# Patient Record
Sex: MALE | Race: WHITE | Employment: OTHER | URBAN - METROPOLITAN AREA
[De-identification: names, ages, dates, MRNs, and addresses within clinical notes are randomized per-mention and may not be internally consistent; named-entity substitution may affect disease eponyms.]

---

## 2017-08-22 ENCOUNTER — HOSPITAL ENCOUNTER (EMERGENCY)
Facility: CLINIC | Age: 62
Discharge: HOME | End: 2017-08-22
Attending: EMERGENCY MEDICINE | Admitting: EMERGENCY MEDICINE
Payer: MEDICARE

## 2017-08-22 ENCOUNTER — APPOINTMENT (OUTPATIENT)
Dept: CT IMAGING | Facility: CLINIC | Age: 62
End: 2017-08-22
Attending: EMERGENCY MEDICINE
Payer: MEDICARE

## 2017-08-22 VITALS
DIASTOLIC BLOOD PRESSURE: 69 MMHG | RESPIRATION RATE: 24 BRPM | SYSTOLIC BLOOD PRESSURE: 125 MMHG | OXYGEN SATURATION: 100 % | HEART RATE: 76 BPM
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
## 2017-08-22 DIAGNOSIS — S01.81XA FACIAL LACERATION, INITIAL ENCOUNTER: ICD-10-CM

## 2017-08-22 DIAGNOSIS — S09.90XA CLOSED HEAD INJURY, INITIAL ENCOUNTER: ICD-10-CM

## 2017-08-22 PROCEDURE — 96372 THER/PROPH/DIAG INJ SC/IM: CPT

## 2017-08-22 PROCEDURE — 25000128 H RX IP 250 OP 636: Performed by: EMERGENCY MEDICINE

## 2017-08-22 PROCEDURE — 70450 CT HEAD/BRAIN W/O DYE: CPT

## 2017-08-22 PROCEDURE — 99285 EMERGENCY DEPT VISIT HI MDM: CPT | Mod: 25

## 2017-08-22 RX ORDER — LIDOCAINE HYDROCHLORIDE 10 MG/ML
INJECTION, SOLUTION EPIDURAL; INFILTRATION; INTRACAUDAL; PERINEURAL
Status: DISCONTINUED
Start: 2017-08-22 | End: 2017-08-22 | Stop reason: HOSPADM

## 2017-08-22 RX ORDER — HALOPERIDOL 5 MG/ML
5 INJECTION INTRAMUSCULAR ONCE
Status: DISCONTINUED | OUTPATIENT
Start: 2017-08-22 | End: 2017-08-22 | Stop reason: CLARIF

## 2017-08-22 RX ORDER — LORAZEPAM 2 MG/ML
2 INJECTION INTRAMUSCULAR ONCE
Status: COMPLETED | OUTPATIENT
Start: 2017-08-22 | End: 2017-08-22

## 2017-08-22 RX ORDER — ZIPRASIDONE MESYLATE 20 MG/ML
10 INJECTION, POWDER, LYOPHILIZED, FOR SOLUTION INTRAMUSCULAR ONCE
Status: COMPLETED | OUTPATIENT
Start: 2017-08-22 | End: 2017-08-22

## 2017-08-22 RX ORDER — LIDOCAINE HYDROCHLORIDE 10 MG/ML
INJECTION, SOLUTION INFILTRATION; PERINEURAL
Status: DISCONTINUED
Start: 2017-08-22 | End: 2017-08-22 | Stop reason: HOSPADM

## 2017-08-22 RX ORDER — HALOPERIDOL 5 MG/ML
5 INJECTION INTRAMUSCULAR ONCE
Status: COMPLETED | OUTPATIENT
Start: 2017-08-22 | End: 2017-08-22

## 2017-08-22 RX ADMIN — LORAZEPAM 2 MG: 2 INJECTION INTRAMUSCULAR; INTRAVENOUS at 16:27

## 2017-08-22 RX ADMIN — ZIPRASIDONE MESYLATE 10 MG: 20 INJECTION, POWDER, LYOPHILIZED, FOR SOLUTION INTRAMUSCULAR at 17:16

## 2017-08-22 RX ADMIN — HALOPERIDOL LACTATE 5 MG: 5 INJECTION, SOLUTION INTRAMUSCULAR at 16:27

## 2017-08-22 NOTE — ED AVS SNAPSHOT
[facility] Emergency Department    [address] [place] [address]    Phone:  [phone]    Fax:  [phone]                                       Gigi Lackey   MRN: [id number]    Department:  [facility] Emergency Department   Date of Visit:  8/22/2017           Patient Information     Date Of Birth          1955        Your diagnoses for this visit were:     Fall, initial encounter     Closed head injury, initial encounter     Facial laceration, initial encounter        You were seen by Aydin Gonzalez MD.      Follow-up Information     Follow up with Janeth Paz MD. Schedule an appointment as soon as possible for a visit in 2 days.    Specialty:  Family Practice    Contact information:    [facility]  [address]   [place] [id number] [phone]          Discharge Instructions       Discharge Instructions  Trauma    You were seen today for an injury due to some kind of trauma (crash, fall, etc.).  Some injuries may not show up until after you leave the Emergency Department.  It is important that you pay attention to these instructions and follow-up with your regular doctor as instructed.    Return to the Emergency Department right away if:    You have abdominal pain or bruises, chest pain, pain in a new area, or pain that is getting worse.    You get short of breath.    You develop a fever over 101 degrees.    You have weakness in your arms or legs.    You faint or you are very lightheaded.    You have any new symptoms, you are feeling weak or unusually ill, or something worries you.     Injuries to the brain are possible with any accident.  Return right away if you have confusion, vomiting more than once, difficulty walking or a headache that is getting worse. Bring a child or a person who can t talk back if they seem to be behaving in an abnormal way.      MORE INFORMATION:    General Injuries:    Aches and pains  are usually worse the day after your accident, but should not be severe, and should start getting better after that. Aches and pains are common in the neck and back.    Injuries from your accident may prevent you from working.  Follow-up with your regular doctor to get a work note and to find out how long you will not be able to work.    Pain medications or your injuries may make it unsafe for you to drive or operate machinery.    Use ice to injured areas for the first one or two days. Apply a bag of ice wrapped in a cloth for about 15 minutes at a time. You can do this as often as once an hour. Do not sleep with an ice pack, since it can burn you.     You can use non-prescription pain medicine, like Tylenol  (acetaminophen), Advil  (ibuprofen), Motrin  (ibuprofen), Nuprin  (ibuprofen) if your emergency doctor or your own doctor told you this is okay. Tylenol  (acetaminophen) is in many prescription medicines and non-prescription medicines--check all of your medicines to be sure you aren t taking more than 3000 mg per day.    Limit your activity for at least one or two days. Avoid doing things that hurt.    You need to see your doctor if any injured area is not back to normal in 1 week.    Car Accident:    If you have been on a backboard or had a neck collar on, this may make you stiff and sore.  This should get better in 1-2 days.  Return to the Emergency Department if the pain or discomfort is severe or gets worse.    Be careful of shards of glass on your body or in your belongings.    Fractures, Sprains, and Strains:    Return to the Emergency Department right away if your injured area gets more painful, if the splint or dressing seems to be too tight, if it gets numb or tingly past the injury, or if the area past the injury gets pale, blue, or cold.    Use your crutches if you were given them today. Don t put weight on the injured area until the pain is gone.    Keep the injured area above the level of your  heart while laying or sitting down.  This well help lessen the swelling (puffiness) and the pain.    You may use an elastic bandage (Ace  Wrap) if it makes you more comfortable. Wrap it just tight enough to provide mild compression, and loosen it if you get swelling past the bandage.    Note about X-rays: If you had x-rays done today, they were read by your emergency physician. They will also be read later by a radiologist. We will contact you if the radiologist thinks they show something different than the emergency physician did. Remember that there are some fractures (breaks in the bone) that can t be seen right away. Even if your x-rays today were normal, you must see your doctor in clinic to re-check.     Splints:    A splint put on in the Emergency Department is temporary. Your regular doctor or orthopedic doctor will remove it, and replace it with a cast or boot if needed.    Keep the splint dry. Cover it with a plastic bag when you wash. Even with a plastic bag, you still can t get in water or let water get right on it. If it does get wet, you should come back or see your doctor to have it replaced.    Do not put objects inside the splint to scratch.    If there is an elastic bandage (Ace  Wrap) holding the splint on this may be loosened a little to relieve pressure or pain.  If pain continues return to the Emergency Department right away.    Return if the splint starts cutting into your skin.    Do not remove your splint by yourself unless told to by your doctor. You can t take it off and put it back on again.     Wounds:    Infections can follow many injuries. Watch for fevers, redness spreading from the wound, pus or stitches that open up. Return here or see your doctor if these happen.    There can always be glass, wood, dirt or other things in any wound.  They won t always show up even on x-rays.  If a wound doesn t heal, this may be why, and it is important to follow-up with your regular doctor. Small  pieces of glass or other materials may work their way out on their own.    Cuts or scrapes may start to bleed after leaving the Emergency Department.  If this happens, hold pressure on the bleeding area with a clean cloth or put pressure over the bandage.  If the bleeding doesn t stop after you use constant pressure for   hour, you should return to the Emergency Department for further treatment.    Any bandage or dressing put on here should be removed in 12-24 hours, or as your doctor instructs. Remove the dressing sooner if it seems too tight or painful, or if it is getting numb, tingly, or pale past the dressing.    After you take off the dressing, wash the cut or scrape with soap and water once or twice a day.    Apply ointment like Bacitracin  (polypeptide antibiotic) to scrapes or cuts, and keep them covered with a Band-Aid  or gauze if possible, until they heal up or until your stitches are taken out.    Dermabond  or Steri-Strips  should be left alone and will come off by themselves.  Dissolving stitches should go away or fall out within about a week.    Regular stitches need to be taken out by your doctor in clinic.  Call today and schedule an appointment.  Leave your stitches in for as long as you were told today.    Most injuries are preventable!  As your local emergency physicians, we encourage you to:    Wear your seat belt.    Do not talk on your cell phone while driving.    Do not read or send text messages while driving.    Wear a bike or motorcycle helmet.    Wear a helmet while skiing and snowboarding.    Wear personal flotation devices at all times while on the water.    Always have your child in a car seat.    Do not allow children less than 12 years old to ride in the front seat.    Go to the CDC website to find more information on preventing injures:  http://www.cdc.gov/injury/index.html    If you were given a prescription for medicine here today, be sure to read all of the information  (including the package insert) that comes with your prescription.  This will include important information about the medicine, its side effects, and any warnings that you need to know about.  The pharmacist who fills the prescription can provide more information and answer questions you may have about the medicine.  If you have questions or concerns that the pharmacist cannot address, please call or return to the Emergency Department.     Opioid Medication Information    Pain medications are among the most commonly prescribed medicines, so we are including this information for all our patients. If you did not receive pain medication or get a prescription for pain medicine, you can ignore it.     You may have been given a prescription for an opioid (narcotic) pain medicine and/or have received a pain medicine while here in the Emergency Department. These medicines can make you drowsy or impaired. You must not drive, operate dangerous equipment, or engage in any other dangerous activities while taking these medications. If you drive while taking these medications, you could be arrested for DUI, or driving under the influence. Do not drink any alcohol while you are taking these medications.     Opioid pain medications can cause addiction. If you have a history of chemical dependency of any type, you are at a higher risk of becoming addicted to pain medications.  Only take these prescribed medications to treat your pain when all other options have been tried. Take it for as short a time and as few doses as possible. Store your pain pills in a secure place, as they are frequently stolen and provide a dangerous opportunity for children or visitors in your house to start abusing these powerful medications. We will not replace any lost or stolen medicine.  As soon as your pain is better, you should flush all your remaining medication.     Many prescription pain medications contain Tylenol  (acetaminophen), including  Vicodin , Tylenol #3 , Norco , Lortab , and Percocet .  You should not take any extra pills of Tylenol  if you are using these prescription medications or you can get very sick.  Do not ever take more than 3000 mg of acetaminophen in any 24 hour period.    All opioids tend to cause constipation. Drink plenty of water and eat foods that have a lot of fiber, such as fruits, vegetables, prune juice, apple juice and high fiber cereal.  Take a laxative if you don t move your bowels at least every other day. Miralax , Milk of Magnesia, Colace , or Senna  can be used to keep you regular.      Remember that you can always come back to the Emergency Department if you are not able to see your regular doctor in the amount of time listed above, if you get any new symptoms, or if there is anything that worries you.      Sutures are absorbable and do not need to be taken out.    24 Hour Appointment Hotline       To make an appointment at any [facility], call [phone] ([phone]). If you don't have a family doctor or clinic, we will help you find one. [facility] clinics are conveniently located to serve the needs of you and your family.             Review of your medicines      Our records show that you are taking the medicines listed below. If these are incorrect, please call your family doctor or clinic.        Dose / Directions Last dose taken    acetaminophen 325 MG tablet   Commonly known as:  TYLENOL   Dose:  650 mg   Quantity:  40 tablet        Take 2 tablets (650 mg) by mouth every 4 hours as needed   Refills:  0        calcium carbonate 500 MG chewable tablet   Commonly known as:  TUMS        1 tablet in am and 2 tablets at 1600   Refills:  0        carbamide peroxide 6.5 % otic solution   Commonly known as:  DEBROX   Dose:  5 drop        5 drops 2 times daily   Refills:  0        DEPAKOTE PO        250mg in am and 500mg hs   Refills:  0        DIAZEPAM PO   Dose:  7.5 mg        Take 7.5 mg by mouth 1.5  tablets PO 3.5hrs before medical appointments   Refills:  0        DOCUSATE SODIUM PO   Dose:  200 mg        Take 200 mg by mouth daily   Refills:  0        FOLIC ACID PO   Dose:  1 mg        Take 1 mg by mouth daily   Refills:  0        ibuprofen 200 MG tablet   Commonly known as:  ADVIL/MOTRIN   Dose:  600 mg   Quantity:  40 tablet        Take 3 tablets (600 mg) by mouth every 6 hours as needed   Refills:  0        ketoconazole 2 % Foam        Externally apply topically twice a week   Refills:  0        LEXAPRO PO   Dose:  15 mg        Take 15 mg by mouth daily   Refills:  0        loratadine 10 MG tablet   Commonly known as:  CLARITIN   Dose:  10 mg        Take 10 mg by mouth daily as needed for allergies   Refills:  0        multivitamin, therapeutic Tabs tablet   Dose:  1 tablet        Take 1 tablet by mouth daily   Refills:  0        OMEPRAZOLE PO   Dose:  20 mg        Take 20 mg by mouth daily   Refills:  0        psyllium 0.52 G capsule   Dose:  1 capsule        Take 1 capsule by mouth daily   Refills:  0        sennosides 8.6 MG tablet   Commonly known as:  SENOKOT   Dose:  1 tablet        Take 1 tablet by mouth daily   Refills:  0        ZYPREXA PO   Dose:  5 mg        Take 5 mg by mouth At Bedtime   Refills:  0                Procedures and tests performed during your visit     CT Head w/o Contrast      Orders Needing Specimen Collection     None      Pending Results     No orders found from 8/20/2017 to 8/23/2017.            Pending Culture Results     No orders found from 8/20/2017 to 8/23/2017.            Pending Results Instructions     If you had any lab results that were not finalized at the time of your Discharge, you can call the ED Lab Result RN at [phone]. You will be contacted by this team for any positive Lab results or changes in treatment. The nurses are available 7 days a week from 10A to 6:30P.  You can leave a message 24 hours per day and they will return your call.        Test  Results From Your Hospital Stay        8/22/2017  6:11 PM      Narrative     CT SCAN OF THE HEAD WITHOUT CONTRAST   8/22/2017 6:03 PM     HISTORY: Trauma    TECHNIQUE:  Axial images of the head and coronal reformations without  IV contrast material.  Radiation dose for this scan was reduced using  automated exposure control, adjustment of the mA and/or kV according  to patient size, or iterative reconstruction technique.    COMPARISON: 6/5/2016    FINDINGS: There is a supraorbital scalp hematoma. There is no evidence  for any underlying intracranial hemorrhage or skull fracture. Mild  cerebral atrophy is present. There is no evidence for acute infarct or  mass effect.        Impression     IMPRESSION:  1. Right supraorbital scalp hematoma. No evidence for intracranial  hemorrhage or fracture.  2. Mild cerebral atrophy.    PAULO JOHN MD                Clinical Quality Measure: Blood Pressure Screening     Your blood pressure was checked while you were in the emergency department today. The last reading we obtained was  BP: 125/69 . Please read the guidelines below about what these numbers mean and what you should do about them.  If your systolic blood pressure (the top number) is less than 120 and your diastolic blood pressure (the bottom number) is less than 80, then your blood pressure is normal. There is nothing more that you need to do about it.  If your systolic blood pressure (the top number) is 120-139 or your diastolic blood pressure (the bottom number) is 80-89, your blood pressure may be higher than it should be. You should have your blood pressure rechecked within a year by a primary care provider.  If your systolic blood pressure (the top number) is 140 or greater or your diastolic blood pressure (the bottom number) is 90 or greater, you may have high blood pressure. High blood pressure is treatable, but if left untreated over time it can put you at risk for heart attack, stroke, or kidney failure. You  should have your blood pressure rechecked by a primary care provider within the next 4 weeks.  If your provider in the emergency department today gave you specific instructions to follow-up with your doctor or provider even sooner than that, you should follow that instruction and not wait for up to 4 weeks for your follow-up visit.        Thank you for choosing [facility]       Thank you for choosing [facility] for your care. Our goal is always to provide you with excellent care. Hearing back from our patients is one way we can continue to improve our services. Please take a few minutes to complete the written survey that you may receive in the mail after you visit with us. Thank you!        [employer] Information     [facility] gives you secure access to your electronic health record. If you see a primary care provider, you can also send messages to your care team and make appointments. If you have questions, please call your primary care clinic.  If you do not have a primary care provider, please call [phone] and they will assist you.        Care EveryWhere ID     This is your Care EveryWhere ID. This could be used by other organizations to access your [place] medical records  [id number]        Equal Access to Services     [facility] : Hadmelissa Rivas, leroy ray, qaalexis mcpherson. So [facility] [phone].    ATENCIÓN: Si habla español, tiene a zhang disposición servicios gratuitos de asistencia lingüística. Llame al [phone].    We comply with applicable federal civil rights laws and [place] laws. We do not discriminate on the basis of race, color, national origin, age, disability sex, sexual orientation or gender identity.            After Visit Summary       This is your record. Keep this with you and show to your community pharmacist(s) and doctor(s) at your next visit.

## 2017-08-22 NOTE — ED NOTES
Pt got up from wheelchair and fell forward, no LOC per staff.  Pt nonverbal at baseline, multiple facial abrasions and swelling, left hand injury.  PMH: See hx and paper list  Meds; See paper list

## 2017-08-22 NOTE — PROGRESS NOTES
Sats 84% on RA upon arrival back to room. O2 2 LPM in place. Sats returned to 100%. Pt sleepy. Rouses when touched.

## 2017-08-22 NOTE — ED AVS SNAPSHOT
[facility] Emergency Department    [address]    [facility] [address]    Phone:  [phone]    Fax:  [phone]                                       Gigi Lackey   MRN: [id number]    Department:  [facility] Emergency Department   Date of Visit:  8/22/2017           After Visit Summary Signature Page     I have received my discharge instructions, and my questions have been answered. I have discussed any challenges I see with this plan with the nurse or doctor.    ..........................................................................................................................................  Patient/Patient Representative Signature      ..........................................................................................................................................  Patient Representative Print Name and Relationship to Patient    ..................................................               ................................................  Date                                            Time    ..........................................................................................................................................  Reviewed by Signature/Title    ...................................................              ..............................................  Date                                                            Time

## 2017-08-22 NOTE — ED PROVIDER NOTES
History     Chief Complaint:  Fall      HPI The history is obtained through the patient's caregivers and is limited due to the patient's non-verbal status.     Gigi Lackey is a 61 year old male with a pervasive developmental disorder and profound intellectual disability who is wheelchair bound at baseline who presents accompanied by his caregivers for evaluation following a fall. Today around 1450, the patient had an unwitnessed fall forward from his wheelchair onto the ground in which he struck his forehead and sustained multiple abrasions to his face and a laceration to his right eyebrow. The patient's caregivers found him immediately after the fall and he was conscious at that time. They then brought him into the ED for evaluation of this head injury. His caregivers note that he did appear to be falling asleep in the car on the way to the ED, which is abnormal for him. Currently in the ED, the patient is repositioning himself in his chair and making noises, which they report that he does at baseline, and they feel that he is acting normally.     Allergies:  Actifed   Alkylamines   Doxycycline   Sympathomimetics      Medications:    acetaminophen (TYLENOL) 325 MG tablet  ibuprofen (ADVIL,MOTRIN) 200 MG tablet  loratadine (CLARITIN) 10 MG tablet  ketoconazole 2 % FOAM  carbamide peroxide (DEBROX) 6.5 % otic solution  calcium carbonate (TUMS) 500 MG chewable tablet  Escitalopram Oxalate (LEXAPRO PO)  Divalproex Sodium (DEPAKOTE PO)  DOCUSATE SODIUM PO  FOLIC ACID PO  multivitamin, therapeutic (THERA-VIT) TABS  OMEPRAZOLE PO  sennosides (SENOKOT) 8.6 MG tablet  psyllium 0.52 G capsule  OLANZapine (ZYPREXA PO)  DIAZEPAM PO    Past Medical History:    Anemia  Epilepsy  GERD   Osteoporosis   Pervasive developmental disorder  Profound intellectual disability     Past Surgical History:    Ankle surgery     Family History:    History reviewed. No pertinent family history.     Social History:  Tobacco use:    Never  smoker  Alcohol use:    Negative  Marital status:    Single   Accompanied to ED by:  Caregivers      Review of Systems   Unable to perform ROS: Patient nonverbal     Physical Exam   First Vitals:  BP: (!) 87/72 (Pt not cooperative with BP at baseline per staff pt moving a lot during reading.)  Pulse: 76  Temp:  (Pt agitated at this time will try in the room in the back)  Resp: 24  SpO2: 100 %      Repeat Vitals:   BP: 125/69  SpO2: 97 % (08/22 1551-08/22 1924)        Physical Exam  General: Sitting in a wheelchair moaning and resists attempts to examine him.     HENT: Mucous membranes moist. Abrasions to his right ear, right zygomatic arch, and right brow with laceration and swelling.     Cardiovascular: Regular rate and rhythm. Good pulses in all four extremities. Normal capillary refill and skin turgor.     Respiratory: Lungs are clear. No nasal flaring. No retractions. No wheezing, no crackles.    Gastrointestinal: Abdomen soft. No guarding, no rebound. No palpable hernias.     Musculoskeletal: No gross deformity. There is no grimace with palpation of any bones.     Skin: No rashes or petechiae.     Neurologic: Holds wrists in flexion. Pulls away from the exam but does not communicate.      Lymphatic: No cervical adenopathy. No lower extremity swelling.    Emergency Department Course     Imaging:  Radiographic findings were communicated with the caregivers who voiced understanding of the findings.    CT Head w/o Contrast:  IMPRESSION:  1. Right supraorbital scalp hematoma. No evidence for intracranial hemorrhage or fracture.  2. Mild cerebral atrophy.  Per radiology.     Procedures:    Narrative: Procedure: Laceration Repair        LACERATION:  A simple clean 3 cm laceration.      LOCATION:  Right eyebrow       FUNCTION:  Distally sensation and circulation are intact.      ANESTHESIA:  Local using 1.0% Lidocaine and LET - Topical      PREPARATION:  Irrigation with Normal Saline and Shur Clens      DEBRIDEMENT:   no debridement      CLOSURE:  Wound was closed with One Layer.  Skin closed with 5 x 5.0 Vicryl Rapide using interrupted sutures.     Interventions:  1627 Ativan 2 mg IM  1627 Haldol 5 mg IM  1716 Geodon 10 mg IM     Emergency Department Course:  Nursing notes and vitals reviewed.  1559: I performed an exam of the patient as documented above.     1825:  A laceration repair was performed as outlined in the procedure note above.  The patient tolerated well and there were no complications.     2003: I reassessed the patient. He is moving spontaneously.     Findings and plan explained to the caregiver. Patient discharged home with instructions regarding supportive care, medications, and reasons to return. The importance of close follow-up was reviewed.     Impression & Plan      Medical Decision Making:  Gigi Lackey is a 61 year old male who unfortunately is not able to talk or communicate and the staff was with him and is very helpful and states that he is at his baseline. The only concerning feature is that after he fell out of his chair he did not black out but was somewhat more sleepy in the car. I therefore did have to sedate the patient to perform a CT scan of his head, which was normal except for the hematoma and the findings stated above. The patient also had facial lacerations that were sutured. He showed no signs to suggest bony tenderness anywhere else, and he moved his extremities without significant problems and he was discharged in good condition back with his caregivers to his facility where they will continue observing him and have him follow up with his primary care doctor. He was discharged with head injury instruction sheet provided.     Diagnosis:    ICD-10-CM   1. Fall, initial encounter W19.XXXA   2. Closed head injury, initial encounter S09.90XA   3. Facial laceration, initial encounter S01.81XA       Disposition:  Discharged to home.       Jean-Paul SOLOMON, am serving as a scribe at 3:59 PM on  8/22/2017 to document services personally performed by Dr. Gonzalez, based on my observations and the provider's statements to me.    [facility] EMERGENCY DEPARTMENT       Aydin Gonzalez MD  08/22/17 8295

## 2017-08-23 NOTE — ED NOTES
Staff verbalizes the understanding of dc instructions, signs to return to ER, and follow up directions.

## 2017-08-23 NOTE — DISCHARGE INSTRUCTIONS
Discharge Instructions  Trauma    You were seen today for an injury due to some kind of trauma (crash, fall, etc.).  Some injuries may not show up until after you leave the Emergency Department.  It is important that you pay attention to these instructions and follow-up with your regular doctor as instructed.    Return to the Emergency Department right away if:    You have abdominal pain or bruises, chest pain, pain in a new area, or pain that is getting worse.    You get short of breath.    You develop a fever over 101 degrees.    You have weakness in your arms or legs.    You faint or you are very lightheaded.    You have any new symptoms, you are feeling weak or unusually ill, or something worries you.     Injuries to the brain are possible with any accident.  Return right away if you have confusion, vomiting more than once, difficulty walking or a headache that is getting worse. Bring a child or a person who can t talk back if they seem to be behaving in an abnormal way.      MORE INFORMATION:    General Injuries:    Aches and pains are usually worse the day after your accident, but should not be severe, and should start getting better after that. Aches and pains are common in the neck and back.    Injuries from your accident may prevent you from working.  Follow-up with your regular doctor to get a work note and to find out how long you will not be able to work.    Pain medications or your injuries may make it unsafe for you to drive or operate machinery.    Use ice to injured areas for the first one or two days. Apply a bag of ice wrapped in a cloth for about 15 minutes at a time. You can do this as often as once an hour. Do not sleep with an ice pack, since it can burn you.     You can use non-prescription pain medicine, like Tylenol  (acetaminophen), Advil  (ibuprofen), Motrin  (ibuprofen), Nuprin  (ibuprofen) if your emergency doctor or your own doctor told you this is okay. Tylenol  (acetaminophen) is in  many prescription medicines and non-prescription medicines--check all of your medicines to be sure you aren t taking more than 3000 mg per day.    Limit your activity for at least one or two days. Avoid doing things that hurt.    You need to see your doctor if any injured area is not back to normal in 1 week.    Car Accident:    If you have been on a backboard or had a neck collar on, this may make you stiff and sore.  This should get better in 1-2 days.  Return to the Emergency Department if the pain or discomfort is severe or gets worse.    Be careful of shards of glass on your body or in your belongings.    Fractures, Sprains, and Strains:    Return to the Emergency Department right away if your injured area gets more painful, if the splint or dressing seems to be too tight, if it gets numb or tingly past the injury, or if the area past the injury gets pale, blue, or cold.    Use your crutches if you were given them today. Don t put weight on the injured area until the pain is gone.    Keep the injured area above the level of your heart while laying or sitting down.  This well help lessen the swelling (puffiness) and the pain.    You may use an elastic bandage (Ace  Wrap) if it makes you more comfortable. Wrap it just tight enough to provide mild compression, and loosen it if you get swelling past the bandage.    Note about X-rays: If you had x-rays done today, they were read by your emergency physician. They will also be read later by a radiologist. We will contact you if the radiologist thinks they show something different than the emergency physician did. Remember that there are some fractures (breaks in the bone) that can t be seen right away. Even if your x-rays today were normal, you must see your doctor in clinic to re-check.     Splints:    A splint put on in the Emergency Department is temporary. Your regular doctor or orthopedic doctor will remove it, and replace it with a cast or boot if  needed.    Keep the splint dry. Cover it with a plastic bag when you wash. Even with a plastic bag, you still can t get in water or let water get right on it. If it does get wet, you should come back or see your doctor to have it replaced.    Do not put objects inside the splint to scratch.    If there is an elastic bandage (Ace  Wrap) holding the splint on this may be loosened a little to relieve pressure or pain.  If pain continues return to the Emergency Department right away.    Return if the splint starts cutting into your skin.    Do not remove your splint by yourself unless told to by your doctor. You can t take it off and put it back on again.     Wounds:    Infections can follow many injuries. Watch for fevers, redness spreading from the wound, pus or stitches that open up. Return here or see your doctor if these happen.    There can always be glass, wood, dirt or other things in any wound.  They won t always show up even on x-rays.  If a wound doesn t heal, this may be why, and it is important to follow-up with your regular doctor. Small pieces of glass or other materials may work their way out on their own.    Cuts or scrapes may start to bleed after leaving the Emergency Department.  If this happens, hold pressure on the bleeding area with a clean cloth or put pressure over the bandage.  If the bleeding doesn t stop after you use constant pressure for   hour, you should return to the Emergency Department for further treatment.    Any bandage or dressing put on here should be removed in 12-24 hours, or as your doctor instructs. Remove the dressing sooner if it seems too tight or painful, or if it is getting numb, tingly, or pale past the dressing.    After you take off the dressing, wash the cut or scrape with soap and water once or twice a day.    Apply ointment like Bacitracin  (polypeptide antibiotic) to scrapes or cuts, and keep them covered with a Band-Aid  or gauze if possible, until they heal up or  until your stitches are taken out.    Dermabond  or Steri-Strips  should be left alone and will come off by themselves.  Dissolving stitches should go away or fall out within about a week.    Regular stitches need to be taken out by your doctor in clinic.  Call today and schedule an appointment.  Leave your stitches in for as long as you were told today.    Most injuries are preventable!  As your local emergency physicians, we encourage you to:    Wear your seat belt.    Do not talk on your cell phone while driving.    Do not read or send text messages while driving.    Wear a bike or motorcycle helmet.    Wear a helmet while skiing and snowboarding.    Wear personal flotation devices at all times while on the water.    Always have your child in a car seat.    Do not allow children less than 12 years old to ride in the front seat.    Go to the CDC website to find more information on preventing injures:  http://www.cdc.gov/injury/index.html    If you were given a prescription for medicine here today, be sure to read all of the information (including the package insert) that comes with your prescription.  This will include important information about the medicine, its side effects, and any warnings that you need to know about.  The pharmacist who fills the prescription can provide more information and answer questions you may have about the medicine.  If you have questions or concerns that the pharmacist cannot address, please call or return to the Emergency Department.     Opioid Medication Information    Pain medications are among the most commonly prescribed medicines, so we are including this information for all our patients. If you did not receive pain medication or get a prescription for pain medicine, you can ignore it.     You may have been given a prescription for an opioid (narcotic) pain medicine and/or have received a pain medicine while here in the Emergency Department. These medicines can make you drowsy  or impaired. You must not drive, operate dangerous equipment, or engage in any other dangerous activities while taking these medications. If you drive while taking these medications, you could be arrested for DUI, or driving under the influence. Do not drink any alcohol while you are taking these medications.     Opioid pain medications can cause addiction. If you have a history of chemical dependency of any type, you are at a higher risk of becoming addicted to pain medications.  Only take these prescribed medications to treat your pain when all other options have been tried. Take it for as short a time and as few doses as possible. Store your pain pills in a secure place, as they are frequently stolen and provide a dangerous opportunity for children or visitors in your house to start abusing these powerful medications. We will not replace any lost or stolen medicine.  As soon as your pain is better, you should flush all your remaining medication.     Many prescription pain medications contain Tylenol  (acetaminophen), including Vicodin , Tylenol #3 , Norco , Lortab , and Percocet .  You should not take any extra pills of Tylenol  if you are using these prescription medications or you can get very sick.  Do not ever take more than 3000 mg of acetaminophen in any 24 hour period.    All opioids tend to cause constipation. Drink plenty of water and eat foods that have a lot of fiber, such as fruits, vegetables, prune juice, apple juice and high fiber cereal.  Take a laxative if you don t move your bowels at least every other day. Miralax , Milk of Magnesia, Colace , or Senna  can be used to keep you regular.      Remember that you can always come back to the Emergency Department if you are not able to see your regular doctor in the amount of time listed above, if you get any new symptoms, or if there is anything that worries you.      Sutures are absorbable and do not need to be taken out.

## 2018-05-04 ENCOUNTER — APPOINTMENT (OUTPATIENT)
Dept: GENERAL RADIOLOGY | Facility: CLINIC | Age: 63
End: 2018-05-04
Attending: EMERGENCY MEDICINE
Payer: MEDICARE

## 2018-05-04 ENCOUNTER — HOSPITAL ENCOUNTER (EMERGENCY)
Facility: CLINIC | Age: 63
Discharge: HOME | End: 2018-05-04
Attending: EMERGENCY MEDICINE | Admitting: EMERGENCY MEDICINE
Payer: MEDICARE

## 2018-05-04 VITALS — TEMPERATURE: 98.2 F | RESPIRATION RATE: 20 BRPM | OXYGEN SATURATION: 98 %

## 2018-05-04 DIAGNOSIS — S63.614A SPRAIN OF RIGHT RING FINGER, UNSPECIFIED SITE OF FINGER, INITIAL ENCOUNTER: ICD-10-CM

## 2018-05-04 DIAGNOSIS — S60.512A ABRASION OF LEFT HAND, INITIAL ENCOUNTER: ICD-10-CM

## 2018-05-04 DIAGNOSIS — S62.002A CLOSED DISPLACED FRACTURE OF SCAPHOID OF LEFT WRIST, UNSPECIFIED PORTION OF SCAPHOID, INITIAL ENCOUNTER: ICD-10-CM

## 2018-05-04 PROCEDURE — 73130 X-RAY EXAM OF HAND: CPT | Mod: 50

## 2018-05-04 PROCEDURE — 99284 EMERGENCY DEPT VISIT MOD MDM: CPT | Mod: 25

## 2018-05-04 PROCEDURE — 73610 X-RAY EXAM OF ANKLE: CPT | Mod: RT

## 2018-05-04 PROCEDURE — 25622 CLTX CARPL SCPHD FX W/O MNPJ: CPT | Mod: LT

## 2018-05-04 ASSESSMENT — ENCOUNTER SYMPTOMS
BACK PAIN: 0
ARTHRALGIAS: 1
NECK PAIN: 0
HEADACHES: 0

## 2018-05-04 NOTE — ED PROVIDER NOTES
History     Chief Complaint:  Fall    HPI   Gigi Lackey is a 62 year old male with a history of profound intellectual disability who presents with following a fall. The patient presents today with his group home staff member this morning after experiencing a fall yesterday. This fall occurred from two steps off of the ground onto a cement surface. The patient fell onto his hands and body first and secondarily hit his head. The patient seems to have some pain in his bilateral hands and right ankle. He does have a history of spontaneous right ankle swelling. The patient did not lose consciousness, has not vomited, and does appear to be mentating at his normal baseline.    Allergies:  Actifed  Antihistamines, chlorpheniramine type  Doxycycline  Sympathomimetics     Medications:    Diazepam  Depakote  Lexapro  Claritin  Zyprexa  Omeprazole    Past Medical History:    Anemia  Epilepsy  GERD  Osteoporosis  Pervasive developmental disorder  Profound intellectual disability     Past Surgical History:    Orthopedic ankle surgery    Family History:    The patient denies any relevant family medical history.     Social History:  The patient was accompanied to the ED by a caregiver.  Smoking Status: No  Alcohol Use: No   Marital Status:  Single [1]    Review of Systems   Musculoskeletal: Positive for arthralgias. Negative for back pain and neck pain.   Neurological: Negative for syncope and headaches.   All other systems reviewed and are negative.    Vital signs and nursing notes reviewed.     Physical Exam   Vitals:  Patient Vitals for the past 24 hrs:   BP Temp Temp src Heart Rate Resp SpO2   05/04/18 1146 - 98.2  F (36.8  C) Temporal 87 20 98 %        Physical Exam   Cons  HENT: Oropharynx is clear and moist. Subtle abrasion and minimal swelling at left lateral eye brow area  No hematomas seen  Eyes: Conjunctivae are normal bilaterally. Pupil equal on right, left eye chronic changes  Neck: normal range of motion, without  evidence of pain  Cardiovascular: Normal rate, regular rhythm, normal heart sounds.   Pulmonary/Chest: Effort normal and breath sounds normal. No respiratory distress.   Abdominal: Soft. Bowel sounds are normal. No evidence of tenderness to palpation. No rebound or guarding.   Musculoskeletal: No joint swelling or edema. Abrasions to the dorsum of the left hand predominantly over the second metacarpal and distal third metacarpal. Swelling in the right lateral malleolar area no redness. No bruising to the ribs or shoulders.  Ecchymosis  involving the right fourth finger with slight swelling, no obvious deformity  Neurological: Alert, moves all extremities equally. Acting appropriately per the caregiver.  Skin: Skin is warm and dry. No rash noted.   Psych: normal affect     Emergency Department Course     Imaging:  Radiology findings were communicated with the patient and caregiver who voiced understanding of the findings.  XR hand bilateral G/E 3 views:  IMPRESSION: Acute left scaphoid fracture.  Reading per radiology.     Ankle XR, G/E 3 views, right:  IMPRESSION: No acute osseous abnormality.  Reading per radiology.     Emergency Department Course:  Nursing notes and vitals reviewed.  I performed an exam of the patient as documented above.     1343 I rechecked with the patient    Findings and plan explained to the Patient and caregiver. Patient discharged home with instructions regarding supportive care, medications, and reasons to return. The importance of close follow-up was reviewed.      I personally reviewed the laboratory results with the patient and caregiver and answered all related questions prior to discharge.    Impression & Plan      Medical Decision Making:  Gigi Lackey is a 62 year old male who presents to the emergency department today after sustaining a fall yesterday at his care facility. On examination, he did not seem to have any significant head, neck, back, or long bone injuries. There was  predominant discomfort and mild swelling of the left hand and wrist area as well as in the right fourth finger with questionable swelling of the right lateral ankle. X rays of these areas were obtained which were essentially negative, accept for the appearance of a probable left scaphoid fracture of the left wrist. There is no obvious comparison films so it is unclear if this is acute or not, but based on his recent injury I have to assume that this is the case. We placed him in a thumb spica and his caregiver was advised to leave this in place. He is given referral to Dr. Dia from hand surgery for follow up early next week. There are no other apparent concerning injuries. I felt he was safe for discharge home.     Diagnosis:    ICD-10-CM    1. Closed displaced fracture of scaphoid of left wrist, unspecified portion of scaphoid, initial encounter S62.002A    2. Sprain of right ring finger, unspecified site of finger, initial encounter S63.614A    3. Abrasion of left hand, initial encounter S60.512A         Disposition:   Discharged    CMS Diagnoses: None     Scribe Disclosure:  I, Jorge Martinez, am serving as a scribe at 12:23 PM on 5/4/2018 to document services personally performed by Cameron Shepherd MD, based on my observations and the provider's statements to me.   [facility] EMERGENCY DEPARTMENT       Cameron Shepherd MD  05/04/18 4840

## 2018-05-04 NOTE — ED AVS SNAPSHOT
[facility] Emergency Department    [address]    [facility] [address]    Phone:  [phone]    Fax:  [phone]                                       Gigi Lackey   MRN: [id number]    Department:  [facility]   Date of Visit:  5/4/2018           Patient Information     Date Of Birth          1955        Your diagnoses for this visit were:     Closed displaced fracture of scaphoid of left wrist, unspecified portion of scaphoid, initial encounter     Sprain of right ring finger, unspecified site of finger, initial encounter     Abrasion of left hand, initial encounter        You were seen by Cameron Shepherd MD.      Follow-up Information     Call Bob Kapoor MD.    Specialty:  Orthopedics    Why:  follow up early next week    Contact information:    [facility]  [address]  [facility] [address]  [phone]          Discharge Instructions         Navicular Wrist Fracture, Confirmed  You have a break (fracture) in one of the small bones of your wrist. This bone heals slowly. You may need to be in a cast for up to 3 months. Some navicular fractures don t heal the way they should. If so, you may need surgery at a later time.    Home care  Follow these guidelines when caring for yourself at home:    Keep your hand elevated to reduce pain and swelling. When sitting or lying down keep your arm above the level of your heart. You can do this by placing your arm on a pillow that rests on your chest or on a pillow at your side. This is most important during the first 2 days (48 hours) after the injury.    Put an ice pack on the injured area. Do this for 20 minutes every 1 to 2 hours the first day for pain relief. You can make an ice pack by wrapping a plastic bag of ice cubes in a thin towel. As the ice melts, be careful that the cast or splint doesn t get wet. Continue using the ice pack 3 to 4 times a day for the next  2 days. Then use the ice pack as needed to ease pain and swelling.    Keep the cast or splint completely dry at all times. Bathe with your cast or splint out of the water. Protect it with a large plastic bag, rubber-banded at the top end. If a fiberglass cast or splint gets wet, you can dry it with a hair dryer on the cool setting.    You may use acetaminophen or ibuprofen to control pain, unless another pain medicine was prescribed. If you have chronic liver or kidney disease, talk with your healthcare provider before using these medicines. Also talk with your provider if you ve had a stomach ulcer or gastrointestinal bleeding.    If you smoke, try to quit. Tobacco use can keep this fracture from healing the way it should. Smoking raises the risk that you will need surgery for this fracture.  Follow-up care  Follow up with your healthcare provider, or as advised. This is to make sure the bone is healing the way it should. If a splint was put on, it may be changed to a cast during your follow-up visit. When the cast is removed, you will need to do special hand and wrist exercises. These will help you get back your wrist strength and range of motion. Some people have permanent stiffness in the wrist after this type of injury.  If X-rays were taken, a radiologist may look at them. You will be told of any new findings that may affect your care.  When to seek medical advice  Call your healthcare provider right away if any of these occur:    The cast or splint cracks    The plaster cast or splint becomes wet or soft    The fiberglass cast or splint stays wet for more than 24 hours    Tightness or pain under the cast or splint gets worse    Fingers become swollen, cold, blue, numb, or tingly    Fingers are hard to move    Bad odor from the cast or splint or wound fluid stains the cast or splint    The skin around the cast or splint becomes red, swollen, or irritated    Fever of 101 F (38.3 C) or higher, or as directed by  your healthcare provider  Date Last Reviewed: 5/1/2017 2000-2017 [employer]. [address] [place] [address], [place] [address]. All rights reserved. This information is not intended as a substitute for professional medical care. Always follow your healthcare professional's instructions.          24 Hour Appointment Hotline       To make an appointment at any [facility], call [phone] ([phone]). If you don't have a family doctor or clinic, we will help you find one. [facility] are conveniently located to serve the needs of you and your family.             Review of your medicines      Our records show that you are taking the medicines listed below. If these are incorrect, please call your family doctor or clinic.        Dose / Directions Last dose taken    acetaminophen 325 MG tablet   Commonly known as:  TYLENOL   Dose:  650 mg   Quantity:  40 tablet        Take 2 tablets (650 mg) by mouth every 4 hours as needed   Refills:  0        calcium carbonate 500 MG chewable tablet   Commonly known as:  TUMS        1 tablet in am and 2 tablets at 1600   Refills:  0        carbamide peroxide 6.5 % otic solution   Commonly known as:  DEBROX   Dose:  5 drop        5 drops 2 times daily   Refills:  0        DEPAKOTE PO        250mg in am and 500mg hs   Refills:  0        DIAZEPAM PO   Dose:  7.5 mg        Take 7.5 mg by mouth 1.5 tablets PO 3.5hrs before medical appointments   Refills:  0        DOCUSATE SODIUM PO   Dose:  200 mg        Take 200 mg by mouth daily   Refills:  0        FOLIC ACID PO   Dose:  1 mg        Take 1 mg by mouth daily   Refills:  0        ibuprofen 200 MG tablet   Commonly known as:  ADVIL/MOTRIN   Dose:  600 mg   Quantity:  40 tablet        Take 3 tablets (600 mg) by mouth every 6 hours as needed   Refills:  0        ketoconazole 2 % Foam        Externally apply topically twice a week   Refills:  0        LEXAPRO PO   Dose:  15 mg        Take 15 mg by mouth daily    Refills:  0        loratadine 10 MG tablet   Commonly known as:  CLARITIN   Dose:  10 mg        Take 10 mg by mouth daily as needed for allergies   Refills:  0        multivitamin, therapeutic Tabs tablet   Dose:  1 tablet        Take 1 tablet by mouth daily   Refills:  0        OMEPRAZOLE PO   Dose:  20 mg        Take 20 mg by mouth daily   Refills:  0        psyllium 0.52 g capsule   Dose:  1 capsule        Take 1 capsule by mouth daily   Refills:  0        sennosides 8.6 MG tablet   Commonly known as:  SENOKOT   Dose:  1 tablet        Take 1 tablet by mouth daily   Refills:  0        ZYPREXA PO   Dose:  5 mg        Take 5 mg by mouth At Bedtime   Refills:  0                Procedures and tests performed during your visit     Ankle XR, G/E 3 views, right    XR Hand Bilateral G/E 3 Views      Orders Needing Specimen Collection     None      Pending Results     No orders found from 5/2/2018 to 5/5/2018.            Pending Culture Results     No orders found from 5/2/2018 to 5/5/2018.            Pending Results Instructions     If you had any lab results that were not finalized at the time of your Discharge, you can call the ED Lab Result RN at [phone]. You will be contacted by this team for any positive Lab results or changes in treatment. The nurses are available 7 days a week from 10A to 6:30P.  You can leave a message 24 hours per day and they will return your call.        Test Results From Your Hospital Stay                    5/4/2018  1:30 PM      Narrative     XR ANKLE RT G/E 3 VW 5/4/2018 1:19 PM    HISTORY: Pain after trauma.    COMPARISON: 8/1/2009    FINDINGS: No acute fracture. 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        Impression     IMPRESSION: No acute osseous abnormality.    TONY NIX MD         5/4/2018  1:29 PM      Narrative     XR HAND BILATERAL G/E 3 VW 5/4/2018 1:22 PM    HISTORY: Pain after trauma.    COMPARISON:  None.    FINDINGS: 3 views of the right hand and 3 views of the left hand.  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 The bones of the  hands and wrists are demineralized. On one of the views of the left  wrist there appears to be an acute scaphoid fracture. No other  fractures are evident.        Impression     IMPRESSION: Acute left scaphoid fracture.    TONY NIX MD                Clinical Quality Measure: Blood Pressure Screening     Your blood pressure was checked while you were in the emergency department today. The last reading we obtained was  BP:  (Patient with continuous bent elbow and movement. Unable to measure.) . Please read the guidelines below about what these numbers mean and what you should do about them.  If your systolic blood pressure (the top number) is less than 120 and your diastolic blood pressure (the bottom number) is less than 80, then your blood pressure is normal. There is nothing more that you need to do about it.  If your systolic blood pressure (the top number) is 120-139 or your diastolic blood pressure (the bottom number) is 80-89, your blood pressure may be higher than it should be. You should have your blood pressure rechecked within a year by a primary care provider.  If your systolic blood pressure (the top number) is 140 or greater or your diastolic blood pressure (the bottom number) is 90 or greater, you may have high blood pressure. High blood pressure is treatable, but if left untreated over time it can put you at risk for heart attack, stroke, or kidney failure. You should have your blood pressure rechecked by a primary care provider within the next 4 weeks.  If your provider in the emergency department today gave you specific instructions to follow-up with your doctor or provider even sooner than that, you should follow that instruction and not wait for up to 4 weeks for your follow-up  visit.        Thank you for choosing [facility]       Thank you for choosing [facility] for your care. Our goal is always to provide you with excellent care. Hearing back from our patients is one way we can continue to improve our services. Please take a few minutes to complete the written survey that you may receive in the mail after you visit with us. Thank you!        [employer] Information     [employer] gives you secure access to your electronic health record. If you see a primary care provider, you can also send messages to your care team and make appointments. If you have questions, please call your primary care clinic.  If you do not have a primary care provider, please call [phone] and they will assist you.        Care EveryWhere ID     This is your Care EveryWhere ID. This could be used by other organizations to access your [facility] medical records  [id number]        Equal Access to Services     MARLENE CANALES : Nkechi Rivas, leroy ray, alexis may. So [facility] [phone].    ATENCIÓN: Si habla español, tiene a zhang disposición servicios gratuitos de asistencia lingüística. Llame al [phone].    We comply with applicable federal civil rights laws and [place] laws. We do not discriminate on the basis of race, color, national origin, age, disability, sex, sexual orientation, or gender identity.            After Visit Summary       This is your record. Keep this with you and show to your community pharmacist(s) and doctor(s) at your next visit.

## 2018-05-04 NOTE — ED AVS SNAPSHOT
[facility] Emergency Department    [address]    [facility] [address]    Phone:  [phone]    Fax:  [phone]                                       Gigi Lackey   MRN: [id number]    Department:  [facility] Emergency Department   Date of Visit:  5/4/2018           After Visit Summary Signature Page     I have received my discharge instructions, and my questions have been answered. I have discussed any challenges I see with this plan with the nurse or doctor.    ..........................................................................................................................................  Patient/Patient Representative Signature      ..........................................................................................................................................  Patient Representative Print Name and Relationship to Patient    ..................................................               ................................................  Date                                            Time    ..........................................................................................................................................  Reviewed by Signature/Title    ...................................................              ..............................................  Date                                                            Time

## 2018-05-04 NOTE — DISCHARGE INSTRUCTIONS
Navicular Wrist Fracture, Confirmed  You have a break (fracture) in one of the small bones of your wrist. This bone heals slowly. You may need to be in a cast for up to 3 months. Some navicular fractures don t heal the way they should. If so, you may need surgery at a later time.    Home care  Follow these guidelines when caring for yourself at home:    Keep your hand elevated to reduce pain and swelling. When sitting or lying down keep your arm above the level of your heart. You can do this by placing your arm on a pillow that rests on your chest or on a pillow at your side. This is most important during the first 2 days (48 hours) after the injury.    Put an ice pack on the injured area. Do this for 20 minutes every 1 to 2 hours the first day for pain relief. You can make an ice pack by wrapping a plastic bag of ice cubes in a thin towel. As the ice melts, be careful that the cast or splint doesn t get wet. Continue using the ice pack 3 to 4 times a day for the next 2 days. Then use the ice pack as needed to ease pain and swelling.    Keep the cast or splint completely dry at all times. Bathe with your cast or splint out of the water. Protect it with a large plastic bag, rubber-banded at the top end. If a fiberglass cast or splint gets wet, you can dry it with a hair dryer on the cool setting.    You may use acetaminophen or ibuprofen to control pain, unless another pain medicine was prescribed. If you have chronic liver or kidney disease, talk with your healthcare provider before using these medicines. Also talk with your provider if you ve had a stomach ulcer or gastrointestinal bleeding.    If you smoke, try to quit. Tobacco use can keep this fracture from healing the way it should. Smoking raises the risk that you will need surgery for this fracture.  Follow-up care  Follow up with your healthcare provider, or as advised. This is to make sure the bone is healing the way it should. If a splint was put on, it  may be changed to a cast during your follow-up visit. When the cast is removed, you will need to do special hand and wrist exercises. These will help you get back your wrist strength and range of motion. Some people have permanent stiffness in the wrist after this type of injury.  If X-rays were taken, a radiologist may look at them. You will be told of any new findings that may affect your care.  When to seek medical advice  Call your healthcare provider right away if any of these occur:    The cast or splint cracks    The plaster cast or splint becomes wet or soft    The fiberglass cast or splint stays wet for more than 24 hours    Tightness or pain under the cast or splint gets worse    Fingers become swollen, cold, blue, numb, or tingly    Fingers are hard to move    Bad odor from the cast or splint or wound fluid stains the cast or splint    The skin around the cast or splint becomes red, swollen, or irritated    Fever of 101 F (38.3 C) or higher, or as directed by your healthcare provider  Date Last Reviewed: 5/1/2017 2000-2017 [employer]. [address] [place] [address]. All rights reserved. This information is not intended as a substitute for professional medical care. Always follow your healthcare professional's instructions.

## 2019-08-26 ENCOUNTER — HOSPITAL ENCOUNTER (EMERGENCY)
Facility: CLINIC | Age: 64
Discharge: HOME | End: 2019-08-26
Attending: EMERGENCY MEDICINE | Admitting: EMERGENCY MEDICINE
Payer: MEDICARE

## 2019-08-26 VITALS
WEIGHT: 112 LBS | DIASTOLIC BLOOD PRESSURE: 62 MMHG | SYSTOLIC BLOOD PRESSURE: 119 MMHG | HEIGHT: 64 IN | RESPIRATION RATE: 20 BRPM | BODY MASS INDEX: 19.12 KG/M2 | HEART RATE: 100 BPM | TEMPERATURE: 98.1 F

## 2019-08-26 DIAGNOSIS — S00.33XA CONTUSION OF NOSE, INITIAL ENCOUNTER: ICD-10-CM

## 2019-08-26 DIAGNOSIS — S01.21XA LACERATION OF NOSE, INITIAL ENCOUNTER: ICD-10-CM

## 2019-08-26 PROCEDURE — 25000132 ZZH RX MED GY IP 250 OP 250 PS 637: Mod: GY | Performed by: EMERGENCY MEDICINE

## 2019-08-26 PROCEDURE — 99283 EMERGENCY DEPT VISIT LOW MDM: CPT

## 2019-08-26 PROCEDURE — 12011 RPR F/E/E/N/L/M 2.5 CM/<: CPT

## 2019-08-26 RX ORDER — ACETAMINOPHEN 325 MG/1
650 TABLET ORAL ONCE
Status: COMPLETED | OUTPATIENT
Start: 2019-08-26 | End: 2019-08-26

## 2019-08-26 RX ADMIN — ACETAMINOPHEN 650 MG: 325 TABLET ORAL at 10:26

## 2019-08-26 ASSESSMENT — ENCOUNTER SYMPTOMS
VOMITING: 0
WOUND: 1

## 2019-08-26 ASSESSMENT — MIFFLIN-ST. JEOR: SCORE: 1209.03

## 2019-08-26 NOTE — ED AVS SNAPSHOT
CC: Rash (both legs, ankles, knee)    Acute visit rash   HPI:    He is a 77 y.o. male with a history of paroxysmal atrial fibrillation, hypertension who presents for evaluation of rash    Interval hx   Last seen on May 5 for bradycardia. Patient was sent to the emergency room as he was symptomatic heart rate was 43 metoprolol 25 mg a stopped and Norvasc 2.5 mg discontinued Norvasc was increased to 5 mg with patient still taking 2.5 mg he saw Dr. Ruth Boss last week note not available for review yet. Patient denies dizziness. Has monitor his pulse at home and has been normal since stopping metoprolol. Had UTI  Symptoms went to  on 05/07 - urine culture no growth. Advised to stop ciprofloxacin. Appointment with urologist - August in [place]. Advised to move appointment soon  Saw Dr. Donelda Goodell for sleep study results pending-    Rash in legs: For the past 5 days patient has noted a pruritic rash in his lower legs bilaterally. First started on left leg around The area with some redness and itching which then developed a scab, and same rash noted on right leg around knee and lower right leg . Patient saw dermatologist in [place] 2 days prior to onset of rash. Only change in medications patient was prescribed ciprofloxacin in the urgent care center for possible UTI. No exposure to poison ivy. No history of psoriasis. Not using moisturizer consistently  Not taking anything for it at this time  No exacerbating features  No fever no chills      ROS:  12 systems reviewed and negative other than HPI    Past Medical History:   Diagnosis Date    Arthritis     DJD    Enlarged prostate     Hyperlipidemia     Hypertension     PAF [facility]oxysmal [facility] fibrillation) ([facility]     Pre-diabetes     HA1C 6.0 Dec 2016    Shingles        Current Outpatient Prescriptions on File Prior to Visit   Medication Sig Dispense Refill    tamsulosin (FLOMAX) 0.4 mg capsule Take 1 Cap by mouth daily for 15 days.  15 [facility]  [address]  [facility] [address]  Phone:  [phone]  Fax:  [phone]                                    Gigi Lackey   MRN: [id number]    Department:  [facility] Emergency Department   Date of Visit:  8/26/2019           After Visit Summary Signature Page    I have received my discharge instructions, and my questions have been answered. I have discussed any challenges I see with this plan with the nurse or doctor.    ..........................................................................................................................................  Patient/Patient Representative Signature      ..........................................................................................................................................  Patient Representative Print Name and Relationship to Patient    ..................................................               ................................................  Date                                   Time    ..........................................................................................................................................  Reviewed by Signature/Title    ...................................................              ..............................................  Date                                               Time          22EPIC Rev 08/18        0    diclofenac (VOLTAREN) 1 % gel APPLY 3-4 TIMES DAILY TO AFFECTED AREAS  3    fluorouracil (EFUDEX) 5 % chemo cream 1 APPLICATION APPLY ON THE SKIN TWICE A DAY APPLY TWICE DAILY FOR 14 DAYS  0    amLODIPine (NORVASC) 2.5 mg tablet Take 2 Tabs by mouth daily. 30 Tab 0    furosemide (LASIX) 40 mg tablet Take 1 Tab by mouth every other day. 15 Tab 2    lisinopril (PRINIVIL, ZESTRIL) 20 mg tablet Take 1 Tab by mouth daily. May resume medication when b/p greater than 120/80. 30 Tab 5    HYDROcodone-acetaminophen (NORCO) 5-325 mg per tablet       brimonidine (ALPHAGAN P) 0.1 % ophthalmic solution Administer 1 Drop to both eyes daily. Both eyes PM and lt eye in am also      doxazosin (CARDURA) 4 mg tablet Take 4 mg by mouth daily.  apixaban (ELIQUIS) 5 mg tablet Take 5 mg by mouth two (2) times a day.  celecoxib (CELEBREX) 200 mg capsule TAKE 1 TABLET BY MOUTH ONCE OR TWICE DAILY  3     No current facility-administered medications on file prior to visit. Past Surgical History:   Procedure Laterality Date    HX COLONOSCOPY      normal except for hemorrhoids    HX HEENT      wisdom teeth extraction x2    HX KNEE REPLACEMENT      right; 2017    HX SHOULDER ARTHROSCOPY      rt    HX TONSILLECTOMY         Family History   Problem Relation Age of Onset    Coronary Artery Disease Father 47      at [de-identified] of MI     Reviewed and no changes     Social History     Social History    Marital status:      Spouse name: N/A    Number of children: N/A    Years of education: N/A     Occupational History    Not on file.      Social History Main Topics    Smoking status: Never Smoker    Smokeless tobacco: Not on file    Alcohol use No    Drug use: Not on file    Sexual activity: Not on file     Other Topics Concern    Not on file     Social History Narrative            Visit Vitals    /69 (BP 1 Location: Right arm, BP Patient Position: Sitting)    Pulse 87    Temp 98.1 °F (36.7 °C) (Oral)    Resp 18    Ht 5' 8\" (1.727 m)    Wt 211 lb 6.4 oz (95.9 kg)    SpO2 99%    BMI 32.14 kg/m2       Physical Examination:   General - Well appearing male  HEENT - PERRL, TM no erythema/opacification, normal nasal turbinates, oropharynx no erythema or exudate, MMM  Neck - supple, no bruits, no TMG, no LAD  Pulm - clear to auscultation bilaterally  Cardio - RRR, normal S1 S2, no murmur gallops or rubs  Abd - soft, nontender, no masses, no HSM  Skin: Several patches of papular rash on erythematous base with dry skin/scaly appearance nontender not warm on right and only 1 on left leg around medial malleolus.   Some excoriation marks  The surgical scar healing on the right  Extrem -right leg with 1+ edema, left leg no swelling +2 distal pulses  Psych - normal affect, appropriate mood    Lab Results   Component Value Date/Time    WBC 11.2 05/05/2017 12:25 PM    HGB 13.3 05/05/2017 12:25 PM    HCT 40.6 05/05/2017 12:25 PM    PLATELET 316 21/67/6736 12:25 PM    MCV 79.6 05/05/2017 12:25 PM     Lab Results   Component Value Date/Time    Sodium 139 05/05/2017 12:25 PM    Potassium 4.3 05/05/2017 12:25 PM    Chloride 104 05/05/2017 12:25 PM    CO2 30 05/05/2017 12:25 PM    Anion gap 5 05/05/2017 12:25 PM    Glucose 95 05/05/2017 12:25 PM    BUN 23 05/05/2017 12:25 PM    Creatinine 1.12 05/05/2017 12:25 PM    BUN/Creatinine ratio 21 05/05/2017 12:25 PM    GFR est AA >60 05/05/2017 12:25 PM    GFR est non-AA >60 05/05/2017 12:25 PM    Calcium 8.5 05/05/2017 12:25 PM     Lab Results   Component Value Date/Time    Cholesterol, total 182 02/09/2017 12:07 PM    HDL Cholesterol 28 02/09/2017 12:07 PM    LDL, calculated 113 02/09/2017 12:07 PM    VLDL, calculated 41 02/09/2017 12:07 PM    Triglyceride 206 02/09/2017 12:07 PM     No results found for: TSH, TSH2, TSH3, TSHP, TSHEXT, TSHEXT  Lab Results   Component Value Date/Time    Prostate Specific Ag 4.2 02/09/2017 12:07 PM    % Free PSA 29.8 02/09/2017 12:07 PM Lab Results   Component Value Date/Time    Hemoglobin A1c 5.9 02/09/2017 12:07 PM     No results found for: Grant Paez VD3RIA    Lab Results   Component Value Date/Time    ALT (SGPT) 30 05/05/2017 12:25 PM    AST (SGOT) 11 05/05/2017 12:25 PM    Alk. phosphatase 58 05/05/2017 12:25 PM    Bilirubin, direct 0.11 02/09/2017 12:07 PM    Bilirubin, total 0.3 05/05/2017 12:25 PM           Assessment/Plan:  He is a 77 y.o. male with a history of paroxysmal atrial fibrillation, hypertension who presents for evaluation of rash-appearance of rash is consistent with eczema versus psoriasis. Patient does not have a history of psoriasis  Advised to see his dermatologist  -Discussed moisturizer skin with Eucerin twice a day  -Prescribed clobetasol 0.05 steroid cream to be used 2 times a day for 14 days  - clobetasol (TEMOVATE) 0.05 % topical cream; Apply  to affected area two (2) times a day. Dispense: 15 g; Refill: 0    1. Hematuria/recent urgent care visit for urinary symptoms. Urinary symptoms resolved. . Reviewed urine culture negative therefore advised to stop ciprofloxacin  -Repeat UA to see if hematuria resolved  - URINALYSIS W/ RFLX MICROSCOPIC  Advised to see his urologist sooner-     Patient already has follow-up for other chronic medical problems with me in 2 months    Follow-up Disposition:  Return if symptoms worsen or fail to improve.     Kiana Pereira MD

## 2019-08-26 NOTE — ED TRIAGE NOTES
"Pt fell this morning while getting out of bed, hit his face on a hope chest in his bedroom, this happened around 0530. Blood is dried in bilateral nares. Pt is intermittently screaming out. Care giver states this is part of his normal behavior but the screams usually last only a few minutes and then pt will laugh. He has not been laughing at all today. No LOC. Abrasion on right forearm near elbow, care giver states \"this may be new\".  Pt does not like to be touched.   "

## 2019-08-26 NOTE — ED PROVIDER NOTES
History     Chief Complaint:  Facial Injury    The history is provided by a caregiver.      Gigi Lackey is a 63 year old male from care home with known cognitive disorder who presents to the emergency department today with facial injury. The patient lives in a group home and his caregiver heard the patient fall while she was down the garza. The caregiver states the patient was getting out of the bed and was tangled up in the sheets and fell, hitting his face on the chest in the room around 0530. The patient has a bleeding nose laceration. Caregiver denies loss of consciousness. Patient ate this morning after the incident. Caregiver gave him his morning medication and 7.5 mg diazepam at 0720. The patient has not been vomiting. He seems at baseline, but he has been doing his normal yelling for longer than typical.  Of note, patient just started a ten day dose of Amoxicillin for pneumonia. Tetanus is up to date in 2015.     Allergies:  Actifed [Allerfrim]  Antihistamines, Chlorpheniramine-Type [Alkylamines]  Doxycycline  Sympathomimetics     Medications:    Diazepam  Depakote  Docusate sodium  Lexapro  Folic acid  Claritin  Zyprexa  Psyllium  Senokot      Past Medical History:    Anemia  Epilepsy  Osteoporosis  Profound intellectual disability  Pervasive developmental disorder  GERD  Phenylketonuria   DJD (degenerative joint disease), ankle and foot  History of enucleation of left eyeball  Mental retardation, profound (I.Q. < 20)  Nonintractable epilepsy without status epilepticus   Osteoarthritis  Osteoporosis  Psychosis  Seizure disorder   Urinary incontinence    Past Surgical History:    Orthopedic ankle surgery   Eye implant  Cataract extraction      Family History:    History reviewed. No pertinent family history.     Social History:  The patient was accompanied to the ED by caregiver.  Smoking Status: Never  Alcohol Use: No   Marital Status:  Single    Review of Systems   Unable to perform ROS: Patient  "nonverbal   Gastrointestinal: Negative for vomiting.   Skin: Positive for wound (nose laceration).   Neurological: Negative for syncope.       Physical Exam     Patient Vitals for the past 24 hrs:   BP Temp Temp src Pulse Resp Height Weight   08/26/19 1028 119/62 -- -- 100 -- -- --   08/26/19 0755 -- 98.1  F (36.7  C) Temporal -- 20 1.626 m (5' 4\") 50.8 kg (112 lb)      Physical Exam  General: Elderly male appearing older than stated age.   Eyes: Right pupil reactive. Unable to assess left pupil due to chronic clouding  ENT: Unable to visualize TMs due to difficulty with patient's poor cooperation and chronic appearing external ear deformities.  Dried blood in the bilateral nares. No visible septal hematoma or obvious deviation. Generalized edema of the nasal bridge. No asymmetry.  Neck: No rigidity. Normal spontaneous ROM.  CV: Normal S1S2.. Regular rate and rhythm  Resp: Clear to auscultation bilaterally. Normal respiratory effort.  GI: Abdomen is soft and nondistended.  MSK: Sitting in a wheelchair. Generalized muscle atrophy and contractures. Moves upper extremities in purposeful manner. No visible soft tissue edema or palpable deformity.  Skin: Warm and dry. 1.2 cm subcutaneous laceration over the bridge of the nose with slow oozing of red blood. Subacute appearing superficial abrasions over the right elbow and forearm. No rashes or lesions or ecchymoses on visible skin.  Neuro: Alert. Frequently squawks. Nonverbal. Purposeful movement. Pushes away attempts at exam.     Emergency Department Course   Procedures:    Laceration Repair        LACERATION:  A subcutaneous clean 1.2 cm laceration.      LOCATION:  Nasal bridge      ANESTHESIA:  None      PREPARATION:  Irrigation with Normal Saline      DEBRIDEMENT:  None      CLOSURE:  Wound was closed with Dermabond and 2 Steri Strips with good wound edge approximation.       Interventions:  1026: Tylenol 650 mg PO     Emergency Department Course:  Nursing notes and " vitals reviewed.  0800: I performed an exam of the patient as documented above.   I performed a laceration repair.   1019: Findings and plan explained to the caregiver. Patient discharged home with instructions regarding supportive care, medications, and reasons to return. The importance of close follow-up was reviewed.   I personally  answered all related questions prior to discharge.      Impression & Plan    Medical Decision Making:  Gigi Lackey is a 63 year old male from [facility] with known cognitive disorder who presents to the ER with concerns for a fall. He falls frequently and this time fell forward toward a bedside table, in which he sustained a nasal laceration contusion. He is at his baseline per staff in the ER. It was difficult to get a full exam because of his baseline mental status and his desire not to be touched. He is nonverbal. Based on clinical exam and history, intracranial hemorrhage seems unlikely. He had no evidence of contusion aside from the nose. No visible septal hematoma. The laceration was closed with steri strips and dermabond as the patient would not tolerate sutures. He did not decompensate at all throughout his stay and continued to act normally. Staff felt comfortable bringing him home. He is on Amoxicillin daily and did not need any antibiotic coverage for the laceration beyond that. He was recommended follow up with PCP as needed. Consider ENT if there is a visible nasal deformity after swelling is down. He appears symmetric. Most likely contusion versus minor nondisplaced fracture. All questions were answered prior to discharge.     Diagnosis:    ICD-10-CM    1. Contusion of nose, initial encounter S00.33XA    2. Laceration of nose, initial encounter S01.21XA        Disposition:  discharged to home     Scribe Disclosure:  I, Rosalind Felder MD, am serving as a scribe at 8:10 AM on 8/26/2019 to document services personally performed by Tabitha Diggs MD based on my  observations and the provider's statements to me.    8/26/2019   [facility] EMERGENCY DEPARTMENT       Tabitha Diggs MD  08/27/19 1987

## 2019-09-17 ENCOUNTER — HOSPITAL ENCOUNTER (INPATIENT)
Facility: CLINIC | Age: 64
LOS: 5 days | Discharge: NURSING, REHAB OR LONG-TERM CARE | DRG: 177 | End: 2019-09-22
Attending: EMERGENCY MEDICINE | Admitting: INTERNAL MEDICINE
Payer: MEDICARE

## 2019-09-17 ENCOUNTER — APPOINTMENT (OUTPATIENT)
Dept: CT IMAGING | Facility: CLINIC | Age: 64
DRG: 177 | End: 2019-09-17
Attending: EMERGENCY MEDICINE
Payer: MEDICARE

## 2019-09-17 ENCOUNTER — APPOINTMENT (OUTPATIENT)
Dept: GENERAL RADIOLOGY | Facility: CLINIC | Age: 64
DRG: 177 | End: 2019-09-17
Attending: EMERGENCY MEDICINE
Payer: MEDICARE

## 2019-09-17 DIAGNOSIS — G40.909 NONINTRACTABLE EPILEPSY WITHOUT STATUS EPILEPTICUS, UNSPECIFIED EPILEPSY TYPE (H): ICD-10-CM

## 2019-09-17 DIAGNOSIS — J69.0 ASPIRATION PNEUMONIA OF RIGHT LUNG, UNSPECIFIED ASPIRATION PNEUMONIA TYPE, UNSPECIFIED PART OF LUNG (H): ICD-10-CM

## 2019-09-17 DIAGNOSIS — R41.82 ALTERED MENTAL STATUS, UNSPECIFIED ALTERED MENTAL STATUS TYPE: ICD-10-CM

## 2019-09-17 DIAGNOSIS — E72.20 HYPERAMMONEMIA (H): ICD-10-CM

## 2019-09-17 PROBLEM — G93.40 ACUTE ENCEPHALOPATHY: Status: ACTIVE | Noted: 2019-09-17

## 2019-09-17 LAB
ALBUMIN SERPL-MCNC: 3.7 G/DL (ref 3.4–5)
ALBUMIN UR-MCNC: 10 MG/DL
ALP SERPL-CCNC: 86 U/L (ref 40–150)
ALT SERPL W P-5'-P-CCNC: 14 U/L (ref 0–70)
AMMONIA PLAS-SCNC: 76 UMOL/L (ref 10–50)
ANION GAP SERPL CALCULATED.3IONS-SCNC: 8 MMOL/L (ref 3–14)
APPEARANCE UR: CLEAR
AST SERPL W P-5'-P-CCNC: 19 U/L (ref 0–45)
BASE EXCESS BLDV CALC-SCNC: 2.8 MMOL/L
BASOPHILS # BLD AUTO: 0 10E9/L (ref 0–0.2)
BASOPHILS NFR BLD AUTO: 0.6 %
BILIRUB SERPL-MCNC: 0.3 MG/DL (ref 0.2–1.3)
BILIRUB UR QL STRIP: NEGATIVE
BUN SERPL-MCNC: 18 MG/DL (ref 7–30)
CALCIUM SERPL-MCNC: 9.4 MG/DL (ref 8.5–10.1)
CHLORIDE SERPL-SCNC: 108 MMOL/L (ref 94–109)
CO2 SERPL-SCNC: 27 MMOL/L (ref 20–32)
COLOR UR AUTO: YELLOW
CREAT SERPL-MCNC: 0.58 MG/DL (ref 0.66–1.25)
DIFFERENTIAL METHOD BLD: ABNORMAL
EOSINOPHIL # BLD AUTO: 0 10E9/L (ref 0–0.7)
EOSINOPHIL NFR BLD AUTO: 0.3 %
ERYTHROCYTE [DISTWIDTH] IN BLOOD BY AUTOMATED COUNT: 13 % (ref 10–15)
GFR SERPL CREATININE-BSD FRML MDRD: >90 ML/MIN/{1.73_M2}
GLUCOSE BLDC GLUCOMTR-MCNC: 112 MG/DL (ref 70–99)
GLUCOSE SERPL-MCNC: 119 MG/DL (ref 70–99)
GLUCOSE UR STRIP-MCNC: NEGATIVE MG/DL
HCO3 BLDV-SCNC: 28 MMOL/L (ref 21–28)
HCT VFR BLD AUTO: 38.8 % (ref 40–53)
HGB BLD-MCNC: 12.5 G/DL (ref 13.3–17.7)
HGB UR QL STRIP: NEGATIVE
HYALINE CASTS #/AREA URNS LPF: 1 /LPF (ref 0–2)
IMM GRANULOCYTES # BLD: 0 10E9/L (ref 0–0.4)
IMM GRANULOCYTES NFR BLD: 0.3 %
KETONES UR STRIP-MCNC: 10 MG/DL
LEUKOCYTE ESTERASE UR QL STRIP: NEGATIVE
LYMPHOCYTES # BLD AUTO: 1.3 10E9/L (ref 0.8–5.3)
LYMPHOCYTES NFR BLD AUTO: 20.4 %
MCH RBC QN AUTO: 32.5 PG (ref 26.5–33)
MCHC RBC AUTO-ENTMCNC: 32.2 G/DL (ref 31.5–36.5)
MCV RBC AUTO: 101 FL (ref 78–100)
MONOCYTES # BLD AUTO: 0.4 10E9/L (ref 0–1.3)
MONOCYTES NFR BLD AUTO: 6.2 %
MUCOUS THREADS #/AREA URNS LPF: PRESENT /LPF
NEUTROPHILS # BLD AUTO: 4.6 10E9/L (ref 1.6–8.3)
NEUTROPHILS NFR BLD AUTO: 72.2 %
NITRATE UR QL: NEGATIVE
NRBC # BLD AUTO: 0 10*3/UL
NRBC BLD AUTO-RTO: 0 /100
O2/TOTAL GAS SETTING VFR VENT: NORMAL %
OXYHGB MFR BLDV: 80 %
PCO2 BLDV: 44 MM HG (ref 40–50)
PH BLDV: 7.41 PH (ref 7.32–7.43)
PH UR STRIP: 6 PH (ref 5–7)
PLATELET # BLD AUTO: 294 10E9/L (ref 150–450)
PO2 BLDV: 47 MM HG (ref 25–47)
POTASSIUM SERPL-SCNC: 4.3 MMOL/L (ref 3.4–5.3)
PROT SERPL-MCNC: 7.3 G/DL (ref 6.8–8.8)
RBC # BLD AUTO: 3.85 10E12/L (ref 4.4–5.9)
RBC #/AREA URNS AUTO: 1 /HPF (ref 0–2)
SODIUM SERPL-SCNC: 143 MMOL/L (ref 133–144)
SOURCE: ABNORMAL
SP GR UR STRIP: 1.03 (ref 1–1.03)
TROPONIN I SERPL-MCNC: <0.015 UG/L (ref 0–0.04)
UROBILINOGEN UR STRIP-MCNC: NORMAL MG/DL (ref 0–2)
VALPROATE SERPL-MCNC: 29 MG/L (ref 50–100)
WBC # BLD AUTO: 6.3 10E9/L (ref 4–11)
WBC #/AREA URNS AUTO: 1 /HPF (ref 0–5)

## 2019-09-17 PROCEDURE — 71045 X-RAY EXAM CHEST 1 VIEW: CPT

## 2019-09-17 PROCEDURE — 70450 CT HEAD/BRAIN W/O DYE: CPT

## 2019-09-17 PROCEDURE — 99223 1ST HOSP IP/OBS HIGH 75: CPT | Mod: AI | Performed by: INTERNAL MEDICINE

## 2019-09-17 PROCEDURE — 80164 ASSAY DIPROPYLACETIC ACD TOT: CPT | Performed by: EMERGENCY MEDICINE

## 2019-09-17 PROCEDURE — 25800030 ZZH RX IP 258 OP 636: Performed by: INTERNAL MEDICINE

## 2019-09-17 PROCEDURE — 82805 BLOOD GASES W/O2 SATURATION: CPT | Performed by: EMERGENCY MEDICINE

## 2019-09-17 PROCEDURE — 85025 COMPLETE CBC W/AUTO DIFF WBC: CPT | Performed by: EMERGENCY MEDICINE

## 2019-09-17 PROCEDURE — 00000146 ZZHCL STATISTIC GLUCOSE BY METER IP

## 2019-09-17 PROCEDURE — 25000125 ZZHC RX 250: Performed by: EMERGENCY MEDICINE

## 2019-09-17 PROCEDURE — 25000132 ZZH RX MED GY IP 250 OP 250 PS 637: Mod: GY | Performed by: EMERGENCY MEDICINE

## 2019-09-17 PROCEDURE — 99285 EMERGENCY DEPT VISIT HI MDM: CPT | Mod: 25

## 2019-09-17 PROCEDURE — 81001 URINALYSIS AUTO W/SCOPE: CPT | Performed by: EMERGENCY MEDICINE

## 2019-09-17 PROCEDURE — 80053 COMPREHEN METABOLIC PANEL: CPT | Performed by: EMERGENCY MEDICINE

## 2019-09-17 PROCEDURE — 96372 THER/PROPH/DIAG INJ SC/IM: CPT

## 2019-09-17 PROCEDURE — 96374 THER/PROPH/DIAG INJ IV PUSH: CPT

## 2019-09-17 PROCEDURE — 93005 ELECTROCARDIOGRAM TRACING: CPT

## 2019-09-17 PROCEDURE — 84484 ASSAY OF TROPONIN QUANT: CPT | Performed by: EMERGENCY MEDICINE

## 2019-09-17 PROCEDURE — 12000000 ZZH R&B MED SURG/OB

## 2019-09-17 PROCEDURE — 82140 ASSAY OF AMMONIA: CPT | Performed by: EMERGENCY MEDICINE

## 2019-09-17 PROCEDURE — 25000128 H RX IP 250 OP 636: Performed by: EMERGENCY MEDICINE

## 2019-09-17 RX ORDER — MULTIPLE VITAMINS W/ MINERALS TAB 9MG-400MCG
1 TAB ORAL DAILY
COMMUNITY

## 2019-09-17 RX ORDER — FOLIC ACID 1 MG/1
1 TABLET ORAL DAILY
COMMUNITY

## 2019-09-17 RX ORDER — ACETAMINOPHEN 325 MG/1
650 TABLET ORAL EVERY 4 HOURS PRN
Status: DISCONTINUED | OUTPATIENT
Start: 2019-09-17 | End: 2019-09-18

## 2019-09-17 RX ORDER — LORAZEPAM 2 MG/ML
1 INJECTION INTRAMUSCULAR ONCE
Status: COMPLETED | OUTPATIENT
Start: 2019-09-17 | End: 2019-09-17

## 2019-09-17 RX ORDER — HALOPERIDOL 5 MG/ML
2 INJECTION INTRAMUSCULAR EVERY 6 HOURS PRN
Status: DISCONTINUED | OUTPATIENT
Start: 2019-09-17 | End: 2019-09-22 | Stop reason: HOSPADM

## 2019-09-17 RX ORDER — PANTOPRAZOLE SODIUM 40 MG/1
40 TABLET, DELAYED RELEASE ORAL 2 TIMES DAILY
COMMUNITY

## 2019-09-17 RX ORDER — DEXTROSE MONOHYDRATE, SODIUM CHLORIDE, AND POTASSIUM CHLORIDE 50; 1.49; 4.5 G/1000ML; G/1000ML; G/1000ML
INJECTION, SOLUTION INTRAVENOUS CONTINUOUS
Status: DISCONTINUED | OUTPATIENT
Start: 2019-09-17 | End: 2019-09-22 | Stop reason: HOSPADM

## 2019-09-17 RX ORDER — ESCITALOPRAM OXALATE 5 MG/1
5 TABLET ORAL DAILY
Status: DISCONTINUED | OUTPATIENT
Start: 2019-09-18 | End: 2019-09-17

## 2019-09-17 RX ORDER — PANTOPRAZOLE SODIUM 40 MG/1
40 TABLET, DELAYED RELEASE ORAL 2 TIMES DAILY
Status: DISCONTINUED | OUTPATIENT
Start: 2019-09-18 | End: 2019-09-18

## 2019-09-17 RX ORDER — NAPROXEN 500 MG/1
500 TABLET ORAL 2 TIMES DAILY WITH MEALS
COMMUNITY
End: 2021-01-14

## 2019-09-17 RX ORDER — DIVALPROEX SODIUM 250 MG/1
250 TABLET, DELAYED RELEASE ORAL EVERY MORNING
Status: ON HOLD | COMMUNITY
End: 2019-09-22

## 2019-09-17 RX ORDER — SENNOSIDES 8.6 MG
1300 CAPSULE ORAL 2 TIMES DAILY
Status: ON HOLD | COMMUNITY
End: 2021-05-31

## 2019-09-17 RX ORDER — CALCIUM CARBONATE 500 MG/1
1 TABLET, CHEWABLE ORAL EVERY MORNING
COMMUNITY
End: 2021-01-14

## 2019-09-17 RX ORDER — DOCUSATE SODIUM 100 MG/1
200 CAPSULE, LIQUID FILLED ORAL EVERY MORNING
COMMUNITY
End: 2021-01-14

## 2019-09-17 RX ORDER — AMOXICILLIN 250 MG
1 CAPSULE ORAL 2 TIMES DAILY PRN
Status: DISCONTINUED | OUTPATIENT
Start: 2019-09-17 | End: 2019-09-22 | Stop reason: HOSPADM

## 2019-09-17 RX ORDER — SENNOSIDES A AND B 8.6 MG/1
1 TABLET, FILM COATED ORAL DAILY
Status: ON HOLD | COMMUNITY
End: 2021-06-01

## 2019-09-17 RX ORDER — LORAZEPAM 2 MG/ML
0.5 INJECTION INTRAMUSCULAR EVERY 4 HOURS PRN
Status: DISCONTINUED | OUTPATIENT
Start: 2019-09-17 | End: 2019-09-22 | Stop reason: HOSPADM

## 2019-09-17 RX ORDER — ONDANSETRON 4 MG/1
4 TABLET, ORALLY DISINTEGRATING ORAL EVERY 6 HOURS PRN
Status: DISCONTINUED | OUTPATIENT
Start: 2019-09-17 | End: 2019-09-22 | Stop reason: HOSPADM

## 2019-09-17 RX ORDER — ONDANSETRON 2 MG/ML
4 INJECTION INTRAMUSCULAR; INTRAVENOUS EVERY 6 HOURS PRN
Status: DISCONTINUED | OUTPATIENT
Start: 2019-09-17 | End: 2019-09-22 | Stop reason: HOSPADM

## 2019-09-17 RX ORDER — LIDOCAINE 40 MG/G
CREAM TOPICAL
Status: DISCONTINUED | OUTPATIENT
Start: 2019-09-17 | End: 2019-09-22 | Stop reason: HOSPADM

## 2019-09-17 RX ORDER — NALOXONE HYDROCHLORIDE 0.4 MG/ML
.1-.4 INJECTION, SOLUTION INTRAMUSCULAR; INTRAVENOUS; SUBCUTANEOUS
Status: DISCONTINUED | OUTPATIENT
Start: 2019-09-17 | End: 2019-09-22 | Stop reason: HOSPADM

## 2019-09-17 RX ORDER — MIRTAZAPINE 15 MG/1
15 TABLET, FILM COATED ORAL AT BEDTIME
Status: DISCONTINUED | OUTPATIENT
Start: 2019-09-17 | End: 2019-09-18

## 2019-09-17 RX ORDER — OLANZAPINE 10 MG/2ML
10 INJECTION, POWDER, FOR SOLUTION INTRAMUSCULAR ONCE
Status: COMPLETED | OUTPATIENT
Start: 2019-09-17 | End: 2019-09-17

## 2019-09-17 RX ORDER — AMOXICILLIN 250 MG
2 CAPSULE ORAL 2 TIMES DAILY PRN
Status: DISCONTINUED | OUTPATIENT
Start: 2019-09-17 | End: 2019-09-22 | Stop reason: HOSPADM

## 2019-09-17 RX ORDER — MIRTAZAPINE 15 MG/1
15 TABLET, FILM COATED ORAL AT BEDTIME
COMMUNITY

## 2019-09-17 RX ADMIN — POTASSIUM CHLORIDE, DEXTROSE MONOHYDRATE AND SODIUM CHLORIDE: 150; 5; 450 INJECTION, SOLUTION INTRAVENOUS at 19:36

## 2019-09-17 RX ADMIN — LORAZEPAM 1 MG: 2 INJECTION INTRAMUSCULAR; INTRAVENOUS at 16:29

## 2019-09-17 RX ADMIN — LACTULOSE 200 G: 10 SOLUTION ORAL; RECTAL at 17:02

## 2019-09-17 RX ADMIN — OLANZAPINE 10 MG: 10 INJECTION, POWDER, FOR SOLUTION INTRAMUSCULAR at 14:13

## 2019-09-17 ASSESSMENT — ACTIVITIES OF DAILY LIVING (ADL): ADLS_ACUITY_SCORE: 22

## 2019-09-17 ASSESSMENT — MIFFLIN-ST. JEOR: SCORE: 1250.76

## 2019-09-17 ASSESSMENT — ENCOUNTER SYMPTOMS
VOMITING: 0
FEVER: 0

## 2019-09-17 NOTE — ED NOTES
[facility]  ED Nurse Handoff Report    Gigi Lackey is a 63 year old male   ED Chief complaint: Altered Mental Status  . ED Diagnosis:   Final diagnoses:   Hyperammonemia (H)   Altered mental status, unspecified altered mental status type     Allergies:   Allergies   Allergen Reactions     Actifed [Allerfrim]      Antihistamines, Chlorpheniramine-Type [Alkylamines]      Doxycycline      Morphine      Sympathomimetics        Code Status: Full Code  Activity level - Baseline/Home:  Total Care. Activity Level - Current:   Total Care. Lift room needed: Yes. Bariatric: No   Needed: No   Isolation: No. Infection: Not Applicable.     Vital Signs:   Vitals:    09/17/19 1349 09/17/19 1350 09/17/19 1507   BP: 118/51     Pulse: 71     Resp: 24     Temp:  97  F (36.1  C)    TempSrc:  Temporal    SpO2: 95%  97%       Cardiac Rhythm:  ,      Pain level:    Patient confused: Yes. Patient Falls Risk: Yes.   Elimination Status: Patient incontinent   Patient Report - Initial Complaint: Altered Mental Status. Focused Assessment: Patient arrives with group home staff reporting increased altered mental status. History of severe cognitive disability. Staff report increasing in drooling and decreased functioning. Of note, patient fell two weeks ago and was evaluated for closed head injury at that time. Per staff, patient will require a sitter. Patient impulsive and agitated by lines and wires. Presently, patient unable to ambulate however staff reports he normally is able with assist of 2.  Tests Performed: EKG, Lab work, Head CT scan, Urinalysis (to be obtained). Abnormal Results:   Labs Ordered and Resulted from Time of ED Arrival Up to the Time of Departure from the ED   CBC WITH PLATELETS DIFFERENTIAL - Abnormal; Notable for the following components:       Result Value    RBC Count 3.85 (*)     Hemoglobin 12.5 (*)     Hematocrit 38.8 (*)      (*)     All other components within normal limits    COMPREHENSIVE METABOLIC PANEL - Abnormal; Notable for the following components:    Glucose 119 (*)     Creatinine 0.58 (*)     All other components within normal limits   AMMONIA - Abnormal; Notable for the following components:    Ammonia 76 (*)     All other components within normal limits   GLUCOSE BY METER - Abnormal; Notable for the following components:    Glucose 112 (*)     All other components within normal limits   TROPONIN I   BLOOD GAS VENOUS AND OXYHGB   GLUCOSE MONITOR NURSING POCT   VALPROIC ACID   ROUTINE UA WITH MICROSCOPIC   PERIPHERAL IV CATHETER     Head CT w/o contrast   Preliminary Result   IMPRESSION: Mild cerebral atrophy. No evidence for intracranial   hemorrhage or any acute process.           .   Treatments provided: 10 mg Zyprexa (patient agitated), Lactulose enema, 1 mg ativan (prior to catheter and enema placement)  Family Comments: Group home staff at bedside  OBS brochure/video discussed/provided to patient:  N/A  ED Medications:   Medications   LORazepam (ATIVAN) injection 1 mg (has no administration in time range)   lactulose (CHRONULAC) 200 g in sterile water (bottle) 700 mL rectal enema (has no administration in time range)   OLANZapine (zyPREXA) injection 10 mg (10 mg Intramuscular Given 9/17/19 1413)     Drips infusing:  No  For the majority of the shift, the patient's behavior Yellow. Interventions performed were Calming utilizing staff, Administration of zyprexa.     Severe Sepsis OR Septic Shock Diagnosis Present: No      ED Nurse Name/Phone Number: Whitley Singh RN,   4:10 PM    RECEIVING UNIT ED HANDOFF REVIEW    Above ED Nurse Handoff Report was reviewed: Yes  Reviewed by: Kirstie Brand on September 17, 2019 at 5:40 PM

## 2019-09-17 NOTE — ED NOTES
Bed: ED25  Expected date: 9/17/19  Expected time: 1:34 PM  Means of arrival: Ambulance  Comments:  ambulance

## 2019-09-17 NOTE — PLAN OF CARE
"18:15 Text page sent to MD, \"Group home staff say pt is DNR/DNI.  They gave us some paperwork that also says that.  I put it on the chart\"    18:35 Text page sent to MD, \"Group home staff say that when pt is awake he is known to be combative and impulsive.  He pulls at lines and resists cares.  Could we get a PRN medication to use in case of behaviors? (He had 1mg Ativan in the ED) Thank you\"  "

## 2019-09-17 NOTE — ED PROVIDER NOTES
History     Chief Complaint:  Altered Mental Status      HPI   Gigi Lackey is a 63 year old male who presents with altered mental status.  Patient's group home staff is the primary historian.  Patient had presented to the ED approximately 3 weeks prior after a unwitnessed fall resulting in a nasal injury and facial laceration.  Since that time they have noticed that he has been mildly unstable but overall still able to ambulate and interact.  It was not until the last 24 to 48 hours that his symptoms have remarkably changed.  In that timeframe, the patient has been unable to ambulate and has required much higher level of assistance to mobilize.  He has been unable to ambulate even with assistance at this time.  He has been less interactive and more agitated with staff.  At baseline patient is able to interact with staff and make decisions utilizing his hands but is unable to verbally communicate.    Allergies:  Actifed [Allerfrim]  Antihistamines, Chlorpheniramine-Type [Alkylamines]  Doxycycline  Morphine  Sympathomimetics  Ethanolamine  Antipyrine-Benzocaine  Triprolidine-Pseudoephedrine    Medications:    Tylenol  Tums  Debrox  Diazepam  Depakote  Docusate sodium  Lexapro  Folic acid  Advil  Ketoconazole 2%  Claritin  Thera-vit  Zyprexa  Omeprazole  Psyllium  Senokot  Naproxen  Kenalog  Senna  Protonix  Theragran oral  Lexapro  Folic acid  Colace  Valium  Depakote  Debrox  Calcium carbonate  Tylenol    Past Medical History:    History of aspiration pneumonia  Oropharyngeal dysphagia  Full incontinence of feces  Avulsion of eye  Convulsions  Anemia  Epilepsy  GERD  Osteoporosis  Pervasive developmental disorder  Profound intellectual disability  Phenylketonuria  Mental retardation profound  Seizure disorder  Psychosis  Degenerative joint disease, ankle and foot  History of enucleation of left eyeball  Urinary incontinence  Osteoarthritis  Edentulism, complete    Past Surgical History:    Ankle surgery  Eye  surgery  Left eye enucleation  Cataract extraction w/ intraocular lens implant    Family History:    No past pertinent family history.    Social History:  Negative for tobacco use.  Negative for alcohol use.  Negative for drug use.  Marital Status:  Single [1]     Review of Systems   Unable to perform ROS: Patient nonverbal   Constitutional: Negative for fever.   Gastrointestinal: Negative for vomiting.   Skin: Negative for rash.       Physical Exam     Patient Vitals for the past 24 hrs:   BP Temp Temp src Pulse Heart Rate Resp SpO2   09/17/19 1507 -- -- -- -- -- -- 97 %   09/17/19 1350 -- 97  F (36.1  C) Temporal -- -- -- --   09/17/19 1349 118/51 -- -- 71 71 24 95 %     Physical Exam    General:   Developmentally disabled male lying in the bed  HEENT:    Oropharynx is moist, without lesions or trismus.  Eyes:    Right pupil equal and reactive     Left eye blind  Neck:    Supple, no meningismus.     CV:     Regular rate and rhythm.      No murmurs, rubs or gallops.       2+ radial pulses bilateral.       No lower extremity edema.  PULM:    Clear to auscultation bilateral.       No respiratory distress.      Good air exchange.     No rales or wheezing.  ABD:    Soft, non-tender, non-distended.       No pulsatile masses.       No rebound, guarding or rigidity.  MSK:     No gross deformity to all four extremities.      Upper and lower extremities taken through full ROM  LYMPH:   No cervical lymphadenopathy.  NEURO:   Alert. Nonverbal     Moves all 4 extremities     Strength grossly symmetric      No clonus  Skin:    Warm, dry and intact.    Psych:    Mild agitation     Intermittently cooperative        Emergency Department Course     ECG (14:36:44):  Rate 66 bpm. AR interval 188. QRS duration 84. QT/QTc 420/440. P-R-T axes 43 -43 44. Normal sinus rhythm. Left axis deviation. Low voltage QRS. Possible Inferior infarct, age undetermined. Abnormal ECG. No significant change compared to EKG dated 6/5/2016. Interpreted at  1458 by Ruben Pierre MD.    Laboratory:  Laboratory findings were communicated with the patient and family who voiced understanding of the findings.    CBC: HGB 12.5 L o/w WNL. (WBC 6.3, )   CMP: Glucose 119 H, Creatinine 0.58 L o/w WNL  Glucose by meter: 112 H  Troponin I (1536): <0.015  Blood gas venous and oxyhgb: AWNL  Ammonia (on ice): 76 H  Valporic acid: In process    Interventions:  1413: Zyprexa 10 mg IM     Emergency Department Course:  Past medical records, nursing notes, and vitals reviewed.    I performed an exam of the patient as documented above.     EKG obtained in the ED, see results above.     IV was inserted and blood was drawn for laboratory testing, results above.    Patient rechecked and updated.      Consulted Dr. Joshua of [facility] medicine [place] [phone]    I personally reviewed the laboratory and imaging results with the group home staff and answered all related questions prior to admission     Impression & Plan     Medical Decision Makin-year-old developmentally delayed male presented to the ED with depressed mental status from baseline and generalized weakness.  Patient has no obvious infectious pathology noted on his evaluation.  He has no evidence of electrolyte disturbance.  He was noted to have hyperammonemia in the absence of overt liver dysfunction.  He does take valproic acid daily for seizure prevention.  Valproic acid can induce hyperammonemia encephalopathy which appears to be the most likely source at this time.  We will hold his valproic acid, will need consideration of alternative antiepileptics and administration of lactulose.  Patient did have head trauma approximately 3 weeks prior thus head CT undertaken to ensure no acute intra-cranial process.  This is unremarkable.  Patient will be transferred to a medical bed for ongoing management.    Just prior to admission, staff had remarked that he has been coughing for the last couple days.  Chest x-ray is ordered  and currently pending.  Chest x-ray will be followed up by admitting team.      Discharge Diagnosis:    ICD-10-CM    1. Hyperammonemia (H) E72.20    2. Altered mental status, unspecified altered mental status type R41.82        Disposition:  Admit to medical bed    Scribe Disclosure:  I, Mary Jane Vilchis, am serving as a scribe at 3:23 PM on 9/17/2019 to document services personally performed by Ruben Pierre MD based on my observations and the provider's statements to me.     Mary Jane Vilchis  9/17/2019   [facility] EMERGENCY DEPARTMENT       Ruben Pierre MD  09/18/19 0908       Ruben Pierre MD  09/18/19 0909

## 2019-09-17 NOTE — ED TRIAGE NOTES
Pt comes from adult day care center for altered mental status. Pt fell 2 weeks ago and was evaluated at that time. EMS reports staff has noticed over the last 2 weeks pt has had a decrease in functioning. Pt is usually combative and screams loudly and is prone to self injury but has not been lately. Then today pt has not been able to walk and has started drooling which staff says he never does. Pt is non-verbal at baseline. Blood sugar 154. Pt lives in adult foster care/group home.

## 2019-09-17 NOTE — PLAN OF CARE
1800: Pt arrived to [facility] from ED and assisted into bed. Sitter at bedside.     2144: VSS. Admitted with AMS from group home. Pt has only right eye, approach from right side. Pt does not like to be touched. Resistive to cares. Non verbal this shift. NPO currently. At group home, pureed and thicken liquids diet. Group home staff informed us that he can be impulsive and combative at times, will pull at lines. PRN ativan and haldol available if needed. Sitter at bedside. No-no in place on IV tubing site.  Ammonia level was 76. Incontinent of bowel and bladder. 3 BMs this shift so far. Lactulose enema given in the ED. IVF: D5 .45 NaCl 20K running at 100/hr.

## 2019-09-18 LAB
ALBUMIN SERPL-MCNC: 3 G/DL (ref 3.4–5)
ALP SERPL-CCNC: 68 U/L (ref 40–150)
ALT SERPL W P-5'-P-CCNC: 12 U/L (ref 0–70)
AMMONIA PLAS-SCNC: 50 UMOL/L (ref 10–50)
ANION GAP SERPL CALCULATED.3IONS-SCNC: 8 MMOL/L (ref 3–14)
AST SERPL W P-5'-P-CCNC: 18 U/L (ref 0–45)
BILIRUB SERPL-MCNC: 0.4 MG/DL (ref 0.2–1.3)
BUN SERPL-MCNC: 19 MG/DL (ref 7–30)
CALCIUM SERPL-MCNC: 8.5 MG/DL (ref 8.5–10.1)
CHLORIDE SERPL-SCNC: 111 MMOL/L (ref 94–109)
CO2 SERPL-SCNC: 24 MMOL/L (ref 20–32)
CREAT SERPL-MCNC: 0.55 MG/DL (ref 0.66–1.25)
ERYTHROCYTE [DISTWIDTH] IN BLOOD BY AUTOMATED COUNT: 13 % (ref 10–15)
GFR SERPL CREATININE-BSD FRML MDRD: >90 ML/MIN/{1.73_M2}
GLUCOSE SERPL-MCNC: 131 MG/DL (ref 70–99)
HCT VFR BLD AUTO: 36.4 % (ref 40–53)
HGB BLD-MCNC: 11.7 G/DL (ref 13.3–17.7)
INTERPRETATION ECG - MUSE: NORMAL
L PNEUMO1 AG UR QL IA: NORMAL
MCH RBC QN AUTO: 32.1 PG (ref 26.5–33)
MCHC RBC AUTO-ENTMCNC: 32.1 G/DL (ref 31.5–36.5)
MCV RBC AUTO: 100 FL (ref 78–100)
PLATELET # BLD AUTO: 247 10E9/L (ref 150–450)
POTASSIUM SERPL-SCNC: 4.1 MMOL/L (ref 3.4–5.3)
PROT SERPL-MCNC: 6.3 G/DL (ref 6.8–8.8)
RBC # BLD AUTO: 3.64 10E12/L (ref 4.4–5.9)
S PNEUM AG SPEC QL: NORMAL
S PNEUM AG SPEC QL: NORMAL
SODIUM SERPL-SCNC: 143 MMOL/L (ref 133–144)
SPECIMEN SOURCE: NORMAL
SPECIMEN SOURCE: NORMAL
WBC # BLD AUTO: 9.6 10E9/L (ref 4–11)

## 2019-09-18 PROCEDURE — 40000275 ZZH STATISTIC RCP TIME EA 10 MIN

## 2019-09-18 PROCEDURE — 87899 AGENT NOS ASSAY W/OPTIC: CPT | Performed by: INTERNAL MEDICINE

## 2019-09-18 PROCEDURE — 85027 COMPLETE CBC AUTOMATED: CPT | Performed by: INTERNAL MEDICINE

## 2019-09-18 PROCEDURE — 25800030 ZZH RX IP 258 OP 636: Performed by: INTERNAL MEDICINE

## 2019-09-18 PROCEDURE — 25000125 ZZHC RX 250: Performed by: INTERNAL MEDICINE

## 2019-09-18 PROCEDURE — 99233 SBSQ HOSP IP/OBS HIGH 50: CPT | Performed by: INTERNAL MEDICINE

## 2019-09-18 PROCEDURE — 80053 COMPREHEN METABOLIC PANEL: CPT | Performed by: INTERNAL MEDICINE

## 2019-09-18 PROCEDURE — C9113 INJ PANTOPRAZOLE SODIUM, VIA: HCPCS | Performed by: INTERNAL MEDICINE

## 2019-09-18 PROCEDURE — 36415 COLL VENOUS BLD VENIPUNCTURE: CPT | Performed by: INTERNAL MEDICINE

## 2019-09-18 PROCEDURE — 12000000 ZZH R&B MED SURG/OB

## 2019-09-18 PROCEDURE — 25000128 H RX IP 250 OP 636: Performed by: INTERNAL MEDICINE

## 2019-09-18 PROCEDURE — 82140 ASSAY OF AMMONIA: CPT | Performed by: INTERNAL MEDICINE

## 2019-09-18 PROCEDURE — 40000983 ZZH STATISTIC HFNC ADULT NON-CPAP

## 2019-09-18 PROCEDURE — 25000132 ZZH RX MED GY IP 250 OP 250 PS 637: Mod: GY | Performed by: INTERNAL MEDICINE

## 2019-09-18 RX ORDER — ACETAMINOPHEN 325 MG/1
650 TABLET ORAL EVERY 4 HOURS PRN
Status: DISCONTINUED | OUTPATIENT
Start: 2019-09-18 | End: 2019-09-22 | Stop reason: HOSPADM

## 2019-09-18 RX ORDER — CEFTRIAXONE 1 G/1
1 INJECTION, POWDER, FOR SOLUTION INTRAMUSCULAR; INTRAVENOUS EVERY 24 HOURS
Status: DISCONTINUED | OUTPATIENT
Start: 2019-09-18 | End: 2019-09-19

## 2019-09-18 RX ORDER — ACETAMINOPHEN 650 MG/1
650 SUPPOSITORY RECTAL EVERY 4 HOURS PRN
Status: DISCONTINUED | OUTPATIENT
Start: 2019-09-18 | End: 2019-09-22 | Stop reason: HOSPADM

## 2019-09-18 RX ADMIN — AZITHROMYCIN MONOHYDRATE 500 MG: 500 INJECTION, POWDER, LYOPHILIZED, FOR SOLUTION INTRAVENOUS at 04:05

## 2019-09-18 RX ADMIN — PANTOPRAZOLE SODIUM 40 MG: 40 INJECTION, POWDER, FOR SOLUTION INTRAVENOUS at 10:26

## 2019-09-18 RX ADMIN — VALPROATE SODIUM 250 MG: 100 INJECTION, SOLUTION INTRAVENOUS at 20:40

## 2019-09-18 RX ADMIN — LACTULOSE 200 G: 10 SOLUTION ORAL; RECTAL at 22:36

## 2019-09-18 RX ADMIN — CEFTRIAXONE SODIUM 1 G: 1 INJECTION, POWDER, FOR SOLUTION INTRAMUSCULAR; INTRAVENOUS at 03:11

## 2019-09-18 RX ADMIN — POTASSIUM CHLORIDE, DEXTROSE MONOHYDRATE AND SODIUM CHLORIDE: 150; 5; 450 INJECTION, SOLUTION INTRAVENOUS at 09:24

## 2019-09-18 RX ADMIN — LORAZEPAM 0.5 MG: 2 INJECTION INTRAMUSCULAR; INTRAVENOUS at 10:30

## 2019-09-18 RX ADMIN — LACTULOSE 200 G: 10 SOLUTION ORAL; RECTAL at 11:44

## 2019-09-18 RX ADMIN — HALOPERIDOL LACTATE 2 MG: 5 INJECTION INTRAMUSCULAR at 16:08

## 2019-09-18 RX ADMIN — PANTOPRAZOLE SODIUM 40 MG: 40 INJECTION, POWDER, FOR SOLUTION INTRAVENOUS at 20:38

## 2019-09-18 ASSESSMENT — ACTIVITIES OF DAILY LIVING (ADL)
ADLS_ACUITY_SCORE: 43
ADLS_ACUITY_SCORE: 38
ADLS_ACUITY_SCORE: 41
ADLS_ACUITY_SCORE: 43

## 2019-09-18 NOTE — PROGRESS NOTES
Patient wore HFNC 30L,70% t/o shift. SPO2 88-94%. BBS coarse/ diminished.     Patient suctioned with yankauer t/o shift. Suctioned for small, cloudy, thick, secretions.

## 2019-09-18 NOTE — PROGRESS NOTES
RT paged to assess pt on 6L Oxymask with SPO2 85%. Breath sounds are coarse and diminished, particularly in RML & RLL. Pt has weak cough and unable to effectively clear secretions without oral suctioning. BIPAP is considered but contraindicated for now because of the amount of secretions. Adult High Flow Nasal Canula has been initiated to reduce work of breathing. Pt SPO2 has improved to 95% and WOB has decreased. Respiratory will continue to follow.

## 2019-09-18 NOTE — H&P
[facility]  Hospitalist Admission Note  September 17, 2019  Name: Gigi Lackey    MRN: [id number]  YOB: 1955    Age: 63 year old  Date of admission: 9/17/2019  Primary care provider: Janeth Paz      Summary:  Patient is a 63-year-old male who is a state aponte with a past medical history significant for phenylketonuria, mentally development disorder, epilepsy, GERD, and chronic anemia who is nonverbal at baseline who presented here for altered mental status.  Notably, patient was seen here 3 weeks ago after an unwitnessed fall that resulted in a nasal injury and facial laceration which had been fixed.  Since then, they noted that he has been a bit unsteady but overall was able to interact and ambulate with assist.  However, over the past 24 to 48 hours, his symptoms have remarkably changed and was unable to ambulate and required a higher level assistance to transfer.  They also noted that he has been less interactive and had periods where he was quite agitated.  At baseline, patient apparently is able to interact with staff and make decisions by using his hands but is nonverbal.  History is limited at this time.     Problem list/Plan:  1. Acute encephalopathy: CT of the head was negative. Only significant clinical findings is an elevated ammonia levels which could be due to his Depakote.  Depakote levels are otherwise actually subtherapeutic.  We will hold Depakote for now.  Continue lactulose retention enemas which was started in the ED with goal of 2-3 bowel movements daily.  On my assessment, patient already had 2 large loose bowel movements.  Will repeat ammonia levels in the morning and reassess mental status on a daily basis. Patient can be highly agitated and pulling lines when he is awake.  Will have PRN low-dose Ativan and Haldol available.  2. History of seizure disorder: Chronically on Depakote for maintenance.  No recent seizure events.  Will be  holding Depakote secondary to elevated ammonia levels and request formal neurology consultation to discuss other antiepileptic drugs.  3. Mentally developmental delay: Patient is a state aponte but care is to restorative up until heroic measures.  At risk for delirium.  4. History of phenylketonuria  5. Chronic anemia: No evidence of acute blood loss anemia.  Hemoglobin is stable at 12.5.  Last hemoglobin back in 2016 was 10.8.  Continue to monitor for now.  6. Aspiration precautions as patient does seem to be drooling at times and not swallowing saliva: Suction oral secretions as needed.  Lungs otherwise clear.  Discussed with nursing staff.      -Additional workup plan which will include neurology consultation    All lab work and imaging data independently reviewed by myself    Prophylaxis;  PCD/Ambulation.     Code status: DNR/DNI.  Patient is a state aponte and copy of letter from guardian noted.  No heroic measures as patient's quality of life has been progressively declining over the past couple years.    Discharge: Suspect back to adult foster care when able    Time spent: Greater than 40 minutes given complexity of patient's comorbidities, inability to offer history and need to gather collateral information from medical records and communication with adult foster care staffing.    Chief Complaint:   Altered mental status   HPI history limited secondary to patient being nonverbal, altered mental status, and somnolence after medications were given in the ED for his agitation.  History assisted by sign out from ED physician and review of epic records  Patient is a 63-year-old male who is a state aponte with a past medical history significant for phenylketonuria, mentally development disorder, epilepsy, GERD, and chronic anemia who is nonverbal at baseline who presented here for altered mental status.  Notably, patient was seen here 3 weeks ago after an unwitnessed fall that resulted in a nasal injury and facial  laceration which had been fixed.  Since then, they noted that he has been a bit unsteady but overall was able to interact and ambulate with assist.  However, over the past 24 to 48 hours, his symptoms have remarkably changed and was unable to ambulate and required a higher level assistance to transfer.  They also noted that he has been less interactive and had periods where he was quite agitated.  At baseline, patient apparently is able to interact with staff and make decisions by using his hands but is nonverbal.  History is limited at this time.  I did discuss the case in detail with the ED physician.     Past Medical History:     Past Medical History:   Diagnosis Date     Anemia      Epilepsy (H)      Gastro-oesophageal reflux disease      Osteoporosis      Pervasive developmental disorder      Profound intellectual disability    Phenylketonuria  Past Surgical History:     Past Surgical History:   Procedure Laterality Date     ORTHOPEDIC SURGERY      ankle     Social History:     Social History     Tobacco Use     Smoking status: Never Smoker   Substance Use Topics     Alcohol use: No     Drug use: No     Family History:  Family history reviewed. NO pertinent family history     Allergies:     Allergies   Allergen Reactions     Actifed [Allerfrim]      Antihistamines, Chlorpheniramine-Type [Alkylamines]      Doxycycline      Morphine      Sympathomimetics      Medications:     Medications Prior to Admission   Medication Sig Dispense Refill Last Dose     acetaminophen (ACETAMINOPHEN 8 HOUR) 650 MG CR tablet Take 1,300 mg by mouth 2 times daily   9/17/2019 at x1     calcium carbonate (TUMS) 500 MG chewable tablet Take 2 chew tab by mouth daily in afternoon   9/16/2019 at Afternoon     carbamide peroxide (DEBROX) 6.5 % otic solution Place 4 drops into both ears Daily for 5 days of each month. Begin on the first Sunday of each month.   9/5/2019     divalproex sodium delayed-release (DEPAKOTE) 500 MG DR tablet Take 500  "mg by mouth At Bedtime    9/16/2019 at HS     escitalopram (LEXAPRO) 5 MG tablet Take 5 mg by mouth daily    9/17/2019 at AM     mirtazapine (REMERON) 15 MG tablet Take 15 mg by mouth At Bedtime   9/16/2019 at HS     OLANZapine (ZYPREXA) 15 MG tablet Take 15 mg by mouth At Bedtime    9/16/2019 at HS     pantoprazole (PROTONIX) 40 MG EC tablet Take 40 mg by mouth 2 times daily   9/17/2019 at x1       Review of Systems:   A Comprehensive greater than 10 system review of systems was carried out.  Pertinent positives and negatives are noted above.  Otherwise negative for contributory information.        Physical Exam:  Blood pressure (!) 151/71, pulse 71, temperature 95.2  F (35.1  C), temperature source Axillary, resp. rate 24, height 1.626 m (5' 4\"), weight 55 kg (121 lb 3.2 oz), SpO2 93 %.  Gen: Somnolent, nonverbal at baseline.  Some notable drooling and moans when touched which is normal per adult foster staff report to nursing.  Laying on the right side in the fetal position. Otherwise, in no acute distress.  HEENT: NCAT. EOMI. PERRL.  Neck: Normal inspection. No bruit, JVD or thyromegaly.  Lungs: Normal respiratory effort. Clear to auscultation bilaterally with no crackles or wheezes.  Card: N s1s2. RRR. No M/R/G.  Peripheral pulses present and symmetric.   Skin: No rash. Warm to the touch  Extr: No edema. CMS intact  Psychiatric: Unable to assess  Neurologic: Mobilizes all 4 extremities but unable to participate in a thorough neuro exam    Data:     Recent Labs   Lab 09/17/19  1503   WBC 6.3   HGB 12.5*   HCT 38.8*   *        Recent Labs   Lab 09/17/19  1503      POTASSIUM 4.3   CHLORIDE 108   CO2 27   ANIONGAP 8   *   BUN 18   CR 0.58*   GFRESTIMATED >90   GFRESTBLACK >90   CHAUNCEY 9.4   PROTTOTAL 7.3   ALBUMIN 3.7   BILITOTAL 0.3   ALKPHOS 86   AST 19   ALT 14     Recent Labs   Lab 09/17/19  1702   COLOR Yellow   APPEARANCE Clear   URINEGLC Negative   URINEBILI Negative   URINEKETONE 10* "   SG 1.033   UBLD Negative   URINEPH 6.0   PROTEIN 10*   NITRITE Negative   LEUKEST Negative   RBCU 1   WBCU 1       Imaging:   Recent Results (from the past 24 hour(s))   Head CT w/o contrast    Narrative    CT SCAN OF THE HEAD WITHOUT CONTRAST   9/17/2019 4:04 PM     HISTORY: History of developmental delay; worsening confusion. Recent  fall.    TECHNIQUE: Axial images of the head and coronal reformations without  IV contrast material. Radiation dose for this scan was reduced using  automated exposure control, adjustment of the mA and/or kV according  to patient size, or iterative reconstruction technique.    COMPARISON: 8/22/2017.    FINDINGS: There is some mild cerebral atrophy. The brain parenchyma is  otherwise normal. There is no evidence for intracranial hemorrhage,  mass effect, acute infarct, or skull fracture.      Impression    IMPRESSION: Mild cerebral atrophy. No evidence for intracranial  hemorrhage or any acute process.      PAULO JOHN MD   XR Chest 1 View    Narrative    EXAM: XR CHEST 1 VW  LOCATION: [facility]  DATE/TIME: 9/17/2019 5:42 PM    INDICATION: Altered mental status  COMPARISON: None      Impression    IMPRESSION: Suboptimal ventilatory effort with compressive and congestive change. Possible superimposed right lower lobe pneumonia. Retrocardiac consolidation and atelectasis. Heart and central vessels unremarkable. Multiple old rib fractures. Comparison   with prior would be most helpful, if none recommend follow-up.       Jabari Joshua MD Pager [phone]

## 2019-09-18 NOTE — PLAN OF CARE
Patient nonverbal, baseline  Unable to assess oriented  Would not open eyes this shift, even with repeat stimulation  Moves self in bed and can be combative with cares/repo  PRN ativan given x1 prior to enema  Peripheral IV infusing continuous fluids  Lungs dim/coarse  VSS, on 70% FiO2, 30 LPM  Assist x2 with repos  NPO  Medications switched IV  Urine sample collected this shift  Incontinent of urine and stool  Post void residual at 16  Receiving lactulose enemas, had 1 loose BM this shift post enema  Plan to discharge back to [facility]

## 2019-09-18 NOTE — PHARMACY-ADMISSION MEDICATION HISTORY
Admission medication history interview status for this patient is complete. See [facility] admission navigator for allergy information, prior to admission medications and immunization status.     Medication history interview source(s):none  Medication history resources (including written lists, pill bottles, clinic record):MAR of page 1 and 3 came on the fax, [employer] ([employer]) received page 2 of the MAR only.       Changes made to PTA medication list:  Added: MVI, colace, Tums in the am, folic acid, senna, vitamin D, depakote qam dosing, metamucil, naproxyn,   Deleted: none  Changed: lexapro from 5 to 15mg daily    Actions taken by pharmacist (provider contacted, etc):None     Additional medication history information:None    Medication reconciliation/reorder completed by provider prior to medication history? Yes    For patients on insulin therapy: no (Yes/No)   Lantus/levemir/NPH/Mix 70/30 dose: ___ in AM/PM or twice daily   Sliding scale Novolog Y/N   If Yes, do you have a baseline novolog pre-meal dose: ______units with meals   Patients eat three meals a day: Y/N ---  How many episodes of hypoglycemia (low blood glucose) do you have weekly: ---   How many missed doses do you have a week: ---  How many times do you check your blood glucose per day: ---  Any Barriers to therapy: cost of medications/comfortable with giving injections (if applicable)/ comfortable and confident with current diabetes regimen ---      Prior to Admission medications    Medication Sig Last Dose Taking? Auth Provider   acetaminophen (ACETAMINOPHEN 8 HOUR) 650 MG CR tablet Take 1,300 mg by mouth 2 times daily 9/17/2019 at x1 Yes Unknown, Entered By History   calcium carbonate (TUMS) 500 MG chewable tablet Take 1 chew tab by mouth every morning 9/17/2019 at am Yes Unknown, Entered By History   calcium carbonate (TUMS) 500 MG chewable tablet Take 2 chew tab by mouth daily in afternoon 9/16/2019 at Afternoon Yes Reported, Patient   carbamide  peroxide (DEBROX) 6.5 % otic solution Place 4 drops into both ears Daily for 5 days of each month. Begin on the first Sunday of each month. 9/5/2019 Yes Reported, Patient   divalproex sodium delayed-release (DEPAKOTE) 250 MG DR tablet Take 250 mg by mouth every morning 9/17/2019 at Unknown time Yes Unknown, Entered By History   divalproex sodium delayed-release (DEPAKOTE) 500 MG DR tablet Take 500 mg by mouth At Bedtime  9/16/2019 at HS Yes Reported, Patient   docusate sodium (COLACE) 100 MG capsule Take 200 mg by mouth every morning 9/17/2019 at Unknown time Yes Unknown, Entered By History   escitalopram (LEXAPRO) 5 MG tablet Take 15 mg by mouth daily  9/17/2019 at AM Yes Reported, Patient   folic acid (FOLVITE) 1 MG tablet Take 1 mg by mouth daily 9/17/2019 at am Yes Unknown, Entered By History   mirtazapine (REMERON) 15 MG tablet Take 15 mg by mouth At Bedtime 9/16/2019 at HS Yes Unknown, Entered By History   multivitamin w/minerals (MULTI-VITAMIN) tablet Take 1 tablet by mouth daily 9/17/2019 at Unknown time Yes Unknown, Entered By History   naproxen (NAPROSYN) 500 MG tablet Take 500 mg by mouth 2 times daily (with meals) 9/17/2019 at am Yes Unknown, Entered By History   OLANZapine (ZYPREXA) 15 MG tablet Take 15 mg by mouth At Bedtime  9/16/2019 at HS Yes Reported, Patient   pantoprazole (PROTONIX) 40 MG EC tablet Take 40 mg by mouth 2 times daily 9/17/2019 at x1 Yes Unknown, Entered By History   psyllium (METAMUCIL/KONSYL) 58.6 % powder Take 1 Tablespoonful by mouth every morning 9/17/2019 at Unknown time Yes Unknown, Entered By History   senna (SENOKOT) 8.6 MG tablet Take 1 tablet by mouth daily 9/17/2019 at am Yes Unknown, Entered By History   vitamin D3 (CHOLECALCIFEROL) 1000 units (25 mcg) tablet Take by mouth daily 9/17/2019 at am Yes Unknown, Entered By History

## 2019-09-18 NOTE — PROGRESS NOTES
MD and RT notified of pt's respiratory status. Upon routine vitals NST observed O2 sats to be in the 70s on room air. Placed on nasal cannula 5L with sats improved to low 80s. Changed to oxymask 6L with minimal improvement low to mid 80's. RT notified and MD paged regarding poor respiratory status. RT recommending high flow O2. BiPap considered however pt has been requiring intermittent suctioning for secretions.

## 2019-09-18 NOTE — PROGRESS NOTES
Cross Cover    called with report of increased WOB and hypoxia.  RT already at the bedside with nurses.  The patient is now stable on high flow nasal cannula.    Chart reviewed and pt evaluated at bedside.   Rales and rhonchi on right base. CXR is an awkward film, difficult for me to interpret, but radiology seems to think the right lower lobe may have an infiltrate.    Empirically start antibiotics for community acquired pneumonia.  I note that the patient is having some diarrhea as well.  Urine antigens are sent for Legionella as well as pneumococcus.    KP

## 2019-09-18 NOTE — PROVIDER NOTIFICATION
UC positive weak positive for Streptococcus pneumoniae. MD notified. Patient on IV Rocephin and Azithromycin.

## 2019-09-18 NOTE — PHARMACY-ADMISSION MEDICATION HISTORY
Admission medication history interview status for this patient is complete. See [facility] admission navigator for allergy information, prior to admission medications and immunization status.     Medication history interview source(s):  Medication history resources (including written lists, pill bottles, clinic record): [facility] [phone]  Primary pharmacy:    Changes made to PTA medication list:  Added: mirtazapine, olanzapine, pantoprazole;  Deleted: diazepam, docusate, folic acid, ibuprofen, ketoconazole foam, loratadine, multivitamin, omeprazole, psyllium, sennosides  Changed: divalproex DR 250mg AM + 500mg HS --> 500mg HS; escitalopram 15mg --> 5mg; olanzapine 5mg --> 15mg; acetaminophen 325mg Q4H PRN --> 1350mg CR BID; Tums BID --> daily;     Actions taken by pharmacist (provider contacted, etc):None     Additional medication history information:None    Medication reconciliation/reorder completed by provider prior to medication history? No    Do you take OTC medications (eg tylenol, ibuprofen, fish oil, eye/ear drops, etc)? Listed          Prior to Admission medications    Medication Sig Last Dose Taking? Auth Provider   acetaminophen (ACETAMINOPHEN 8 HOUR) 650 MG CR tablet Take 1,300 mg by mouth 2 times daily 9/17/2019 at x1 Yes Unknown, Entered By History   calcium carbonate (TUMS) 500 MG chewable tablet Take 2 chew tab by mouth daily in afternoon 9/16/2019 at Afternoon Yes Reported, Patient   carbamide peroxide (DEBROX) 6.5 % otic solution Place 4 drops into both ears Daily for 5 days of each month. Begin on the first Sunday of each month. 9/5/2019 Yes Reported, Patient   divalproex sodium delayed-release (DEPAKOTE) 500 MG DR tablet Take 500 mg by mouth At Bedtime  9/16/2019 at HS Yes Reported, Patient   escitalopram (LEXAPRO) 5 MG tablet Take 5 mg by mouth daily  9/17/2019 at AM Yes Reported, Patient   mirtazapine (REMERON) 15 MG tablet Take 15 mg by mouth At Bedtime 9/16/2019 at HS  Yes Unknown, Entered By History   OLANZapine (ZYPREXA) 15 MG tablet Take 15 mg by mouth At Bedtime  9/16/2019 at HS Yes Reported, Patient   pantoprazole (PROTONIX) 40 MG EC tablet Take 40 mg by mouth 2 times daily 9/17/2019 at x1 Yes Unknown, Entered By History

## 2019-09-18 NOTE — PROGRESS NOTES
[facility]  Hospitalist Progress Note  Ted Clinton MD 09/18/19    Reason for Stay (Diagnosis): encephalopathy, aspiration pneumonia         Assessment and Plan:      Summary of Stay: Gigi Lackey is a 63-year-old male who is a state aponte w/ guardian with a past medical history significant for phenylketonuria, mental development disorder, epilepsy, GERD, and chronic anemia who is nonverbal at baseline who presented here for altered mental status on 9/17/19.  Notably, patient was seen here 3 weeks ago after an unwitnessed fall that resulted in a nasal injury and facial laceration which had been fixed.  Since then, they noted that he has been a bit unsteady but overall was able to interact and ambulate with assist.  However, over the 24 to 48 hours prior to admission his symptoms have remarkably changed and was unable to ambulate and required a higher level assistance to transfer.  They also noted that he has been less interactive and had periods where he was quite agitated.  At baseline, patient apparently is able to interact with staff and make his needs known by using his hands but is nonverbal.     Gigi was found to have elevated ammonia level of 76 prompting us to hold his depakote and start lactulose enemas.  Furthermore, the night of admission he was noted to have increased work of breathing and rising oxygen requirement.  Chest x-ray appears to show likely right lower lobe pneumonia.  He was started on IV antibiotics and has a significant oxygen requirement.    I did contact his guardian, Rosita, with an update the morning of 9/17.  Expressed my concern regarding how he will Gigi is and confirmed that he is DNR/DNI.  She indicated that generally quality of life is considered most important for him but for now we will continue restorative cares and revisit a more palliative approach pending his response to initial treatment.     Problem list/Plan:  1. Acute encephalopathy: CT of  the head was negative.  Likely due to a combination of elevated ammonia from Depakote which has improved following lactulose administration and acute hypoxemic respiratory failure due to aspiration pneumonia.  He remains n.p.o. and below his baseline.    2. Acute hypoxemic respiratory failure due to pneumonia, suspect aspiration: Maintain n.p.o. status.  Continue IV ceftriaxone and azithromycin.  He is DNR/DNI.  I have concern regarding ongoing aspiration.  Prognosis is poor.  --Continue ceftriaxone and azithromycin  --Keep n.p.o.  --Supplemental oxygen as needed  --If he decompensates will need to contact his guardian.  I already indicated to her that if he gets significantly worse in spite of our treatments here that we should pursue a comfort approach.    3. History of seizure disorder: Chronically on Depakote for maintenance.  No recent seizure events.  Will be holding Depakote secondary to elevated ammonia levels and request formal neurology consultation to discuss other antiepileptic drugs.    4. Mentally developmental delay: Patient is a state aponte but care is to restorative up until heroic measures.  At risk for delirium.  Nonverbal at baseline but can apparently make basic needs known with some hand gestures etc.    5. History of phenylketonuria: If we do advance his diet he will certainly need to follow appropriate restrictions for phenylketonuria.    6. Chronic anemia: No evidence of acute blood loss anemia.  Hemoglobin is stable at 12.5.  Last hemoglobin back in 2016 was 10.8.  Continue to monitor for now.    7. Aspiration precautions as patient does seem to be drooling at times and not swallowing saliva: Suction oral secretions as needed.    N.p.o.  At baseline takes thickened liquids for all oral intake.     DVT Prophylaxis: Pneumatic Compression Devices  Code Status: DNR / DNI  Discharge Dispo/Date: Uncertain.  Prognosis guarded.  At minimum a prolonged stay as expected, given likely recurrent  "aspiration with severe cognitive disability and pneumonia with high oxygen requirement it is unclear if he will recover from this.  I have communicated this information to his guardian.        Interval History (Subjective):      I assumed care today  he was noted to have increased work of breathing and rising oxygen requirement.  Chest x-ray appears to show likely right lower lobe pneumonia.  He was started on IV antibiotics and has a significant oxygen requirement.    I did contact his guardian, Rosita, with an update the morning of 9/17.  Expressed my concern regarding how he will Gigi is and confirmed that he is DNR/DNI.  She indicated that generally quality of life is considered most important for him but for now we will continue restorative cares and revisit a more palliative approach pending his response to initial treatment.                  Physical Exam:      Last Vital Signs:  /55 (BP Location: Left arm)   Pulse 99   Temp 96.6  F (35.9  C) (Axillary)   Resp 18   Ht 1.626 m (5' 4\")   Wt 55 kg (121 lb 3.2 oz)   SpO2 91%   BMI 20.80 kg/m      I/O last 3 completed shifts:  In: 306 [I.V.:306]  Out: 0     General: Unresponsive male with contracted upper extremities and high flow nasal cannula oxygen in place  HEENT: NC/AT, eyes anicteric  Cardiac: RRR, S1, S2.    Pulmonary: Bilateral fairly diffuse coarse breath sounds.  No wheezing noted.  Good air movement.  Abdomen: soft, apparently non-tender, non-distended.  Extremities: No edema.  Skin: no rashes or lesions noted.  Warm and Dry.  Neuro: Unresponsive to voice and light touch, pupils equal and reactive.  No clonus or tremor.  No seizure activity noted.  Psych: Unresponsive.         Medications:      All current medications were reviewed with changes reflected in problem list.         Data:      All new lab and imaging data was reviewed.   Labs:  Recent Labs   Lab 09/18/19  0633      POTASSIUM 4.1   CHLORIDE 111*   CO2 24   ANIONGAP 8 "   *   BUN 19   CR 0.55*   GFRESTIMATED >90   GFRESTBLACK >90   CHAUNCEY 8.5     Recent Labs   Lab 09/18/19  0633   WBC 9.6   HGB 11.7*   HCT 36.4*            Imaging:   Recent Results (from the past 48 hour(s))   Head CT w/o contrast    Narrative    CT SCAN OF THE HEAD WITHOUT CONTRAST   9/17/2019 4:04 PM     HISTORY: History of developmental delay; worsening confusion. Recent  fall.    TECHNIQUE: Axial images of the head and coronal reformations without  IV contrast material. Radiation dose for this scan was reduced using  automated exposure control, adjustment of the mA and/or kV according  to patient size, or iterative reconstruction technique.    COMPARISON: 8/22/2017.    FINDINGS: There is some mild cerebral atrophy. The brain parenchyma is  otherwise normal. There is no evidence for intracranial hemorrhage,  mass effect, acute infarct, or skull fracture.      Impression    IMPRESSION: Mild cerebral atrophy. No evidence for intracranial  hemorrhage or any acute process.      PAULO JOHN MD   XR Chest 1 View    Narrative    EXAM: XR CHEST 1 VW  LOCATION: [facility]  DATE/TIME: 9/17/2019 5:42 PM    INDICATION: Altered mental status  COMPARISON: None      Impression    IMPRESSION: Suboptimal ventilatory effort with compressive and congestive change. Possible superimposed right lower lobe pneumonia. Retrocardiac consolidation and atelectasis. Heart and central vessels unremarkable. Multiple old rib fractures. Comparison   with prior would be most helpful, if none recommend follow-up.         Ted Clinton MD , MD.

## 2019-09-18 NOTE — PROVIDER NOTIFICATION
Can meds be ordered IV or other route? Patient unable to swallow/open eyes, oral pills seem unsafe at the moment    Dr Clinton paged

## 2019-09-18 NOTE — CONSULTS
Neurology Consult Note  [facility]       [2019]                                                                                       Admission Date: 2019  Hospital Day: 2  Code Status: DNR/DNI    Patient: Gigi Lackey      : 1955  MRN:  [id number]     CC:      Chief Complaint   Patient presents with     Altered Mental Status       Consult Request:  Referring Provider:  Jabari Joshua MD    Indication for Consultation: Neurology IP Consult: General (non-stroke/non-ICU); Patient to be seen: Routine - within 24 hours; 63-year-old male with a history of developmental delay and seizure disorder who presents here with acute encephalopathy.    Primary Care Provider:  Janeth Paz MD        HPI:  Gigi Lackey is a 63 year old yo male admitted for encephalopathy and hyperammonemia. He has a history of mental retardation, static, apparently due to phenylketonuria, as well as epilepsy. I have no records regarding his history of epilepsy. His Depakote has been discontinued due to hyperammonemia discovered on admission, but this is a mild elevation, common with Depakote. Chest Xray shows a probable right lower lobe pneumonia, likely due to aspiration, and he has been hypoxic since admission. He has a history of aspiration pneumonia, and was evaluated at his primary care office for presumed pneumonia about a month ago, started on amoxacillin by a physician assistant. Chest xray at that time was reportedly negative. He subsequently suffered a fall with facial trauma and was evaluated in the [facility] ER. No cervical or chest imaging was performed at that time. He recently began to become less interactive at his group home, and finally was notable able to walk on his own, and was brought to the ER for evaluation. I have been asked for recommendations regarding his Depakote dosing. Mr. Lackey is reportedly non-verbal at baseline, and cannot provide additional information  about his prior history. I have no records regarding who is treating neurologist is, or what seizure medications have been tried in the past. I have no records regarding when his last known seizure was. He is on a relatively low dose of Depakote, 250 mg in the morning and 500 mg at night. On the day of admission is Depakote level was subtherapeutic at 29 mg/L.      A complete review of symptoms was performed including vascular, infectious, cardiovascular, pulmonary, gastrointestinal, endocrinological, hematologic, dermatologic, musculoskeletal, and neurological. All were normal except as above.    CURRENT PROBLEM LIST:  Patient Active Problem List    Diagnosis Date Noted     Acute encephalopathy 09/17/2019     Priority: Medium     Profound intellectual disability      Priority: Medium     Epilepsy (H)      Priority: Medium     Osteoporosis      Priority: Medium     Anemia      Priority: Medium     Pervasive developmental disorder      Priority: Medium       PAST MEDICAL HISTORY:  ALLERGIES:   Allergies   Allergen Reactions     Actifed [Allerfrim]      Antihistamines, Chlorpheniramine-Type [Alkylamines]      Doxycycline      Morphine      Sympathomimetics      Tobacco:    History   Smoking Status     Never Smoker   Smokeless Tobacco     Not on file     Alcohol:  Social History    Substance and Sexual Activity      Alcohol use: No    MEDICATIONS:       CURRENTLY SCHEDULED MEDICATIONS     azithromycin  500 mg Intravenous Q24H     cefTRIAXone  1 g Intravenous Q24H     escitalopram  15 mg Oral Daily     lactulose 200 g rectal enema  200 g Rectal BID     mirtazapine  15 mg Oral At Bedtime     OLANZapine  15 mg Oral At Bedtime     pantoprazole  40 mg Oral BID     sodium chloride (PF)  3 mL Intracatheter Q8H          HOME MEDICATIONS  Medications Prior to Admission   Medication Sig Dispense Refill Last Dose     acetaminophen (ACETAMINOPHEN 8 HOUR) 650 MG CR tablet Take 1,300 mg by mouth 2 times daily   9/17/2019 at x1      calcium carbonate (TUMS) 500 MG chewable tablet Take 1 chew tab by mouth every morning   9/17/2019 at am     calcium carbonate (TUMS) 500 MG chewable tablet Take 2 chew tab by mouth daily in afternoon   9/16/2019 at Afternoon     carbamide peroxide (DEBROX) 6.5 % otic solution Place 4 drops into both ears Daily for 5 days of each month. Begin on the first Sunday of each month.   9/5/2019     divalproex sodium delayed-release (DEPAKOTE) 250 MG DR tablet Take 250 mg by mouth every morning   9/17/2019 at Unknown time     divalproex sodium delayed-release (DEPAKOTE) 500 MG DR tablet Take 500 mg by mouth At Bedtime    9/16/2019 at HS     docusate sodium (COLACE) 100 MG capsule Take 200 mg by mouth every morning   9/17/2019 at Unknown time     escitalopram (LEXAPRO) 5 MG tablet Take 15 mg by mouth daily    9/17/2019 at AM     folic acid (FOLVITE) 1 MG tablet Take 1 mg by mouth daily   9/17/2019 at am     mirtazapine (REMERON) 15 MG tablet Take 15 mg by mouth At Bedtime   9/16/2019 at HS     multivitamin w/minerals (MULTI-VITAMIN) tablet Take 1 tablet by mouth daily   9/17/2019 at Unknown time     naproxen (NAPROSYN) 500 MG tablet Take 500 mg by mouth 2 times daily (with meals)   9/17/2019 at am     OLANZapine (ZYPREXA) 15 MG tablet Take 15 mg by mouth At Bedtime    9/16/2019 at HS     pantoprazole (PROTONIX) 40 MG EC tablet Take 40 mg by mouth 2 times daily   9/17/2019 at x1     psyllium (METAMUCIL/KONSYL) 58.6 % powder Take 1 Tablespoonful by mouth every morning   9/17/2019 at Unknown time     senna (SENOKOT) 8.6 MG tablet Take 1 tablet by mouth daily   9/17/2019 at am     vitamin D3 (CHOLECALCIFEROL) 1000 units (25 mcg) tablet Take by mouth daily   9/17/2019 at am     MEDICAL HISTORY  Past Medical History:   Diagnosis Date     Anemia      Epilepsy (H)      Gastro-oesophageal reflux disease      Osteoporosis      Pervasive developmental disorder      Profound intellectual disability      SURGICAL HISTORY  Past Surgical  "History:   Procedure Laterality Date     ORTHOPEDIC SURGERY      ankle     FAMILY HISTORY    No family history on file.  SOCIAL HISTORY  Social History     Socioeconomic History     Marital status: Single     Spouse name: Not on file     Number of children: Not on file     Years of education: Not on file     Highest education level: Not on file   Occupational History     Not on file   Social Needs     Financial resource strain: Not on file     Food insecurity:     Worry: Not on file     Inability: Not on file     Transportation needs:     Medical: Not on file     Non-medical: Not on file   Tobacco Use     Smoking status: Never Smoker   Substance and Sexual Activity     Alcohol use: No     Drug use: No     Sexual activity: Not on file   Lifestyle     Physical activity:     Days per week: Not on file     Minutes per session: Not on file     Stress: Not on file   Relationships     Social connections:     Talks on phone: Not on file     Gets together: Not on file     Attends Yarsani service: Not on file     Active member of club or organization: Not on file     Attends meetings of clubs or organizations: Not on file     Relationship status: Not on file     Intimate partner violence:     Fear of current or ex partner: Not on file     Emotionally abused: Not on file     Physically abused: Not on file     Forced sexual activity: Not on file   Other Topics Concern     Not on file   Social History Narrative     Not on file         GENERAL EXAMINATION:    He is a middle-aged, well-developed, thin man, 5' 4\" and 121 lbs 3.2 oz. Blood pressure is 94/43. Peripheral pulses are intact at 71 and regular. He has no carotid bruits. Conjunctiva are normal. His oropharynx is without lesions or inflammation. Skin examination is unremarkable. He has no pretibial edema. He has numerous surgical scars from prior trauma. Nail examination shows no clubbing, cyanosis, or deformities. Respiratory examination is unremarkable, with rate of 18, " "unlabored. He is afebrile. Pulse ox is 96%. He is asleep, snoring, but with no unusual breath sounds.         Height: 162.6 cm (5' 4\")     Temp: 95.4  F (35.2  C)   Weight: 55 kg (121 lb 3.2 oz)   Temp src: Oral         BP: 94/43   Heart Rate: 81     Estimated body mass index is 20.8 kg/m  as calculated from the following:    Height as of this encounter: 1.626 m (5' 4\").    Weight as of this encounter: 55 kg (121 lb 3.2 oz).    Resp: 18   SpO2: 91 %   O2 Device: High Flow Nasal Cannula (HFNC)     Blood Pressure:   BP Readings from Last 3 Encounters:   19 94/43   19 119/62   17 125/69       T24 : Temp (24hrs), Av.9  F (35.5  C), Min:95.2  F (35.1  C), Max:97  F (36.1  C)         NEUROLOGICAL EXAMINATION  Mental Status:  He is asleep, difficult to arouse/    Station and Gait: He is lying in bed on his right side.     Skull and Spine: His head is atraumatic. His spine is non-tender to palpation. He has no cervical sensory level. He has no tenderness to palpation over his lumbar spine.     Cranial Nerves: Facial symmetry is intact. He has disfiguration of his pinnae, likely due to prior trauma.    Motor: Muscle bulk is reduced generally. Plantar reflexes are flexor on the left, extensor on the right.    Sensory: He withdraws to plantar scratch bilaterally.     Coordination: He has no resting tremor.      .  LABORATORY RESULTS      SMA-7:  Recent Labs   Lab Test 19  0633 19  1503 16  2110    143 143   POTASSIUM 4.1 4.3 4.1   CHLORIDE 111* 108 110*   CO2 24 27 28   * 119* 84   BUN  18   CR 0.55* 0.58* 0.68   CHAUNCEY 8.5 9.4 9.0     CMP:  Recent Labs   Lab 19  0633 19  1503   PROTTOTAL 6.3* 7.3   ALBUMIN 3.0* 3.7   ALKPHOS 68 86   AST 18 19   ALT 12 14   BILITOTAL 0.4 0.3     CBC:    Recent Labs   Lab 19  0633 19  1503   WBC 9.6 6.3   RBC 3.64* 3.85*   HGB 11.7* 12.5*   HCT 36.4* 38.8*    101*    294     U/A:    Recent Labs   Lab " Test 09/17/19  1702   COLOR Yellow   APPEARANCE Clear   URINEGLC Negative   URINEBILI Negative   URINEKETONE 10*   SG 1.033   UBLD Negative   URINEPH 6.0   PROTEIN 10*   NITRITE Negative   LEUKEST Negative   RBCU 1   WBCU 1     Ammonia:    Recent Labs   Lab Test 09/18/19  0633 09/17/19  1503   RAMON 50 76*     Vitamin B12: No lab results found.  Homocysteine: No results found for: HOMOCYSTEINE  Vitamin D: No results for input(s): VITDT in the last 168 hours.    Recent Labs   Lab 09/17/19  1503   TROPI <0.015     Lab Results   Component Value Date    TROPI <0.015 09/17/2019        ABG:   Recent Labs   Lab 09/17/19  1503   O2PER ROOM AIR      Depakote level:     Recent Labs   Lab Test 09/17/19  1503   ROXANA 29*     IMAGING RESULTS   Xr Chest 1 View    Result Date: 9/17/2019  EXAM: XR CHEST 1 VW LOCATION: [facility] DATE/TIME: 9/17/2019 5:42 PM INDICATION: Altered mental status COMPARISON: None     IMPRESSION: Suboptimal ventilatory effort with compressive and congestive change. Possible superimposed right lower lobe pneumonia. Retrocardiac consolidation and atelectasis. Heart and central vessels unremarkable. Multiple old rib fractures. Comparison  with prior would be most helpful, if none recommend follow-up.    Head Ct W/o Contrast    Result Date: 9/17/2019  CT SCAN OF THE HEAD WITHOUT CONTRAST   9/17/2019 4:04 PM HISTORY: History of developmental delay; worsening confusion. Recent fall. TECHNIQUE: Axial images of the head and coronal reformations without IV contrast material. Radiation dose for this scan was reduced using automated exposure control, adjustment of the mA and/or kV according to patient size, or iterative reconstruction technique. COMPARISON: 8/22/2017. FINDINGS: There is some mild cerebral atrophy. The brain parenchyma is otherwise normal. There is no evidence for intracranial hemorrhage, mass effect, acute infarct, or skull fracture.     IMPRESSION: Mild cerebral atrophy. No evidence for  intracranial hemorrhage or any acute process. PAULO JOHN MD      Recent Results (from the past 24 hour(s))   Head CT w/o contrast    Narrative    CT SCAN OF THE HEAD WITHOUT CONTRAST   9/17/2019 4:04 PM     HISTORY: History of developmental delay; worsening confusion. Recent  fall.    TECHNIQUE: Axial images of the head and coronal reformations without  IV contrast material. Radiation dose for this scan was reduced using  automated exposure control, adjustment of the mA and/or kV according  to patient size, or iterative reconstruction technique.    COMPARISON: 8/22/2017.    FINDINGS: There is some mild cerebral atrophy. The brain parenchyma is  otherwise normal. There is no evidence for intracranial hemorrhage,  mass effect, acute infarct, or skull fracture.      Impression    IMPRESSION: Mild cerebral atrophy. No evidence for intracranial  hemorrhage or any acute process.      PAULO JOHN MD   XR Chest 1 View    Narrative    EXAM: XR CHEST 1 VW  LOCATION: [facility]  DATE/TIME: 9/17/2019 5:42 PM    INDICATION: Altered mental status  COMPARISON: None      Impression    IMPRESSION: Suboptimal ventilatory effort with compressive and congestive change. Possible superimposed right lower lobe pneumonia. Retrocardiac consolidation and atelectasis. Heart and central vessels unremarkable. Multiple old rib fractures. Comparison   with prior would be most helpful, if none recommend follow-up.     _____________________________________________________________________________    EKG:        ASSESSMENT     1. Encephalopathy in the setting of hypoxia and recurrent pneumonia, likely due to recurrent aspiration.  2. Apparent history of epilepsy. I have no records to review.  3. Hyperammonemia, mild, likely due to Depakote.  4. Right extensor plantar response, likely due to cervical pathology/cervical stenosis, in the setting of multiple falls and head trauma, but no identifiable C-spine imaging since 2016. He  probably has cervical cord impingement from one of his recent falls.    RECOMMENDATIONS   1. Without prior history, recommendations are challenging. I do no recommend discontinuing seizure medication completely at this time. Assuming he has had seizures that justified starting Depakote, it would be reasonable to continue a seizure medication. He has tolerated the Depakote up to now, though I have no record of prior ammonia testing. IN any case, his admission ammonia level was not markedly elevated. I recommend restarting the Depakote, but reducing his present dose from 250 qam and 500 at bedtime, to 250 mg bid.   2. Please try to obtain further prior history regarding his epilepsy evaluation.  3. When he is more alert I recommend he undergo xray imagine of his C-spine to clearly his neck given his recent fall history, with recent history of face trauma due to fall, likely causing a cervical spine hyperextension injury, and possibly causing right cervical cord compression injury, accounting for his right extensor plantar response and recent gait instability. He may also require MR imaging of his C-spine, if he can tolerate the MRI when more alert.    Please call if there are further questions.        Pk Pizano M.D., Ph.D.    [facility]

## 2019-09-18 NOTE — PLAN OF CARE
Unable to assess orientation - pt non-verbal at baseline - per group home staff is able to communicate with hands at times, mechanical lift Ax2, Q2H repositioning, NPO - previous diet orders from group home pureed with nectar thick liquids, VS - O2 saturations inlow 70s upon routine start of shift vitals - see progress note for detail - pt on high flow O2 throughout shift to maintain sats above 90%, L PIV infusing at 100ml/hr, L eye injury - self inflicted per  staff, PRN haldol and ativan available for agitation - combative and agitated with all cares, bedside attendant present this shift, seizure precautions maintained, condom cath placed to obtain urine specimen, incontinent of bowel and bladder x2, neurology consulted, discharge plan not yet determined, continue with plan of care.

## 2019-09-19 ENCOUNTER — APPOINTMENT (OUTPATIENT)
Dept: GENERAL RADIOLOGY | Facility: CLINIC | Age: 64
DRG: 177 | End: 2019-09-19
Attending: INTERNAL MEDICINE
Payer: MEDICARE

## 2019-09-19 ENCOUNTER — APPOINTMENT (OUTPATIENT)
Dept: SPEECH THERAPY | Facility: CLINIC | Age: 64
DRG: 177 | End: 2019-09-19
Attending: INTERNAL MEDICINE
Payer: MEDICARE

## 2019-09-19 PROCEDURE — 25000125 ZZHC RX 250: Performed by: INTERNAL MEDICINE

## 2019-09-19 PROCEDURE — 99233 SBSQ HOSP IP/OBS HIGH 50: CPT | Performed by: INTERNAL MEDICINE

## 2019-09-19 PROCEDURE — 25000128 H RX IP 250 OP 636: Performed by: INTERNAL MEDICINE

## 2019-09-19 PROCEDURE — 92610 EVALUATE SWALLOWING FUNCTION: CPT | Mod: GN

## 2019-09-19 PROCEDURE — 12000000 ZZH R&B MED SURG/OB

## 2019-09-19 PROCEDURE — C9113 INJ PANTOPRAZOLE SODIUM, VIA: HCPCS | Performed by: INTERNAL MEDICINE

## 2019-09-19 PROCEDURE — 25000132 ZZH RX MED GY IP 250 OP 250 PS 637: Mod: GY | Performed by: INTERNAL MEDICINE

## 2019-09-19 PROCEDURE — 72040 X-RAY EXAM NECK SPINE 2-3 VW: CPT

## 2019-09-19 PROCEDURE — 40000275 ZZH STATISTIC RCP TIME EA 10 MIN

## 2019-09-19 PROCEDURE — 25800030 ZZH RX IP 258 OP 636: Performed by: INTERNAL MEDICINE

## 2019-09-19 PROCEDURE — 40000983 ZZH STATISTIC HFNC ADULT NON-CPAP

## 2019-09-19 RX ORDER — DIVALPROEX SODIUM 125 MG/1
250 TABLET, DELAYED RELEASE ORAL EVERY 12 HOURS SCHEDULED
Status: DISCONTINUED | OUTPATIENT
Start: 2019-09-19 | End: 2019-09-22 | Stop reason: HOSPADM

## 2019-09-19 RX ORDER — PANTOPRAZOLE SODIUM 40 MG/1
40 TABLET, DELAYED RELEASE ORAL
Status: DISCONTINUED | OUTPATIENT
Start: 2019-09-19 | End: 2019-09-22 | Stop reason: HOSPADM

## 2019-09-19 RX ORDER — AZITHROMYCIN 250 MG/1
250 TABLET, FILM COATED ORAL DAILY
Status: DISCONTINUED | OUTPATIENT
Start: 2019-09-20 | End: 2019-09-21

## 2019-09-19 RX ORDER — LACTULOSE 10 G/15ML
10 SOLUTION ORAL 2 TIMES DAILY
Status: DISCONTINUED | OUTPATIENT
Start: 2019-09-19 | End: 2019-09-22 | Stop reason: HOSPADM

## 2019-09-19 RX ADMIN — POTASSIUM CHLORIDE, DEXTROSE MONOHYDRATE AND SODIUM CHLORIDE: 150; 5; 450 INJECTION, SOLUTION INTRAVENOUS at 00:22

## 2019-09-19 RX ADMIN — LACTULOSE 200 G: 10 SOLUTION ORAL; RECTAL at 11:19

## 2019-09-19 RX ADMIN — LORAZEPAM 0.5 MG: 2 INJECTION INTRAMUSCULAR; INTRAVENOUS at 10:07

## 2019-09-19 RX ADMIN — PANTOPRAZOLE SODIUM 40 MG: 40 INJECTION, POWDER, FOR SOLUTION INTRAVENOUS at 09:10

## 2019-09-19 RX ADMIN — VALPROATE SODIUM 250 MG: 100 INJECTION, SOLUTION INTRAVENOUS at 08:02

## 2019-09-19 RX ADMIN — DIVALPROEX SODIUM 250 MG: 125 TABLET, DELAYED RELEASE ORAL at 21:31

## 2019-09-19 RX ADMIN — AMOXICILLIN AND CLAVULANATE POTASSIUM 1 TABLET: 875; 125 TABLET, FILM COATED ORAL at 21:31

## 2019-09-19 RX ADMIN — LACTULOSE 10 G: 20 SOLUTION ORAL at 21:31

## 2019-09-19 RX ADMIN — CEFTRIAXONE SODIUM 1 G: 1 INJECTION, POWDER, FOR SOLUTION INTRAMUSCULAR; INTRAVENOUS at 03:17

## 2019-09-19 RX ADMIN — OLANZAPINE 15 MG: 2.5 TABLET, FILM COATED ORAL at 21:31

## 2019-09-19 RX ADMIN — PANTOPRAZOLE SODIUM 40 MG: 40 TABLET, DELAYED RELEASE ORAL at 21:31

## 2019-09-19 RX ADMIN — HALOPERIDOL LACTATE 2 MG: 5 INJECTION INTRAMUSCULAR at 05:34

## 2019-09-19 RX ADMIN — AZITHROMYCIN MONOHYDRATE 500 MG: 500 INJECTION, POWDER, LYOPHILIZED, FOR SOLUTION INTRAVENOUS at 04:21

## 2019-09-19 ASSESSMENT — ACTIVITIES OF DAILY LIVING (ADL)
ADLS_ACUITY_SCORE: 38

## 2019-09-19 NOTE — PROGRESS NOTES
"Clinical Swallow Evaluation:      09/19/19 1158   General Information   Onset Date 09/17/19   Start of Care Date 09/19/19   Referring Physician Dr. Clinton   Swallowing Evaluation Bedside swallow evaluation   Behaviorial Observations Alert;Distractible;Impulsive   Mode of current nutrition NPO   Respiratory Status Room air   Comments Pt admitted to [facility] from  with acute encephalopathy, acute hypoxemic respiratory failure d/t PNA likely secondary to aspiration. He has a hx of seizure disorder, mental developmental disorder, GERD and nonverbal at baseline. Pt initially on HFNC, however  unable to keep on/kept pulling off. On RA currently. SLP for swallow evaluation, NPO.     Baseline chronic dysphagia/aspiration risk. Pt with a video through [employer] 8/6/18, SLP report states \"No aspiration was observed. However, Gigi is at very high risk for aspiration of all po consistencies. Laryngeal penetration was directly observed with puree consistency. Material was not cleared from the upper airway, placing him at high risk for delayed aspiration after the swallow. Nectar and honey thick liquids were also trialed by kevin. Although no direct penetration of material was observed during the study (secondary to patient's inability to remain still), he eventually exhibited laryngeal penetration of significant pharyngeal stasis. This stasis was likely a mix of all three consistencies mixed together (puree, honey and nectar thick liquids), as it was too difficult to decipher which consistencies were being penetrated. Patient began coughing fairly consistently after the exam was completed, which is usually what happens at the group home per staff member, Rob. Gigi usually coughs more after meals, suggesting delayed aspiration after the swallow.\" They recommended NPO with G-TF consideration, however given pt's cognition/impulsivity, concern for pulling out TF. \"best fit diet\" was then pursued: DD1, pudding " thick liquid via tsp.     Clinical Swallow Evaluation   Oral Musculature unable to assess due to poor participation/comprehension   Additional Documentation Yes   Clinical Swallow Eval: Pudding Thick Liquid Texture Trial   Mode of Presentation, Pudding spoon;fed by clinician   Volume of Pudding Presented <1 oz 1/4 tsps   Oral Phase, Pudding Poor AP movement;Premature pharyngeal entry   Pharyngeal Phase, Pudding impaired;reduction in laryngeal movement;repeated swallows;coughing/choking   Diagnostic Statement Pt with up to 10 swallows for every bite/sip which is consistent with pharyngeal residuals. Delayed cough with ~25% of PO.    Clinical Swallow Eval: Puree Solid Texture Trial   Mode of Presentation, Puree spoon;fed by clinician   Volume of Puree Presented <1 oz 1/4 tsps   Oral Phase, Puree Poor AP movement;Premature pharyngeal entry   Pharyngeal Phase, Puree impaired;reduction in laryngeal movement;repeated swallows;coughing/choking   Diagnostic Statement Pt with up to 10 swallows for every bite/sip which is consistent with pharyngeal residuals. Delayed cough with ~25% of PO.    Swallow Eval: Clinical Impressions   Skilled Criteria for Therapy Intervention Current level of function same as previous level of function  (no significant expected improvement in functional status)   Functional Assessment Scale (FAS) 1   Dysphagia Outcome Severity Scale (SELAM) Level 1 - SELAM   Treatment Diagnosis oropharyngeal dysphagia   Diet texture recommendations NPO;Dysphagia diet level 1;Pudding thick liquids  (via tsp (see note))   Recommended Feeding/Eating Techniques alternate between small bites and sips of food/liquid;check mouth frequently for oral residue/pocketing;maintain upright posture during/after eating for 30 mins;small sips/bites  (tsp only, allow time for multiple swallows per each bite/sip)   Anticipated Discharge Disposition   (return to )   Clinical Impression Comments Clinical swallow evaluation completed.  "Pt alert but unable to follow any direction, nonverbal at baseline. On Room air currently. Baseline chronic dysphagia/aspiration risk with video swallow study completed 8/2018 with prior decision making re: NPO with G-tube placement vs \"best fit diet\" of DD1/pudding thick via tsp - ultimate decision was to pursue PO despite risks for aspiration given anticipation of poor TF tolerance d/t cognition, impulsivity and pulling out TF. Pt assessed with oral cares, <1 oz total of puree and pudding thick liquids via tsp. Pt orally receptive to all trials (but would swat hand at writer when not ready for next bite). Adequate lip closure around tsp though significant reduced lingual control and impaired anterior to posterior transit. Reduced and delayed hyolaryngeal ROM. Pt with up to 10 swallows for every bite/sip which is consistent with pharyngeal residuals. Delayed cough with ~25% of PO. Discussion held with MD. Safest possible would be NPO with seeking alternative means of nutrition however question tolerance of TF. MD in agreement to continue baseline \"best fit diet\" of DD1 solids, pudding thick liquids via tsp and 1:1 supervision  with known risks for aspiration.    Total Evaluation Time   Total Evaluation Time (Minutes) 24     "

## 2019-09-19 NOTE — PLAN OF CARE
Haldol given for agitations and restlessness, sitter at bedside. NPO, IV fluid 75cc /h. Continue IV Rocephin and Zithromax. Continue to monitor.

## 2019-09-19 NOTE — PROGRESS NOTES
[facility]  Hospitalist Progress Note  Ted Clinton MD 09/19/19    Reason for Stay (Diagnosis): encephalopathy, aspiration pneumonia         Assessment and Plan:      Summary of Stay: Gigi Lackey is a 63-year-old male who is a state aponte w/ guardian with a past medical history significant for phenylketonuria, mental development disorder, epilepsy, GERD, and chronic anemia who is nonverbal at baseline who presented here for altered mental status on 9/17/19.  Notably, patient was seen here 3 weeks ago after an unwitnessed fall that resulted in a nasal injury and facial laceration which had been fixed.  Since then, they noted that he has been a bit unsteady but overall was able to interact and ambulate with assist.  However, over the 24 to 48 hours prior to admission his symptoms have remarkably changed and was unable to ambulate and required a higher level assistance to transfer.  They also noted that he has been less interactive and had periods where he was quite agitated.  At baseline, patient apparently is able to interact with staff and make his needs known by using his hands but is nonverbal.     Gigi was found to have elevated ammonia level of 76 prompting us to hold his depakote and start lactulose enemas.  Furthermore, the night of admission he was noted to have increased work of breathing and rising oxygen requirement.  Chest x-ray appears to show likely right lower lobe pneumonia.  He was started on IV antibiotics and has a significant oxygen requirement.    I did contact his guardian, Rosita, with an update the morning of 9/17.  Expressed my concern regarding how ill Gigi is and confirmed that he is DNR/DNI.  She indicated that generally quality of life is considered most important for him but for now we will continue restorative cares and revisit a more palliative approach pending his response to initial treatment.     Problem list/Plan:  1. Acute encephalopathy: CT of the  head was negative.  Likely due to a combination of elevated ammonia from Depakote which has improved following lactulose administration and acute hypoxemic respiratory failure due to aspiration pneumonia.    --Encephalopathy improving, now awake but somewhat agitated    2. Acute hypoxemic respiratory failure due to pneumonia, suspect aspiration: Maintain n.p.o. status.  Continue IV ceftriaxone and azithromycin.  He is DNR/DNI.  I have concern regarding ongoing aspiration.  Prognosis is poor.  --Continue ceftriaxone and azithromycin  --Keep n.p.o. pending speech pathology input  --Supplemental oxygen as needed  --If he decompensates will need to contact his guardian.  I already indicated to her that if he gets significantly worse in spite of our treatments here that we should pursue a comfort approach.    3.   History of seizure disorder: Chronically on Depakote for maintenance.  No recent seizure events.  Initially holding Depakote secondary to elevated ammonia levels.  Neurology recommending dose reduction.  --IV valproate for now at 250 mg twice daily, changed to oral Depakote 250 mg twice daily once taking oral    4.   Mentally developmental delay: Patient is a state aponte but care is to restorative up until heroic measures.  At risk for delirium.  Nonverbal at baseline but can apparently make basic needs known with some hand gestures etc.    5.   History of phenylketonuria: If we do advance his diet he will certainly need to follow appropriate restrictions for phenylketonuria.    6.   Chronic anemia: No evidence of acute blood loss anemia.  Hemoglobin is stable.  Last hemoglobin back in 2016 was 10.8.  Continue to monitor for now.    7.   Aspiration precautions as patient does seem to be drooling at times and not swallowing saliva: Suction oral secretions as needed.    N.p.o.  At baseline takes thickened liquids for all oral intake.  --slp following  --consider trial of NJ tube if unable to advance diet but I'm  "not sure he would tolerate this without significant sedation which may outweigh benefits     DVT Prophylaxis: Pneumatic Compression Devices  Code Status: DNR / DNI  Discharge Dispo/Date: Uncertain.  Prognosis guarded.  At minimum a prolonged stay as expected, given likely recurrent aspiration with severe cognitive disability and pneumonia with high oxygen requirement it is unclear if he will recover from this.  I have communicated this information to his guardian.        Interval History (Subjective):      Intermittent agitation, actually more awake today and opening eyes and responding  O2 requirement trending down  On review of notes from yesterday Neuro recommended xr c-spine due to relatively remote fall history.  Will order now.           Update does not appear to have an obvious fracture or significant acute finding on his x-ray.  Started IV valproate dose of oral Depakote  Will revisit swallow ability with speech pathology today         Physical Exam:      Last Vital Signs:  /53 (BP Location: Left arm)   Pulse 113   Temp 99.6  F (37.6  C) (Axillary)   Resp 20   Ht 1.626 m (5' 4\")   Wt 55 kg (121 lb 3.2 oz)   SpO2 92%   BMI 20.80 kg/m      I/O last 3 completed shifts:  In: 1434 [I.V.:1434]  Out: -     General: Somewhat agitated and impulsive male with contracted upper extremities and nasal cannula in place.  HEENT: NC/AT, eyes anicteric  Cardiac: RRR, S1, S2.    Pulmonary: Bilateral fairly diffuse coarse breath sounds.  No wheezing noted.  Good air movement.  Abdomen: soft, apparently non-tender, non-distended.  Extremities: No edema.  Skin: no rashes or lesions noted.  Warm and Dry.  Neuro: Eyes open, fidgeting in bed.  Swatting when providers could close.  No clonus, tremor or seizure activity  Psych: Agitated         Medications:      All current medications were reviewed with changes reflected in problem list.         Data:      All new lab and imaging data was reviewed.   Labs:  Recent Labs "   Lab 09/18/19  0633      POTASSIUM 4.1   CHLORIDE 111*   CO2 24   ANIONGAP 8   *   BUN 19   CR 0.55*   GFRESTIMATED >90   GFRESTBLACK >90   CHAUNCEY 8.5     Recent Labs   Lab 09/18/19  0633   WBC 9.6   HGB 11.7*   HCT 36.4*            Imaging:   Recent Results (from the past 48 hour(s))   Head CT w/o contrast    Narrative    CT SCAN OF THE HEAD WITHOUT CONTRAST   9/17/2019 4:04 PM     HISTORY: History of developmental delay; worsening confusion. Recent  fall.    TECHNIQUE: Axial images of the head and coronal reformations without  IV contrast material. Radiation dose for this scan was reduced using  automated exposure control, adjustment of the mA and/or kV according  to patient size, or iterative reconstruction technique.    COMPARISON: 8/22/2017.    FINDINGS: There is some mild cerebral atrophy. The brain parenchyma is  otherwise normal. There is no evidence for intracranial hemorrhage,  mass effect, acute infarct, or skull fracture.      Impression    IMPRESSION: Mild cerebral atrophy. No evidence for intracranial  hemorrhage or any acute process.      PAULO JOHN MD   XR Chest 1 View    Narrative    EXAM: XR CHEST 1 VW  LOCATION: [facility]  DATE/TIME: 9/17/2019 5:42 PM    INDICATION: Altered mental status  COMPARISON: None      Impression    IMPRESSION: Suboptimal ventilatory effort with compressive and congestive change. Possible superimposed right lower lobe pneumonia. Retrocardiac consolidation and atelectasis. Heart and central vessels unremarkable. Multiple old rib fractures. Comparison   with prior would be most helpful, if none recommend follow-up.         Ted Clinton MD , MD.

## 2019-09-19 NOTE — PROGRESS NOTES
Pt maintained throughout the night on HFNC on 20L 30%.    Pt's BS were diminished with sat's ranging from 92 - 96%.    Will continue to follow and assess.    RT Deepti on 9/19/2019 at 7:24 AM

## 2019-09-19 NOTE — SIGNIFICANT EVENT
Bedside sitter RN stopped IVF at the end of her shift at about 1505 and stated that the patient's arm was swollen. This was passed on to the support staff nurse, Whitley HUDSON. She assessed pt's arm and told this writer that arm was swollen and no blood return was noted on IV. This writer assessed patient's arm with oncoming evening nurse Rosaline at about 1540. Arm swollen, not red. This writer paged Dr. Clinton who stated to also speak with pharmacy. This writer than spoke with pharamacy (Korey) who stated the patient did not need an antidote at this time but to continue to monitor for change of color in the arm or further symptoms in case he needs ones later. All messages from Dr. Clinton & Korey- pharmacist was passed to the evening nurse- Rosaline.

## 2019-09-19 NOTE — PROVIDER NOTIFICATION
"Paged Dr. Clinton \"Patients IV infiltrated while IVF w/ 20 meq of K+ running. Arm is swollen. Not red. Any other interventions needed or further assessments? Thanks!\"      Dr. Clinton added orders and stated to talk w/antwon. This writer spoke with pharmacy who stated that the pt did not need an antidote at this time but to monitor the site closely for any color changes or further symptoms. This writer then relayed antwon's message to Rosaline the oncoming khang nurse.   "

## 2019-09-19 NOTE — PLAN OF CARE
Discharge Planner SLP   Patient plan for discharge: nonverbal, unable to state  Current status: Clinical swallow evaluation completed. Pt alert but unable to follow any directions, nonverbal at baseline. On Room air currently. Baseline chronic dysphagia/aspiration risk with video swallow study completed 8/2018. Decision making after video swallow study re: NPO with G-tube placement (safest to reduced aspiration) vs  best fit diet  of DD1/pudding thick via tsp - ultimate decision was to pursue PO despite risks for aspiration given anticipation of poor TF tolerance d/t cognition, impulsivity and likelihood of pulling out TF.     Pt assessed with oral cares, <1 oz total of puree and pudding thick liquids via tsp. Pt with severe oropharyngeal dysphagia. Pt orally receptive to all trials (but would swat hand at writer when not ready for next bite). Adequate lip closure around tsp though significant reduced lingual control and impaired anterior to posterior transit. Reduced and delayed hyolaryngeal ROM. Pt with up to 10 swallows for every bite/sip which is consistent with pharyngeal residuals. Delayed cough with ~25% of PO. Discussion held with MD. Safest possible would be NPO with seeking alternative means of nutrition however question how pt would tolerate TF/ likelihood of pulling out TF. Phone conversation held with MD re: evaluation/high aspiration risk and discussion re: NPO vs PO. He is in agreement to continue baseline  best fit diet  of DD1 solids, pudding thick liquids via tsp and 1:1 supervision with known risks for aspiration. Allow time for pt to complete extra swallows after every single bite/sip. Try to sit pt as upright as possible.     Barriers to return to prior living situation: medical needs  Recommendations for discharge: return to   Rationale for recommendations: Pt swallow at baseline, chronic dysphagia with high aspiration risk. No anticipated improvement in swallow function given baseline  cognitive deficits, no skilled intervention able to be provided. Will sign off. May want to consider discussion re: goals of care.        Entered by: Joanna Lockett 09/19/2019 1:07 PM

## 2019-09-19 NOTE — PLAN OF CARE
VS stable- off high flow as patient had been pulling off mask frequently. Sats in 90's. ADONIS orientation, patient is nonverbal. Has been agitated this shift- iv ativan given once. Hand george and no-no in place to protect lines as patient has been pulling at lines frequently.  He does rest calmly between cares.   Diet:NPO.   Ambulates:lift- turn as able, pt frequently moving himself in bed. Pt did not cooperate with placing pillows underneath him, so turned and weight shift assistance provided.   IV meds: IVF running.   Pain:see flowsheets   Abnormal labs: xray done today.   Abnormal assessments: ADONIS skin fully this shift. Patient has been very combative whenever staff tries to do cares.    Intake & output: incontinent of urine. One bowel movement this shift post enema.   Discharge plan: TBD. Will continue to monitor and review plan of care.

## 2019-09-19 NOTE — PLAN OF CARE
Pt non-verbal at baseline; ADONIS orientation. VSS on High flow 02; 30%. Assist of 2 w/Mechanical lift Ax2. NPO. Siezure precautions maintained. IV Valroate. Sitter at bedside. L PIV infusing at 75ml/hr. PRN haldol given X1 for agitation, constant screaming out and being combative. Incontinent B&B. Lactulose enema; ammonia 50. LS diminished. IV Rocephin and Zithromax. Neurology consulted. Discharge TBD.Continue to monitor.

## 2019-09-20 PROCEDURE — 25000132 ZZH RX MED GY IP 250 OP 250 PS 637: Mod: GY | Performed by: INTERNAL MEDICINE

## 2019-09-20 PROCEDURE — 99207 ZZC CDG-MDM COMPONENT: MEETS LOW - DOWN CODED: CPT | Performed by: INTERNAL MEDICINE

## 2019-09-20 PROCEDURE — 99232 SBSQ HOSP IP/OBS MODERATE 35: CPT | Performed by: INTERNAL MEDICINE

## 2019-09-20 PROCEDURE — 12000000 ZZH R&B MED SURG/OB

## 2019-09-20 RX ORDER — OLANZAPINE 5 MG/1
5 TABLET, ORALLY DISINTEGRATING ORAL 2 TIMES DAILY PRN
Status: DISCONTINUED | OUTPATIENT
Start: 2019-09-20 | End: 2019-09-22 | Stop reason: HOSPADM

## 2019-09-20 RX ADMIN — AZITHROMYCIN MONOHYDRATE 250 MG: 250 TABLET ORAL at 08:51

## 2019-09-20 RX ADMIN — LACTULOSE 10 G: 20 SOLUTION ORAL at 20:10

## 2019-09-20 RX ADMIN — LACTULOSE 10 G: 20 SOLUTION ORAL at 08:52

## 2019-09-20 RX ADMIN — ACETAMINOPHEN 650 MG: 325 TABLET, FILM COATED ORAL at 08:52

## 2019-09-20 RX ADMIN — AMOXICILLIN AND CLAVULANATE POTASSIUM 1 TABLET: 875; 125 TABLET, FILM COATED ORAL at 08:52

## 2019-09-20 RX ADMIN — PANTOPRAZOLE SODIUM 40 MG: 40 TABLET, DELAYED RELEASE ORAL at 08:52

## 2019-09-20 RX ADMIN — OLANZAPINE 15 MG: 2.5 TABLET, FILM COATED ORAL at 23:13

## 2019-09-20 RX ADMIN — DIVALPROEX SODIUM 250 MG: 125 TABLET, DELAYED RELEASE ORAL at 08:52

## 2019-09-20 RX ADMIN — PANTOPRAZOLE SODIUM 40 MG: 40 TABLET, DELAYED RELEASE ORAL at 16:10

## 2019-09-20 RX ADMIN — DIVALPROEX SODIUM 250 MG: 125 TABLET, DELAYED RELEASE ORAL at 20:10

## 2019-09-20 RX ADMIN — AMOXICILLIN AND CLAVULANATE POTASSIUM 1 TABLET: 875; 125 TABLET, FILM COATED ORAL at 20:10

## 2019-09-20 ASSESSMENT — ACTIVITIES OF DAILY LIVING (ADL)
ADLS_ACUITY_SCORE: 38

## 2019-09-20 NOTE — PROGRESS NOTES
[facility]  Hospitalist Progress Note  Ted Clinton MD 09/20/19    Reason for Stay (Diagnosis): encephalopathy, aspiration pneumonia         Assessment and Plan:      Summary of Stay: Gigi Lackey is a 63-year-old male who is a state aponte w/ guardian with a past medical history significant for phenylketonuria, mental development disorder, epilepsy, GERD, and chronic anemia who is nonverbal at baseline who presented here for altered mental status on 9/17/19.  Notably, patient was seen here 3 weeks ago after an unwitnessed fall that resulted in a nasal injury and facial laceration which had been fixed.  Since then, they noted that he has been a bit unsteady but overall was able to interact and ambulate with assist.  However, over the 24 to 48 hours prior to admission his symptoms have remarkably changed and was unable to ambulate and required a higher level assistance to transfer.  They also noted that he has been less interactive and had periods where he was quite agitated.  At baseline, patient apparently is able to interact with staff and make his needs known by using his hands but is nonverbal.     Gigi was found to have elevated ammonia level of 76 prompting us to hold his depakote and start lactulose enemas.  Furthermore, the night of admission he was noted to have increased work of breathing and rising oxygen requirement.  Chest x-ray appears to show likely right lower lobe pneumonia.  He was started on IV antibiotics and has a significant oxygen requirement.    I did contact his guardian, Rosita, with an update the morning of 9/17.  Expressed my concern regarding how ill Gigi was and confirmed that he is DNR/DNI.  She indicated that generally quality of life is considered most important for him but for now we will continue restorative cares for now.  Overall over the past 3 days he has become much more alert and as a result, I suspect, combative with cares.  He pulled out his IV  and treatments have been changed to oral options.  Social work will now be consulted as we will have to see if he is nearing his baseline/ready to return to his prior living facility.     Problem list/Plan:  1. Acute encephalopathy: Resolving.  I suspect he is not far from his baseline at this point.  CT of the head was negative.  Likely due to a combination of elevated ammonia from Depakote which has improved following lactulose administration and acute hypoxemic respiratory failure due to aspiration pneumonia.    --Encephalopathy improving, now awake but somewhat agitated  --Continue to treat pneumonia, remains on a reduced dose of Depakote    2. Acute hypoxemic respiratory failure due to pneumonia, suspect aspiration: As per SLP now providing pudding thickened liquids.  Changed from IV ceftriaxone and azithromycin on 9/19.  He is DNR/DNI.  I have concern regarding ongoing aspiration.  Prognosis is poor overall but his guardian is aware of this..  Was actually requiring up to 70% FiO2 from high flow system on 9/18.  Now weaning down rapidly.  --Augmentin, will hold off on further azithromycin given the high likelihood that this is aspiration  --SLP following regarding diet advancement.  At baseline is on thickened liquids.  --Supplemental oxygen as needed  --If he decompensates will need to contact his guardian.  I already indicated to her that if he gets significantly worse in spite of our treatments here that we should pursue a comfort approach but I get the sense that he is rallying and is weaning down on oxygen.    3.   History of seizure disorder: Chronically on Depakote for maintenance.  No recent seizure events.  Initially held Depakote secondary to elevated ammonia levels.  Neurology recommending dose reduction.  --changed to oral Depakote 250 mg twice daily which is a dose reduction as recommended by neurology.    4.   Mentally developmental delay: Patient is a state aponte but care is to restorative up  "until heroic measures.  Nonverbal at baseline but can apparently make basic needs known with some hand gestures etc.    5.   History of phenylketonuria: If we do advance his diet he will certainly need to follow appropriate restrictions for phenylketonuria.    6.   Chronic anemia: No evidence of acute blood loss anemia.  Hemoglobin is stable.  Last hemoglobin back in 2016 was 10.8.  Continue to monitor for now.    7.   Aspiration precautions as patient does seem to be drooling at times and not swallowing saliva: Suction oral secretions as needed.     At baseline takes thickened liquids for all oral intake.  --slp following  --considered trial of NJ tube if unable to advance diet but he would not tolerate this without significant sedation which would outweigh benefits     DVT Prophylaxis: Pneumatic Compression Devices  Code Status: DNR / DNI  Discharge Dispo/Date: Uncertain.  ?1-2 days.          Interval History (Subjective):      Advanced to thickened liquids  O2 requirement trending down  Much more awake but also developed agitation overnight.  Changed meds to PO  Restarted home zyprexa, added prn dose as well.  Changed to PO abx  SW consult         Physical Exam:      Last Vital Signs:  /69   Pulse 81   Temp 96.6  F (35.9  C) (Axillary)   Resp 16   Ht 1.626 m (5' 4\")   Wt 55 kg (121 lb 3.2 oz)   SpO2 97%   BMI 20.80 kg/m      I/O last 3 completed shifts:  In: 895 [P.O.:100; I.V.:795]  Out: -     General: Somewhat agitated and impulsive male with contracted upper extremities and nasal cannula in place.  HEENT: NC/AT, eyes anicteric  Cardiac: RRR, S1, S2.    Pulmonary: Bilateral fairly diffuse coarse breath sounds.  No wheezing noted.  Good air movement.  Abdomen: soft, apparently non-tender, non-distended.  Extremities: No edema.  Skin: no rashes or lesions noted.  Warm and Dry.  Neuro: Eyes open, fidgeting in bed.  Swatting when providers could close.  No clonus, tremor or seizure activity  Psych: " Agitated         Medications:      All current medications were reviewed with changes reflected in problem list.         Data:      All new lab and imaging data was reviewed.   Labs:  Recent Labs   Lab 09/18/19  0633      POTASSIUM 4.1   CHLORIDE 111*   CO2 24   ANIONGAP 8   *   BUN 19   CR 0.55*   GFRESTIMATED >90   GFRESTBLACK >90   CHAUNCEY 8.5     Recent Labs   Lab 09/18/19  0633   WBC 9.6   HGB 11.7*   HCT 36.4*            Imaging:   Recent Results (from the past 48 hour(s))   Head CT w/o contrast    Narrative    CT SCAN OF THE HEAD WITHOUT CONTRAST   9/17/2019 4:04 PM     HISTORY: History of developmental delay; worsening confusion. Recent  fall.    TECHNIQUE: Axial images of the head and coronal reformations without  IV contrast material. Radiation dose for this scan was reduced using  automated exposure control, adjustment of the mA and/or kV according  to patient size, or iterative reconstruction technique.    COMPARISON: 8/22/2017.    FINDINGS: There is some mild cerebral atrophy. The brain parenchyma is  otherwise normal. There is no evidence for intracranial hemorrhage,  mass effect, acute infarct, or skull fracture.      Impression    IMPRESSION: Mild cerebral atrophy. No evidence for intracranial  hemorrhage or any acute process.      PAULO JOHN MD   XR Chest 1 View    Narrative    EXAM: XR CHEST 1 VW  LOCATION: [facility]  DATE/TIME: 9/17/2019 5:42 PM    INDICATION: Altered mental status  COMPARISON: None      Impression    IMPRESSION: Suboptimal ventilatory effort with compressive and congestive change. Possible superimposed right lower lobe pneumonia. Retrocardiac consolidation and atelectasis. Heart and central vessels unremarkable. Multiple old rib fractures. Comparison   with prior would be most helpful, if none recommend follow-up.         Ted Clinton MD , MD.

## 2019-09-20 NOTE — PLAN OF CARE
VS stable. 94% to 97% on room air. Diminished LS with non productive cough. Confused. Non verbal. Slept well throughout the night.

## 2019-09-20 NOTE — PROVIDER NOTIFICATION
Patient lost IV. Attempts x3, failed due to patient noncompliance and agitation,  MD notified. IV meds changed to PO, see MD notes.

## 2019-09-20 NOTE — PLAN OF CARE
Sitter at bedside. Pt disoriented x 4; non-verbal and confused; VSS; Lung sounds diminished with nonproductive intermittent cough, RA. Pt pulled out IV; left forearm skin is pink, no edema, no drainage, scabbing areas, open-to-air; no IV access (MD notified); all medications PO - crush and mix with pudding. Pt does not like to be touched and will become combative. Possible discharge 1-2 days to prior living arrangements. Will continue with plan of care.

## 2019-09-20 NOTE — PROGRESS NOTES
IV restart requested do to infiltration.  Multiple attempts at starting IV with assist of 3 other staff to physically restrain patient as he is kicking and resisting.  Patient extremely distressed and vocal.  Unable to attempt another IV at this time.  Pt physically refuses to have IV placed.

## 2019-09-20 NOTE — PLAN OF CARE
VSS. Sitter at bedside for agitation and confusion. Pills crushed in vanilla pudding. Disorientated x4. Incontinent. Total feed. Up with 2 and lift. Plan: possibly discharge back to group home 1-2 days.

## 2019-09-20 NOTE — PROGRESS NOTES
Cross cover page regarding patient losing IV and has failed 3 attempts due to severe agitation and he is physically refusing to having IV put back in place.  Reviewed chart thoroughly.  He is on ceftriaxone and azithromycin for CAP versus aspiration pneumonia.  He was seen by SLP earlier, best modified diet in place given his overall prognosis and wishes.  Given that we cannot get an IV and at this time and he is due for antiepileptic meds and antibiotics will attempt change to oral regimen and see how he tolerates swallowing meds with best aspiration precautions.  -Change ceftriaxone to Augmentin given high likelihood of aspiration pneumonia.  Change azithromycin to oral formulation, dose tomorrow  -Change lactulose enemas to oral lactulose due to agitation  -Change IV Protonix to p.o.  -IV for Ativan and Haldol as needed so reordered 15 mg at bedtime Zyprexa that he is on PTA  -Change Depakote to oral formulation with the dose reduction recommended by neurology at 250 mg twice daily

## 2019-09-20 NOTE — PLAN OF CARE
Patient on RA, O2 96%. IV infiltrated, patient not able to keep compress on, but swelling down, CMS intact, no redness or hardiness.Not able to stated IV due to patient agitation and noncompliance  with team. MD notified, see  MD notes. Good appetite, total feed. Continue to monitor.

## 2019-09-20 NOTE — PROGRESS NOTES
Brief agustina note:    Agustina left sever vms with the pt's guardian Rosita Simmons through the county, [phone] (she works [phone]) requesting a copy of the pt's guardianship papers to send to Ivana Gordon.  Rosita's voicemail said to call Meenu who works on Monday [phone] if she is out of the office.  Rosita's vm also said to call the CPS guardianship line if immediate assistance is needed [phone].  Agustina called the CPS line and requested the documentation, they said that someone will call sw back.  Agustina called Jennifer  [phone] and they said that they do not have all of the guardianship paperwork.    There was a document on the pt's chart for DNR/DNI from the state [facility] with the Rosita's name on it.  Rosita did call back and said that she does not have the guardianship paperwork, she has to request it from the state.  Agustina provided her with their fax number [phone], so that on Monday, they can fax sw the paperwork.  Rosita said that she put in the request, but the state office is closed on the weekend.  Agustina updated [employer] via email and sent them the DNR/DNI paperwork.

## 2019-09-21 ENCOUNTER — APPOINTMENT (OUTPATIENT)
Dept: PHYSICAL THERAPY | Facility: CLINIC | Age: 64
DRG: 177 | End: 2019-09-21
Attending: INTERNAL MEDICINE
Payer: MEDICARE

## 2019-09-21 PROCEDURE — 25000132 ZZH RX MED GY IP 250 OP 250 PS 637: Mod: GY | Performed by: INTERNAL MEDICINE

## 2019-09-21 PROCEDURE — 12000000 ZZH R&B MED SURG/OB

## 2019-09-21 PROCEDURE — 97161 PT EVAL LOW COMPLEX 20 MIN: CPT | Mod: GP | Performed by: PHYSICAL THERAPIST

## 2019-09-21 PROCEDURE — 99232 SBSQ HOSP IP/OBS MODERATE 35: CPT | Performed by: STUDENT IN AN ORGANIZED HEALTH CARE EDUCATION/TRAINING PROGRAM

## 2019-09-21 RX ADMIN — DIVALPROEX SODIUM 250 MG: 125 TABLET, DELAYED RELEASE ORAL at 20:21

## 2019-09-21 RX ADMIN — OLANZAPINE 15 MG: 2.5 TABLET, FILM COATED ORAL at 21:07

## 2019-09-21 RX ADMIN — DIVALPROEX SODIUM 250 MG: 125 TABLET, DELAYED RELEASE ORAL at 10:54

## 2019-09-21 RX ADMIN — PANTOPRAZOLE SODIUM 40 MG: 40 TABLET, DELAYED RELEASE ORAL at 06:34

## 2019-09-21 RX ADMIN — ACETAMINOPHEN 650 MG: 325 TABLET, FILM COATED ORAL at 10:55

## 2019-09-21 RX ADMIN — AMOXICILLIN AND CLAVULANATE POTASSIUM 1 TABLET: 875; 125 TABLET, FILM COATED ORAL at 10:55

## 2019-09-21 RX ADMIN — PANTOPRAZOLE SODIUM 40 MG: 40 TABLET, DELAYED RELEASE ORAL at 16:26

## 2019-09-21 RX ADMIN — AZITHROMYCIN MONOHYDRATE 250 MG: 250 TABLET ORAL at 10:55

## 2019-09-21 RX ADMIN — AMOXICILLIN AND CLAVULANATE POTASSIUM 1 TABLET: 875; 125 TABLET, FILM COATED ORAL at 20:21

## 2019-09-21 ASSESSMENT — ACTIVITIES OF DAILY LIVING (ADL)
ADLS_ACUITY_SCORE: 38

## 2019-09-21 NOTE — PLAN OF CARE
"Discharge Planner PT   Patient plan for discharge: return to Group Home  Current status: Bed mobility independent, sit to stand and gait with assist of 1 CGA to min assist for safety due to decreased balance and impulsive.  At baseline per RN per Group home staff pt ambulates with high top tennis shoes, gait belt and assist of 1 person standing behind pt and hand placement on gait belt and other under axilla.   Will walk if tell him \"Gigi lets go for a walk\"  Gigi did complete gait with set up as above bed to chair 20 feet.  It does appear this is his baseline.   Barriers to return to prior living situation: none from mobility stand point  Recommendations for discharge: return to   Rationale for recommendations: at baseline.        Entered by: Merced Kay 09/21/2019 10:58 AM      "

## 2019-09-21 NOTE — PLAN OF CARE
Sitter at bedside. Pt disoriented x 4; non-verbal and confused; VSS; Pt up Asst:1 w/gait belt. Lung sounds diminished with nonproductive intermittent cough, RA. Pt pulled out IV; left forearm skin is pink, no edema, no drainage, scabbing areas, open-to-air; no IV access (MD notified); all medications PO - give with pudding. Pt does not like to be touched and will become combative. Possible discharge tomorrow to prior living arrangements. Will continue with plan of care.

## 2019-09-21 NOTE — PLAN OF CARE
Unable to assess orientation as pt is non-verbal - inconsistent with following commands, VSS, Mechanical lift Ax2, combative with cares, seizure precautions maintained, DD1 with thickened liquids - total feed, incontinent of bowel and bladder, bedside attendant present, non tele pt, no PIV access - MD aware, LS diminished - intermittent non-productive cough, PT SW and neurology following, discharge plan 1-2 days, continue with plan of care.

## 2019-09-21 NOTE — PROGRESS NOTES
[facility]  Hospitalist Progress Note  Kaushik Hammer MD 09/21/2019    Reason for Stay (Diagnosis): encephalopathy, aspiration pneumonia         Assessment and Plan:      Summary of Stay: Gigi Lackey is a 63-year-old male who is a state aponte w/ guardian with a past medical history significant for phenylketonuria, mental development disorder, epilepsy, GERD, and chronic anemia who is nonverbal at baseline who presented here for altered mental status on 9/17/19.  Notably, patient was seen here 3 weeks ago after an unwitnessed fall that resulted in a nasal injury and facial laceration which had been fixed.  Since then, they noted that he has been a bit unsteady but overall was able to interact and ambulate with assist.  However, over the 24 to 48 hours prior to admission his symptoms have remarkably changed and was unable to ambulate and required a higher level assistance to transfer.  They also noted that he has been less interactive and had periods where he was quite agitated.  At baseline, patient apparently is able to interact with staff and make his needs known by using his hands but is nonverbal.     Gigi was found to have elevated ammonia level of 76 prompting us to hold his depakote and start lactulose enemas.  Furthermore, the night of admission he was noted to have increased work of breathing and rising oxygen requirement.  Chest x-ray appears to show likely right lower lobe pneumonia.  He was started on IV antibiotics and has a significant oxygen requirement.    GuardianRosita, with given update the morning of 9/17.  Expressed concern regarding how ill Gigi was and confirmed that he is DNR/DNI.  She indicated that generally quality of life is considered most important for him but for now we will continue restorative cares for now.  Overall over the past 4 days he has become much more alert and as a result, I suspect, combative with cares.  He pulled out his IV and treatments have been  changed to oral options.  Social work will consulted as we will have to see if he is nearing his baseline/ready to return to his prior living facility.     Problem list/Plan:  1. Acute encephalopathy: Resolving.  I suspect he is not far from his baseline at this point.  CT of the head was negative.  Likely due to a combination of elevated ammonia from Depakote which has improved following lactulose administration and acute hypoxemic respiratory failure due to aspiration pneumonia.    --Encephalopathy improving, now awake but somewhat agitated  --Continue to treat pneumonia, remains on a reduced dose of Depakote    2. Acute hypoxemic respiratory failure due to pneumonia, suspect aspiration: As per SLP now providing pudding thickened liquids.  Changed from IV ceftriaxone and azithromycin on 9/19.  He is DNR/DNI.  I have concern regarding ongoing aspiration.  Prognosis is poor overall but his guardian is aware of this..  Was actually requiring up to 70% FiO2 from high flow system on 9/18.  Now weaning down rapidly.  --Augmentin, will hold off on further azithromycin given the high likelihood that this is aspiration  --SLP following regarding diet advancement.  At baseline is on thickened liquids.  --Supplemental oxygen as needed  --If he decompensates will need to contact his guardian.  I already indicated to her that if he gets significantly worse in spite of our treatments here that we should pursue a comfort approach but patient clinically improving, now down to RA.     3.   History of seizure disorder: Chronically on Depakote for maintenance.  No recent seizure events.  Initially held Depakote secondary to elevated ammonia levels.  Neurology recommending dose reduction.  --changed to oral Depakote 250 mg twice daily which is a dose reduction as recommended by neurology.    4.   Mentally developmental delay: Patient is a state aponte but care is to restorative up until heroic measures.  Nonverbal at baseline but can  "apparently make basic needs known with some hand gestures etc.    5.   History of phenylketonuria: If we do advance his diet he will certainly need to follow appropriate restrictions for phenylketonuria.    6.   Chronic anemia: No evidence of acute blood loss anemia.  Hemoglobin is stable.  Last hemoglobin back in 2016 was 10.8.  Continue to monitor for now.    7.   Aspiration precautions as patient does seem to be drooling at times and not swallowing saliva: Suction oral secretions as needed.     At baseline takes thickened liquids for all oral intake.  --slp following  --considered trial of NJ tube if unable to advance diet but he would not tolerate this without significant sedation which would outweigh benefits     DVT Prophylaxis: Pneumatic Compression Devices  Code Status: DNR / DNI  Discharge Dispo/Date: Tomorrow if group home able to take back over the weekend        Interval History (Subjective):        Advanced to thickened liquids  O2 requirement now down to RA  Tolerating PO meds  SW consulted, group home personal possibly coming by today to assess patient for return.          Physical Exam:        Last Vital Signs:  BP (!) 116/28 (BP Location: Right leg)   Pulse 99   Temp 99  F (37.2  C) (Axillary)   Resp 16   Ht 1.626 m (5' 4\")   Wt 55 kg (121 lb 3.2 oz)   SpO2 93%   BMI 20.80 kg/m      I/O last 3 completed shifts:  In: 270 [P.O.:270]  Out: -     General: no apparent distress  Cardiac: RRR, S1, S2.     Pulmonary: coarse breath sounds.  No wheezing noted.  Good air movement. No respiratory distress.  Abdomen: soft, apparently non-tender, non-distended.  Extremities: No edema.  Skin: no rashes or lesions noted.  Warm and Dry.  Neuro: Eyes open, fidgeting in bed.  Swatting when providers could close.  No clonus, tremor or seizure activity  Psych: Agitated         Medications:      All current medications were reviewed with changes reflected in problem list.         Data:      All new lab and imaging " data was reviewed.   Labs:  Recent Labs   Lab 09/18/19  0633      POTASSIUM 4.1   CHLORIDE 111*   CO2 24   ANIONGAP 8   *   BUN 19   CR 0.55*   GFRESTIMATED >90   GFRESTBLACK >90   CHAUNCEY 8.5     Recent Labs   Lab 09/18/19  0633   WBC 9.6   HGB 11.7*   HCT 36.4*            Imaging:   Recent Results (from the past 48 hour(s))   Head CT w/o contrast    Narrative    CT SCAN OF THE HEAD WITHOUT CONTRAST   9/17/2019 4:04 PM     HISTORY: History of developmental delay; worsening confusion. Recent  fall.    TECHNIQUE: Axial images of the head and coronal reformations without  IV contrast material. Radiation dose for this scan was reduced using  automated exposure control, adjustment of the mA and/or kV according  to patient size, or iterative reconstruction technique.    COMPARISON: 8/22/2017.    FINDINGS: There is some mild cerebral atrophy. The brain parenchyma is  otherwise normal. There is no evidence for intracranial hemorrhage,  mass effect, acute infarct, or skull fracture.      Impression    IMPRESSION: Mild cerebral atrophy. No evidence for intracranial  hemorrhage or any acute process.      PAULO JOHN MD   XR Chest 1 View    Narrative    EXAM: XR CHEST 1 VW  LOCATION: [facility]  DATE/TIME: 9/17/2019 5:42 PM    INDICATION: Altered mental status  COMPARISON: None      Impression    IMPRESSION: Suboptimal ventilatory effort with compressive and congestive change. Possible superimposed right lower lobe pneumonia. Retrocardiac consolidation and atelectasis. Heart and central vessels unremarkable. Multiple old rib fractures. Comparison   with prior would be most helpful, if none recommend follow-up.         Kaushik Hammer MD

## 2019-09-21 NOTE — CONSULTS
Care Transition Initial Assessment -      Active Problems:    Acute encephalopathy       DATA  Lives With: facility resident         Description of Support System: Supportive, Involved  Who is your support system?: Facility resident(s)/Staff    Identified issues/concerns regarding health management: Pt is from [facility] to assist with return.      Transportation Anticipated: agency( Staff)    ASSESSMENT  Cognitive Status: Pt is non verbal.   Concerns to be addressed: Pt is from [facility] in [facility] to assist with return to . [facility] nurse has questions regarding pt not eating and pt's baselie. SW called the  and spoke to the nurse Bri. Pt likes canned fruit and and ground beef. Pt sometimes refuses things he doesn't like. The  staff plans to come to visit pt later today, SW expressed that a visit is ideal to assess if pt is back at baseline.  [facility] RN plans to call  nurse.   The  staff have a vehicle and can provide transportation at discharge.     PLAN  Patient anticipates discharging to:  .     TERE Owens  Casual SW [id number]

## 2019-09-21 NOTE — PROGRESS NOTES
09/21/19 1000   Quick Adds   Type of Visit Initial PT Evaluation   Living Environment   Lives With facility resident   Transportation Anticipated agency  ( Staff)   Functional Level Prior   Ambulation 2-->assistive person   Transferring 2-->assistive person   Toileting 4-->completely dependent   Bathing 4-->completely dependent   Communication 3-->unable to speak (not related to language barrier)  (non  verbal)   Swallowing 2-->difficulty swallowing liquids   Cognition 2 - difficulty with organizing thoughts   Which of the above functional risks had a recent onset or change? ambulation   General Information   Onset of Illness/Injury or Date of Surgery - Date 09/17/19   Referring Physician Dr. Clinton   Patient/Family Goals Statement pt non verbal   Pertinent History of Current Problem (include personal factors and/or comorbidities that impact the POC) Gigi Lackey is a 63 year old male who presents with altered mental status.  Patient's group home staff is the primary historian.  Patient had presented to the ED approximately 3 weeks prior after a unwitnessed fall resulting in a nasal injury and facial laceration.  Since that time they have noticed that he has been mildly unstable but overall still able to ambulate and interact.  It was not until the last 24 to 48 hours that his symptoms have remarkably changed.  In that timeframe, the patient has been unable to ambulate and has required much higher level of assistance to mobilize.  He has been unable to ambulate even with assistance at this time.  He has been less interactive and more agitated with staff.  At baseline patient is able to interact with staff and make decisions utilizing his hands but is unable to verbally communicate.   Precautions/Limitations fall precautions   Cognitive Status Examination   Personal Safety and Judgment impulsive   Cognitive Comment pt is non verbal. Follows commands but has been batting away caregivers hands. Doesnt like  "to be touched per RN   Range of Motion (ROM)   ROM Comment functional for mobility with assist.    Strength   Strength Comments functionally able to transfer supine to sit without assist, sit to stand CGA for safety.    Bed Mobility   Bed Mobility Comments IND   Transfer Skills   Transfer Comments  CGA to min assist   Gait   Gait Comments at baseline ambulates with gait belt in place, high top tennis shoes and assist at gait belt with helper behind pt and holding belt and under axilla   Balance   Balance Comments decreased standing balance, impulsive   Clinical Impression   Criteria for Skilled Therapeutic Intervention evaluation only   PT Diagnosis impaired mobility and gait   Clinical Impression Comments Short eval completed due to pt limited ability to participate.  No further PT needed.  It appears pt is at his baseline.    [facility] [employer] TM \"6 Clicks\"   2016, Trustees of [facility], under license to [employer].  All rights reserved.   6 Clicks Short Forms Basic Mobility Inpatient Short Form   [facility] [employer]  \"6 Clicks\" V.2 Basic Mobility Inpatient Short Form   1. Turning from your back to your side while in a flat bed without using bedrails? 4 - None   2. Moving from lying on your back to sitting on the side of a flat bed without using bedrails? 4 - None   3. Moving to and from a bed to a chair (including a wheelchair)? 3 - A Little   4. Standing up from a chair using your arms (e.g., wheelchair, or bedside chair)? 3 - A Little   5. To walk in hospital room? 3 - A Little   6. Climbing 3-5 steps with a railing? 2 - A Lot   Basic Mobility Raw Score (Score out of 24.Lower scores equate to lower levels of function) 19   Total Evaluation Time   Total Evaluation Time (Minutes) 15     "

## 2019-09-22 VITALS
TEMPERATURE: 97.8 F | HEART RATE: 99 BPM | OXYGEN SATURATION: 92 % | RESPIRATION RATE: 20 BRPM | WEIGHT: 121.2 LBS | SYSTOLIC BLOOD PRESSURE: 110 MMHG | DIASTOLIC BLOOD PRESSURE: 51 MMHG | HEIGHT: 64 IN | BODY MASS INDEX: 20.69 KG/M2

## 2019-09-22 PROCEDURE — 99239 HOSP IP/OBS DSCHRG MGMT >30: CPT | Performed by: INTERNAL MEDICINE

## 2019-09-22 PROCEDURE — 25000132 ZZH RX MED GY IP 250 OP 250 PS 637: Mod: GY | Performed by: INTERNAL MEDICINE

## 2019-09-22 RX ORDER — DIVALPROEX SODIUM 250 MG/1
250 TABLET, DELAYED RELEASE ORAL 2 TIMES DAILY
Qty: 60 TABLET | Refills: 0 | Status: SHIPPED | OUTPATIENT
Start: 2019-09-22

## 2019-09-22 RX ADMIN — AMOXICILLIN AND CLAVULANATE POTASSIUM 1 TABLET: 875; 125 TABLET, FILM COATED ORAL at 08:23

## 2019-09-22 RX ADMIN — DIVALPROEX SODIUM 250 MG: 125 TABLET, DELAYED RELEASE ORAL at 08:23

## 2019-09-22 RX ADMIN — LACTULOSE 10 G: 20 SOLUTION ORAL at 08:23

## 2019-09-22 RX ADMIN — PANTOPRAZOLE SODIUM 40 MG: 40 TABLET, DELAYED RELEASE ORAL at 06:44

## 2019-09-22 ASSESSMENT — ACTIVITIES OF DAILY LIVING (ADL)
ADLS_ACUITY_SCORE: 38

## 2019-09-22 NOTE — PROGRESS NOTES
14:30 Pt discharged home at this time accompanied by group home staff.   Staff verbalize understanding of discharge instructions and medications.   Staff acknowledges receipt of all belongings.

## 2019-09-22 NOTE — DISCHARGE SUMMARY
[facility]  Discharge Summary  Name: Gigi Lackey    MRN: [id number]  YOB: 1955    Age: 63 year old  Date of Discharge:  9/22/2019  Date of Admission: 9/17/2019  Primary Care Provider: Janeth Paz  Discharge Physician:  Pablo Clinton MD  Discharging Service:  Hospitalist      Hospital Course/Discharge Diagnoses:  Summary of Stay: Gigi Lackey is a 63-year-old male who is a state aponte w/ guardian with a past medical history significant for phenylketonuria, mental development disorder, epilepsy, GERD, and chronic anemia who is nonverbal at baseline who presented here for altered mental status on 9/17/19.  Notably, patient was seen here 3 weeks ago after an unwitnessed fall that resulted in a nasal injury and facial laceration which had been fixed.  Since then, they noted that he has been a bit unsteady but overall was able to interact and ambulate with assist.  However, over the 24 to 48 hours prior to admission his symptoms have remarkably changed and was unable to ambulate and required a higher level assistance to transfer.  They also noted that he has been less interactive and had periods where he was quite agitated.  At baseline, patient apparently is able to interact with staff and make his needs known by using his hands but is nonverbal.      Gigi was found to have elevated ammonia level of 76 prompting us to hold his depakote and start lactulose enemas.  Furthermore, the night of admission he was noted to have increased work of breathing and rising oxygen requirement.  Chest x-ray appears to show likely right lower lobe pneumonia.  He was started on IV antibiotics and has a significant oxygen requirement.     Guardian, Rosita, was given update the morning of 9/17.  Expressed concern regarding how ill Gigi was and confirmed that he is DNR/DNI.  She indicated that generally quality of life is considered most important for him but for now we will continue  restorative cares for now.  Overall over the past 4 days he has become much more alert with dramatic improvement in his respiratory status to the point that I think he is back at his baseline.      Problem list/Plan:  1. Acute encephalopathy: Resolved.  I suspect he is not far from his baseline at this point.  CT of the head was negative.  Likely due to a combination of elevated ammonia from Depakote which has improved following lactulose administration and acute hypoxemic respiratory failure due to aspiration pneumonia.    --Encephalopathy improving, now awake, seems to be at baseline.  --Continue to treat pneumonia, remains on a reduced dose of Depakote     2. Acute hypoxemic respiratory failure due to pneumonia, suspect aspiration: As per SLP now providing pudding thickened liquids.  Changed from IV ceftriaxone and azithromycin on 9/19 oral Augmentin.  He is DNR/DNI.  I have concern regarding ongoing aspiration.  Prognosis is poor overall but his guardian is aware of this..  Was actually requiring up to 70% FiO2 from high flow system on 9/18.  Now weaning down rapidly to room air.  --Augmentin, 3 days more upon discharge.  --SLP following regarding diet advancement.  At baseline is on thickened liquids.     3.   History of seizure disorder: Chronically on Depakote for maintenance.  No recent seizure events.  Initially held Depakote secondary to elevated ammonia levels.  Neurology recommending dose reduction.  --changed to oral Depakote 250 mg twice daily which is a dose reduction as recommended by neurology.  --Recommend checking ammonia as an outpatient and if he has any recurrent issues with elevated ammonia needs to see his primary neurologist.  --No seizure activity noted here.     4.   Mentally developmental delay: Patient is a state aponte but care is to restorative up until heroic measures.  Nonverbal at baseline but can apparently make basic needs known with some hand gestures etc.     5.   History of  phenylketonuria     6.   Chronic anemia: No evidence of acute blood loss anemia.  Hemoglobin is stable.  Last hemoglobin back in 2016 was 10.8.  Continue to monitor for now.     7.   Aspiration precautions as patient does seem to be drooling at times and not swallowing saliva: Suction oral secretions as needed.     At baseline takes thickened liquids for all oral intake.  --slp following  --considered trial of NJ tube if unable to advance diet but he would not tolerate this without significant sedation which would outweigh benefits  --now back on thickened liquids.         Discharge Disposition:  Discharged to group home.     Allergies:  Allergies   Allergen Reactions     Actifed [Allerfrim]      Antihistamines, Chlorpheniramine-Type [Alkylamines]      Doxycycline      Morphine      Sympathomimetics         Discharge Medications:        Review of your medicines      START taking      Dose / Directions   amoxicillin-clavulanate 875-125 MG tablet  Commonly known as:  AUGMENTIN  Indication:  Pneumonia caused by Inhaling a Substance Into the Lungs  Used for:  Aspiration pneumonia of right lung, unspecified aspiration pneumonia type, unspecified part of lung (H)      Dose:  1 tablet  Take 1 tablet by mouth every 12 hours for 3 days  Quantity:  6 tablet  Refills:  0        CONTINUE these medicines which may have CHANGED, or have new prescriptions. If we are uncertain of the size of tablets/capsules you have at home, strength may be listed as something that might have changed.      Dose / Directions   divalproex sodium delayed-release 250 MG DR tablet  Commonly known as:  DEPAKOTE  This may have changed:      when to take this    Another medication with the same name was removed. Continue taking this medication, and follow the directions you see here.      Dose:  250 mg  Take 1 tablet (250 mg) by mouth 2 times daily  Quantity:  60 tablet  Refills:  0        CONTINUE these medicines which have NOT CHANGED      Dose /  Directions   ACETAMINOPHEN 8 HOUR 650 MG CR tablet  Generic drug:  acetaminophen      Dose:  1,300 mg  Take 1,300 mg by mouth 2 times daily  Refills:  0     * calcium carbonate 500 MG chewable tablet  Commonly known as:  TUMS      Dose:  2 chew tab  Take 2 chew tab by mouth daily in afternoon  Refills:  0     * calcium carbonate 500 MG chewable tablet  Commonly known as:  TUMS      Dose:  1 chew tab  Take 1 chew tab by mouth every morning  Refills:  0     carbamide peroxide 6.5 % otic solution  Commonly known as:  DEBROX      Dose:  4 drop  Place 4 drops into both ears Daily for 5 days of each month. Begin on the first Adrian of each month.  Refills:  0     docusate sodium 100 MG capsule  Commonly known as:  COLACE      Dose:  200 mg  Take 200 mg by mouth every morning  Refills:  0     folic acid 1 MG tablet  Commonly known as:  FOLVITE      Dose:  1 mg  Take 1 mg by mouth daily  Refills:  0     LEXAPRO 5 MG tablet  Generic drug:  escitalopram      Dose:  15 mg  Take 15 mg by mouth daily  Refills:  0     mirtazapine 15 MG tablet  Commonly known as:  REMERON      Dose:  15 mg  Take 15 mg by mouth At Bedtime  Refills:  0     Multi-vitamin tablet      Dose:  1 tablet  Take 1 tablet by mouth daily  Refills:  0     naproxen 500 MG tablet  Commonly known as:  NAPROSYN      Dose:  500 mg  Take 500 mg by mouth 2 times daily (with meals)  Refills:  0     pantoprazole 40 MG EC tablet  Commonly known as:  PROTONIX      Dose:  40 mg  Take 40 mg by mouth 2 times daily  Refills:  0     psyllium 58.6 % powder  Commonly known as:  METAMUCIL/KONSYL      Dose:  1 Tablespoonful  Take 1 Tablespoonful by mouth every morning  Refills:  0     senna 8.6 MG tablet  Commonly known as:  SENOKOT      Dose:  1 tablet  Take 1 tablet by mouth daily  Refills:  0     vitamin D3 1000 units (25 mcg) tablet  Commonly known as:  CHOLECALCIFEROL      Take by mouth daily  Refills:  0     ZYPREXA 15 MG tablet  Generic drug:  OLANZapine      Dose:  15  "mg  Take 15 mg by mouth At Bedtime  Refills:  0         * This list has 2 medication(s) that are the same as other medications prescribed for you. Read the directions carefully, and ask your doctor or other care provider to review them with you.               Where to get your medicines      These medications were sent to [facility] [place], [place] - [address] [facility]  [address] [facility] [address]    Phone:  [phone]     amoxicillin-clavulanate 875-125 MG tablet    divalproex sodium delayed-release 250 MG DR tablet         Condition on Discharge:  Discharge condition: Stable       Code status on discharge: dnr/dni     History of Illness:  See detailed admission note for full details.    Physical Exam:  Vital signs:  Temp: 97.8  F (36.6  C) Temp src: Temporal BP: 110/51   Heart Rate: 63 Resp: 20 SpO2: 92 % O2 Device: None (Room air) Oxygen Delivery: (S) Other (Comments)(20 lpm) Height: 162.6 cm (5' 4\") Weight: 55 kg (121 lb 3.2 oz)  Estimated body mass index is 20.8 kg/m  as calculated from the following:    Height as of this encounter: 1.626 m (5' 4\").    Weight as of this encounter: 55 kg (121 lb 3.2 oz).    Wt Readings from Last 1 Encounters:   09/17/19 55 kg (121 lb 3.2 oz)     General: Somewhat debilitated appearing male with contracted upper extremity   HEENT: NC/AT, eyes anicteric  Cardiac: RRR, S1, S2.    Pulmonary: Bilateral slightly coarse breath sounds, dramatically improved from prior exams. No wheezing noted.  Good air movement.  Normal work of breathing.  Abdomen: soft, apparently non-tender, non-distended.  Extremities: No edema.  Skin: no rashes or lesions noted.  Warm and Dry.  Neuro: Eyes open, up in chair.  Some hand gestures noted.   No clonus, tremor or seizure activity  Psych: Calm.    Procedures other than Imaging:  none     Imaging:  Results for orders placed or performed during the hospital encounter of 09/17/19   Head CT w/o contrast    Narrative    " CT SCAN OF THE HEAD WITHOUT CONTRAST   9/17/2019 4:04 PM     HISTORY: History of developmental delay; worsening confusion. Recent  fall.    TECHNIQUE: Axial images of the head and coronal reformations without  IV contrast material. Radiation dose for this scan was reduced using  automated exposure control, adjustment of the mA and/or kV according  to patient size, or iterative reconstruction technique.    COMPARISON: 8/22/2017.    FINDINGS: There is some mild cerebral atrophy. The brain parenchyma is  otherwise normal. There is no evidence for intracranial hemorrhage,  mass effect, acute infarct, or skull fracture.      Impression    IMPRESSION: Mild cerebral atrophy. No evidence for intracranial  hemorrhage or any acute process.      PAULO JOHN MD   XR Chest 1 View    Narrative    EXAM: XR CHEST 1 VW  LOCATION: [facility]  DATE/TIME: 9/17/2019 5:42 PM    INDICATION: Altered mental status  COMPARISON: None      Impression    IMPRESSION: Suboptimal ventilatory effort with compressive and congestive change. Possible superimposed right lower lobe pneumonia. Retrocardiac consolidation and atelectasis. Heart and central vessels unremarkable. Multiple old rib fractures. Comparison   with prior would be most helpful, if none recommend follow-up.   XR Cervical Spine 2/3 Views    Narrative    XR CERVICAL SPINE 2/3 VWS 9/19/2019 9:47 AM     HISTORY: remote history of fall on his face/nose ?2 weeks ago.   evaluate for any evidence of acute/subacute pathology/fracture    COMPARISON: CT cervical spine 6/5/2016.      Impression    IMPRESSION: The examination is limited, as the C6 and C7 vertebral  bodies are not well evaluated on the lateral view, and the  cervicothoracic junction is essentially not visualized on the lateral  view due to radiographic overlap. There is again levoconvex curvature  of the cervical spine, with exaggeration of the normal cervical  lordosis. There appears to be trace anterolisthesis of  C3 on C4. The  vertebral body heights appear grossly maintained. There is multilevel  degenerative disc disease, as before, with probable most pronounced  disc space narrowing at C5-C6. Prominent facet arthropathy in the mid  cervical spine is noted, particularly on the right along the  compressive side of the scoliotic curve. The prevertebral soft tissues  are within normal limits. Right carotid bifurcation atherosclerotic  disease is again noted.    HUI JOSEPH MD        Consultations:  Consultation during this admission received from dietician, neurology.       Recent Lab Results:  Recent Labs   Lab 09/18/19  0633 09/17/19  1503   WBC 9.6 6.3   HGB 11.7* 12.5*   HCT 36.4* 38.8*    101*    294          Lab Results   Component Value Date     09/18/2019     09/17/2019     06/05/2016    Lab Results   Component Value Date    CHLORIDE 111 09/18/2019    CHLORIDE 108 09/17/2019    CHLORIDE 110 06/05/2016    Lab Results   Component Value Date    BUN 19 09/18/2019    BUN 18 09/17/2019    BUN 18 06/05/2016      Lab Results   Component Value Date    POTASSIUM 4.1 09/18/2019    POTASSIUM 4.3 09/17/2019    POTASSIUM 4.1 06/05/2016    Lab Results   Component Value Date    CO2 24 09/18/2019    CO2 27 09/17/2019    CO2 28 06/05/2016    Lab Results   Component Value Date    CR 0.55 09/18/2019    CR 0.58 09/17/2019    CR 0.68 06/05/2016             Pending Results:    Unresulted Labs Ordered in the Past 30 Days of this Admission     No orders found from 8/18/2019 to 9/18/2019.           Discharge Instructions and Follow-Up:   Discharge Procedure Orders   Reason for your hospital stay   Order Comments: You were admitted for confusion related to elevated ammonia level and aspiration pneumonia.  You have been treated with IV and now oral antibiotics and your Depakote was reduced as this can lead to elevated ammonia levels.  He will continue 3 days more of antibiotics at home.     Follow-up and  recommended labs and tests    Order Comments: Follow up with primary care provider, Janeth Paz, within 7 days for hospital follow- up.  The following labs/tests are recommended: Please recheck an ammonia level.  If your ammonia level is elevated you may need to see her neurologist regarding adjusting seizure medications.     Activity   Order Comments: Your activity upon discharge: activity as tolerated     Order Specific Question Answer Comments   Is discharge order? Yes      Diet   Order Comments: Follow this diet upon discharge: Orders Placed This Encounter      Combination Diet Dysphagia Diet Level 1: Pureed; Pudding/Spoon Thick Liquids (use instant food thickener)     Order Specific Question Answer Comments   Is discharge order? Yes        Total time spent in face to face contact with the patient and coordinating discharge was:  40 Minutes.

## 2019-09-22 NOTE — PLAN OF CARE
Pt admitted for Altered LOC. Pt nonverbal from a group home. Pt found to have elevated ammonia,  scheduled lactulose.  Evening dose held due to 4 BM for the day. Seizure precautions maintained. Sitter at bedside. Pt refused VS in the night. Pt verbal and rolling away from staff. VSS this AM . Anticipate discharge back to group home today. Will continue POC

## 2019-09-22 NOTE — PROGRESS NOTES
D: TERI is coordinating pt's return [facility]. Pt has a discharge order for today.  A: TERI called the  and spoke to Jojo. The  saff will  oat at 2:00pm today. Pt's new medication will need to be filled here.   P: Pt to return to [facility] (in [place]) today at 2:00pm.     TERE Owens  Casual TERI [id number]

## 2019-09-30 ENCOUNTER — HEALTH MAINTENANCE LETTER (OUTPATIENT)
Age: 64
End: 2019-09-30

## 2019-12-03 ENCOUNTER — HOSPITAL ENCOUNTER (EMERGENCY)
Facility: CLINIC | Age: 64
Discharge: HOME | End: 2019-12-03
Attending: EMERGENCY MEDICINE | Admitting: EMERGENCY MEDICINE
Payer: MEDICARE

## 2019-12-03 VITALS
DIASTOLIC BLOOD PRESSURE: 76 MMHG | RESPIRATION RATE: 18 BRPM | TEMPERATURE: 97.1 F | HEART RATE: 76 BPM | SYSTOLIC BLOOD PRESSURE: 149 MMHG | OXYGEN SATURATION: 98 %
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
## 2019-12-03 DIAGNOSIS — S01.81XA FACIAL LACERATION, INITIAL ENCOUNTER: ICD-10-CM

## 2019-12-03 PROCEDURE — 99283 EMERGENCY DEPT VISIT LOW MDM: CPT

## 2019-12-03 PROCEDURE — 12011 RPR F/E/E/N/L/M 2.5 CM/<: CPT

## 2019-12-03 PROCEDURE — 27210282 ZZH ADHESIVE DERMABOND SKIN

## 2019-12-03 RX ORDER — METHYLCELLULOSE 4000CPS 30 %
POWDER (GRAM) MISCELLANEOUS
Status: DISCONTINUED
Start: 2019-12-03 | End: 2019-12-03 | Stop reason: HOSPADM

## 2019-12-03 RX ORDER — OLANZAPINE 10 MG/2ML
10 INJECTION, POWDER, FOR SOLUTION INTRAMUSCULAR ONCE
Status: DISCONTINUED | OUTPATIENT
Start: 2019-12-03 | End: 2019-12-03

## 2019-12-03 NOTE — ED NOTES
MD Live discussed risk versus benefit for zyprexa and head CT. Caregivers verbalized understanding. Patient has remained at baseline mentation since fall.

## 2019-12-03 NOTE — ED PROVIDER NOTES
History   Chief Complaint:  Fall    HPI   History limited due to cognitive delay. History supplemented by caregivers from patient's group home.    Gigi Lackey is a 64 year old male with a history of phenylketonuria, intellectual disability, and epilepsy who presents for evaluation after a fall.  Unwitnessed fall around 4:30 AM this morning.  Patient checked on about every hour by staff, he was unattended to for only minutes according to the staff who were with him today.  He is at his baseline behavior/mentation and activity level.  Have not noted any areas of trauma other than the bridge of the nose and underneath the left eye.  He is not on any anticoagulants.  His health has been at baseline last few days.    Allergies:  Actifed  Alkylamines  Doxycycline  Morphine  Pseudoephedrine  Sympathomimetics      Medications:    divalproex sodium delayed-release (DEPAKOTE) 250 MG DR tablet  docusate sodium (COLACE) 100 MG capsule  escitalopram (LEXAPRO) 5 MG tablet  mirtazapine (REMERON) 15 MG tablet  OLANZapine (ZYPREXA) 15 MG tablet  pantoprazole (PROTONIX) 40 MG EC tablet  senna (SENOKOT) 8.6 MG tablet    Past Medical History:    Anemia  Epilepsy  GERD  Osteoporosis   Pervasive developmental disorder  Profound intellectual disability  Phenylketonuria  Psychosis    Past Surgical History:    Ankle surgery    Family History:    History reviewed. No pertinent family history.     Social History:  Smoking status: Never smoker  Alcohol use: No  Marital Status:  Single [1]     Review of Systems   Unable to perform ROS: Other   Unable to complete due to baseline cognitive delay    Physical Exam   Patient Vitals for the past 24 hrs:   BP Temp Temp src Pulse Resp SpO2   12/03/19 1536 -- -- -- 76 -- --   12/03/19 1513 (!) 149/76 -- -- -- -- --   12/03/19 1329 -- 97.1  F (36.2  C) Temporal -- 18 98 %     Physical Exam  Gen: Sitting in chair no acute distress  HEENT:  mmm, no rhinorrhea, T-shaped 1 cm wound bridge of the nose and  7 mm horizontally oriented wound inferior to the left lower lid no underlying bony abnormalities no epistaxis no ocular injury  Neck: supple, no abnormal swelling  Lungs:  CTAB,  no resp distress  CV: rrr, no m/r/g, ppi  Abd: soft, nontender, nondistended, no rebound/masses/guarding/hsm  Ext: Chronic contractures of extra deformities but no acute appearing soft tissue defects or bony deformities  Skin: warm, dry, well perfused, no rashes/bruising/lesions on exposed skin  Neuro: Awake sitting in a chair moving all extremities at baseline according to staff    Emergency Department Course     Procedures:      Narrative: Procedure: Laceration Repair        LACERATIONS/LOCATIONS: A simple, clean 7 mm laceration to left lower eyelid. A simple, clean, T-shaped 1.0 cm laceration to nose.      FUNCTION:  Distally sensation, circulation and motor are intact.      ANESTHESIA:  LET - Topical (for laceration to nose).      PREPARATION:  Irrigation and Scrubbing with Normal Saline and Shur Clens      DEBRIDEMENT:  No debridement      CLOSURE:  Left lower eyelid wound was closed with Dermabond. Nose wound was closed with One Layer. Skin closed with 1 x 5.0 fast-absorbing GUT suture.    Emergency Department Course:  Past medical records, nursing notes, and vitals reviewed.   1440: I performed an exam of the patient and obtained history, as documented above.    1545: I rechecked the patient. Discussed risks and benefits of head CT and Zyprexa. Patient's caregivers verbalized understanding.    1615: I rechecked the patient. Repaired the lacerations to the patient's face as noted above. Explained findings to the patient's caregivers.    Findings and plan explained to the caregiver. Patient discharged home with instructions regarding supportive care, medications, and reasons to return. The importance of close follow-up was reviewed.     Impression & Plan    Medical Decision Making:  Gigi Lackey is a 64-year-old male nonverbal at  baseline here after unwitnessed fall likely mechanical as staff report no underlying medical concerns and he is currently at his baseline.  Given his history of cognitive delay and agitation with stimulation CT scans are challenging for him given the duration of time and currently being at baseline without anticoagulants I have a low suspicion that he has significant midface or intracranial trauma.  We will not do a CT scan considering the risks of sedation.  Wounds were repaired as above and patient was discharged home with the staff members.    Diagnosis:    ICD-10-CM   1. Fall, initial encounter W19.XXXA   2. Facial laceration, initial encounter S01.81XA       Disposition:  Discharged to group home        David Miller  12/3/2019   [facility] EMERGENCY DEPARTMENT  I, David Miller, am serving as a scribe at 2:57 PM on 12/3/2019 to document services personally performed by Ezekiel Live MD based on my observations and the provider's statements to me.        Ezekiel Live MD  12/04/19 1029

## 2019-12-03 NOTE — ED TRIAGE NOTES
Pt had unwitnessed fall at his group home. Estimated time of fall was 4:30 am, pt was found on the floor. Staff heard the fall and went in right away, pt was awake. Pt is nonverbal at baseline. No blood thinners. Pt has lac to bridge of nose and below left eye.

## 2019-12-15 ENCOUNTER — HEALTH MAINTENANCE LETTER (OUTPATIENT)
Age: 64
End: 2019-12-15

## 2020-05-24 ENCOUNTER — HOSPITAL ENCOUNTER (EMERGENCY)
Facility: CLINIC | Age: 65
Discharge: HOME | End: 2020-05-24
Attending: EMERGENCY MEDICINE | Admitting: EMERGENCY MEDICINE
Payer: MEDICARE

## 2020-05-24 VITALS
DIASTOLIC BLOOD PRESSURE: 93 MMHG | TEMPERATURE: 98.7 F | RESPIRATION RATE: 18 BRPM | SYSTOLIC BLOOD PRESSURE: 113 MMHG | HEART RATE: 90 BPM | OXYGEN SATURATION: 97 %

## 2020-05-24 DIAGNOSIS — Z20.822 EXPOSURE TO COVID-19 VIRUS: ICD-10-CM

## 2020-05-24 DIAGNOSIS — S00.01XA ABRASION OF SCALP, INITIAL ENCOUNTER: ICD-10-CM

## 2020-05-24 PROCEDURE — 87635 SARS-COV-2 COVID-19 AMP PRB: CPT | Performed by: EMERGENCY MEDICINE

## 2020-05-24 PROCEDURE — 99283 EMERGENCY DEPT VISIT LOW MDM: CPT
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
## 2020-05-24 NOTE — ED AVS SNAPSHOT
[facility] Emergency Department  [address]  [facility] [address]  Phone:  [phone]  Fax:  [phone]                                    Gigi Lackey   MRN: [id number]    Department:  [facility] Emergency Department   Date of Visit:  5/24/2020           After Visit Summary Signature Page    I have received my discharge instructions, and my questions have been answered. I have discussed any challenges I see with this plan with the nurse or doctor.    ..........................................................................................................................................  Patient/Patient Representative Signature      ..........................................................................................................................................  Patient Representative Print Name and Relationship to Patient    ..................................................               ................................................  Date                                   Time    ..........................................................................................................................................  Reviewed by Signature/Title    ...................................................              ..............................................  Date                                               Time          22EPIC Rev 08/18

## 2020-05-25 LAB
SARS-COV-2 RNA SPEC QL NAA+PROBE: NOT DETECTED
SPECIMEN SOURCE: NORMAL

## 2021-01-13 ENCOUNTER — TRANSFERRED RECORDS (OUTPATIENT)
Dept: HEALTH INFORMATION MANAGEMENT | Facility: CLINIC | Age: 66
End: 2021-01-13

## 2021-01-13 LAB
CREAT SERPL-MCNC: 0.6 MG/DL (ref 0.73–1.18)
GFR SERPL CREATININE-BSD FRML MDRD: >60 ML/MIN/1.73M2
GLUCOSE SERPL-MCNC: 119 MG/DL (ref 70–100)
POTASSIUM SERPL-SCNC: 4.5 MMOL/L (ref 3.5–5.1)

## 2021-01-14 ENCOUNTER — HOSPITAL ENCOUNTER (EMERGENCY)
Facility: CLINIC | Age: 66
Discharge: HOME | End: 2021-01-14
Attending: EMERGENCY MEDICINE | Admitting: EMERGENCY MEDICINE
Payer: MEDICARE

## 2021-01-14 VITALS
RESPIRATION RATE: 20 BRPM | TEMPERATURE: 97.2 F | OXYGEN SATURATION: 97 % | HEART RATE: 95 BPM | DIASTOLIC BLOOD PRESSURE: 59 MMHG | SYSTOLIC BLOOD PRESSURE: 126 MMHG

## 2021-01-14 DIAGNOSIS — J69.0 ASPIRATION PNEUMONITIS (H): ICD-10-CM

## 2021-01-14 LAB
ALBUMIN SERPL-MCNC: 2.5 G/DL (ref 3.4–5)
ALBUMIN UR-MCNC: 30 MG/DL
ALP SERPL-CCNC: 107 U/L (ref 40–150)
ALT SERPL W P-5'-P-CCNC: 23 U/L (ref 0–70)
AMMONIA PLAS-SCNC: 38 UMOL/L (ref 10–50)
ANION GAP SERPL CALCULATED.3IONS-SCNC: 1 MMOL/L (ref 3–14)
APPEARANCE UR: CLEAR
AST SERPL W P-5'-P-CCNC: 23 U/L (ref 0–45)
BASOPHILS # BLD AUTO: 0.1 10E9/L (ref 0–0.2)
BASOPHILS NFR BLD AUTO: 0.3 %
BILIRUB SERPL-MCNC: 0.4 MG/DL (ref 0.2–1.3)
BILIRUB UR QL STRIP: NEGATIVE
BUN SERPL-MCNC: 14 MG/DL (ref 7–30)
CALCIUM SERPL-MCNC: 9.6 MG/DL (ref 8.5–10.1)
CHLORIDE SERPL-SCNC: 113 MMOL/L (ref 94–109)
CO2 SERPL-SCNC: 32 MMOL/L (ref 20–32)
COLOR UR AUTO: YELLOW
CREAT SERPL-MCNC: 0.49 MG/DL (ref 0.66–1.25)
DIFFERENTIAL METHOD BLD: ABNORMAL
EOSINOPHIL # BLD AUTO: 0 10E9/L (ref 0–0.7)
EOSINOPHIL NFR BLD AUTO: 0.1 %
ERYTHROCYTE [DISTWIDTH] IN BLOOD BY AUTOMATED COUNT: 16 % (ref 10–15)
FLUAV+FLUBV AG SPEC QL: NEGATIVE
FLUAV+FLUBV AG SPEC QL: NEGATIVE
GFR SERPL CREATININE-BSD FRML MDRD: >90 ML/MIN/{1.73_M2}
GLUCOSE SERPL-MCNC: 95 MG/DL (ref 70–99)
GLUCOSE UR STRIP-MCNC: NEGATIVE MG/DL
HCT VFR BLD AUTO: 36 % (ref 40–53)
HGB BLD-MCNC: 11.2 G/DL (ref 13.3–17.7)
HGB UR QL STRIP: NEGATIVE
IMM GRANULOCYTES # BLD: 0.1 10E9/L (ref 0–0.4)
IMM GRANULOCYTES NFR BLD: 0.6 %
KETONES UR STRIP-MCNC: 20 MG/DL
LACTATE BLD-SCNC: 1.6 MMOL/L (ref 0.7–2)
LEUKOCYTE ESTERASE UR QL STRIP: NEGATIVE
LYMPHOCYTES # BLD AUTO: 1.5 10E9/L (ref 0.8–5.3)
LYMPHOCYTES NFR BLD AUTO: 9.4 %
MCH RBC QN AUTO: 30.9 PG (ref 26.5–33)
MCHC RBC AUTO-ENTMCNC: 31.1 G/DL (ref 31.5–36.5)
MCV RBC AUTO: 99 FL (ref 78–100)
MONOCYTES # BLD AUTO: 1.6 10E9/L (ref 0–1.3)
MONOCYTES NFR BLD AUTO: 9.9 %
MUCOUS THREADS #/AREA URNS LPF: PRESENT /LPF
NEUTROPHILS # BLD AUTO: 12.7 10E9/L (ref 1.6–8.3)
NEUTROPHILS NFR BLD AUTO: 79.7 %
NITRATE UR QL: NEGATIVE
NRBC # BLD AUTO: 0 10*3/UL
NRBC BLD AUTO-RTO: 0 /100
NT-PROBNP SERPL-MCNC: 78 PG/ML (ref 0–900)
PH UR STRIP: 6 PH (ref 5–7)
PLATELET # BLD AUTO: 466 10E9/L (ref 150–450)
POTASSIUM SERPL-SCNC: 3.8 MMOL/L (ref 3.4–5.3)
PROT SERPL-MCNC: 7.4 G/DL (ref 6.8–8.8)
RBC # BLD AUTO: 3.62 10E12/L (ref 4.4–5.9)
RBC #/AREA URNS AUTO: <1 /HPF (ref 0–2)
SODIUM SERPL-SCNC: 146 MMOL/L (ref 133–144)
SOURCE: ABNORMAL
SP GR UR STRIP: 1.01 (ref 1–1.03)
SPECIMEN SOURCE: NORMAL
SQUAMOUS #/AREA URNS AUTO: <1 /HPF (ref 0–1)
TROPONIN I SERPL-MCNC: <0.015 UG/L (ref 0–0.04)
UROBILINOGEN UR STRIP-MCNC: NORMAL MG/DL (ref 0–2)
WBC # BLD AUTO: 16 10E9/L (ref 4–11)
WBC #/AREA URNS AUTO: <1 /HPF (ref 0–5)

## 2021-01-14 PROCEDURE — 80053 COMPREHEN METABOLIC PANEL: CPT | Performed by: EMERGENCY MEDICINE

## 2021-01-14 PROCEDURE — 36415 COLL VENOUS BLD VENIPUNCTURE: CPT | Performed by: EMERGENCY MEDICINE

## 2021-01-14 PROCEDURE — 82140 ASSAY OF AMMONIA: CPT | Performed by: EMERGENCY MEDICINE

## 2021-01-14 PROCEDURE — 83605 ASSAY OF LACTIC ACID: CPT | Performed by: EMERGENCY MEDICINE

## 2021-01-14 PROCEDURE — 85025 COMPLETE CBC W/AUTO DIFF WBC: CPT | Performed by: EMERGENCY MEDICINE

## 2021-01-14 PROCEDURE — 87804 INFLUENZA ASSAY W/OPTIC: CPT | Performed by: EMERGENCY MEDICINE

## 2021-01-14 PROCEDURE — 99284 EMERGENCY DEPT VISIT MOD MDM: CPT | Mod: 25

## 2021-01-14 PROCEDURE — 81001 URINALYSIS AUTO W/SCOPE: CPT | Performed by: EMERGENCY MEDICINE

## 2021-01-14 PROCEDURE — 87086 URINE CULTURE/COLONY COUNT: CPT | Performed by: EMERGENCY MEDICINE

## 2021-01-14 PROCEDURE — 93005 ELECTROCARDIOGRAM TRACING: CPT

## 2021-01-14 PROCEDURE — 250N000011 HC RX IP 250 OP 636: Performed by: EMERGENCY MEDICINE

## 2021-01-14 PROCEDURE — 83880 ASSAY OF NATRIURETIC PEPTIDE: CPT | Mod: GZ | Performed by: EMERGENCY MEDICINE

## 2021-01-14 PROCEDURE — 84484 ASSAY OF TROPONIN QUANT: CPT | Performed by: EMERGENCY MEDICINE

## 2021-01-14 PROCEDURE — 258N000003 HC RX IP 258 OP 636: Performed by: EMERGENCY MEDICINE

## 2021-01-14 PROCEDURE — 96365 THER/PROPH/DIAG IV INF INIT: CPT

## 2021-01-14 PROCEDURE — 96361 HYDRATE IV INFUSION ADD-ON: CPT

## 2021-01-14 RX ORDER — ACETAMINOPHEN 325 MG/1
650 TABLET ORAL EVERY 4 HOURS PRN
Status: ON HOLD | COMMUNITY
End: 2021-05-31

## 2021-01-14 RX ORDER — CLOTRIMAZOLE 1 %
CREAM (GRAM) TOPICAL 2 TIMES DAILY PRN
COMMUNITY

## 2021-01-14 RX ORDER — TRIAMCINOLONE ACETONIDE 1 MG/G
CREAM TOPICAL 2 TIMES DAILY PRN
COMMUNITY

## 2021-01-14 RX ORDER — LEVOCARNITINE 1 G/10ML
330 SOLUTION ORAL 3 TIMES DAILY
COMMUNITY

## 2021-01-14 RX ORDER — FERROUS SULFATE 325(65) MG
325 TABLET ORAL
COMMUNITY

## 2021-01-14 RX ORDER — LORATADINE 10 MG/1
10 TABLET ORAL DAILY PRN
COMMUNITY

## 2021-01-14 RX ORDER — DIAZEPAM 5 MG
7.5 TABLET ORAL PRN
Status: ON HOLD | COMMUNITY
End: 2021-05-31

## 2021-01-14 RX ORDER — AMPICILLIN AND SULBACTAM 2; 1 G/1; G/1
3 INJECTION, POWDER, FOR SOLUTION INTRAMUSCULAR; INTRAVENOUS ONCE
Status: COMPLETED | OUTPATIENT
Start: 2021-01-14 | End: 2021-01-14

## 2021-01-14 RX ORDER — IBUPROFEN 200 MG
600 TABLET ORAL EVERY 6 HOURS PRN
Status: ON HOLD | COMMUNITY
End: 2021-05-31

## 2021-01-14 RX ADMIN — SODIUM CHLORIDE, POTASSIUM CHLORIDE, SODIUM LACTATE AND CALCIUM CHLORIDE 1000 ML: 600; 310; 30; 20 INJECTION, SOLUTION INTRAVENOUS at 12:07

## 2021-01-14 RX ADMIN — AMPICILLIN SODIUM AND SULBACTAM SODIUM 3 G: 2; 1 INJECTION, POWDER, FOR SOLUTION INTRAMUSCULAR; INTRAVENOUS at 15:44

## 2021-01-14 ASSESSMENT — ENCOUNTER SYMPTOMS
COUGH: 1
HEMATURIA: 0
SEIZURES: 0
FATIGUE: 1
FEVER: 0
FREQUENCY: 0
DYSURIA: 0

## 2021-01-14 NOTE — ED TRIAGE NOTES
Labs drawn yesterday at [facility] in prior [place] and staff at [facility] informed that they needed to bring patient to the ED for abnormal labs.  Patient is non-verbal and uncooperative.   Here with 2 staff from [facility].   Valium 7.5mg at 10am this am.

## 2021-01-14 NOTE — ED PROVIDER NOTES
History   Chief Complaint:  Abnormal Labs       HPI limited secondary due to the patient's profound intellectual disability. HPI provided via group home staff.   HPI   Gigi Lackey is a 65 year old male with history of profound intellectual disability who presents with group home staff with abnormal labs. The patient's group home staff report that the patient was seen in clinic yesterday for a cough, fatigue, and lethargy that has been gradually worsening since the patient had COVID-19 at the end of October. The staff note that they were notified this morning of the patient's lab result from yesterday showing an elevated WBC and dehydration, prompting the patient to the ED. Group home staff state that the patient has been gagging more with eating and will cough until he vomits due to his thick productive cough. The patient is on a thickened liquids and blended diet. Group home staff also note that the patient has lost a few pounds over the past week. They state he has been drinking liquids normally though. They note that the patient used to pass a bowel movement every day in the morning, but now only does once every two days. Group staff also states that the patient received 7.5 mg PO Valium at 1000 this morning. The patient's group home staff denies fever, seizure activity, urinary changes, and other issues.  No recent falls.     Labs and Imaging Done on 1/13/2021:  Valproic Acid: 32 (L)  XR Chest 2 Views: FINDINGS:  Some slight increased density in the bases could reflect atelectasis or some degree of interstitial edema. Mid and upper portions of the lungs are clear. Heart size normal.    Review of Systems   Constitutional: Positive for fatigue. Negative for fever.   Respiratory: Positive for cough.    Genitourinary: Negative for decreased urine volume, dysuria, frequency, hematuria and urgency.   Neurological: Negative for seizures.   All other systems reviewed and are  negative.      Allergies:  Actifed  Antihistamines  Doxycycline  Morphine  Pseudoephedrine  Sympathomimetics     Medications:  Calcium antacid  Folic acid  Senna tabs  Vitamin D  Escitalopram  Ferrous sulfate  Pantoprazole  Divalproex sodium  Olanzapine    Past Medical History:    Anemia  Epilepsy  GERD  Osteoporosis  Pervasive developmental disorder  Profound intellectual disability  Acute encephalopathy  Anemia    Past Surgical History:    Ankle surgery     Family History:    The patient denies past family history.     Social History:  Patient lives at group home.   Presents to the ED with group home staff.     Physical Exam     Patient Vitals for the past 24 hrs:   BP Temp Temp src Pulse Resp SpO2   01/14/21 1645 126/59 -- -- 95 -- 97 %   01/14/21 1630 127/58 -- -- 92 -- 97 %   01/14/21 1615 129/64 -- -- 100 -- 97 %   01/14/21 1600 109/52 -- -- 91 -- 94 %   01/14/21 1545 112/61 -- -- 97 -- 98 %   01/14/21 1530 92/64 -- -- 92 -- 97 %   01/14/21 1515 117/68 -- -- 96 -- 98 %   01/14/21 1500 -- -- -- 90 -- 97 %   01/14/21 1445 -- -- -- 87 -- 96 %   01/14/21 1430 -- -- -- 86 -- 97 %   01/14/21 1415 111/69 -- -- 95 -- 97 %   01/14/21 1400 (!) 152/90 -- -- 93 -- 98 %   01/14/21 1300 131/64 -- -- 84 -- 97 %   01/14/21 1221 -- 97.2  F (36.2  C) Temporal -- -- --   01/14/21 1141 -- -- -- -- 20 --   01/14/21 1130 95/69 -- -- 101 -- 98 %      Physical Exam  General: Resting on the bed.  Head: No obvious trauma to head.  Ears, Nose, Throat:  External ears normal.  Nose normal.  Dry MM  Eyes:  Conjunctivae clear.  Right pupil is equal, round, and reactive. left ocular deformity at baseline.    Neck: Normal range of motion.  Neck supple. no nuchal rigidity   CV: Regular rate and rhythm.  No murmurs.      Respiratory: Effort normal and breath sounds normal.  No wheezing or crackles.   Gastrointestinal: Soft.  No distension. There is no tenderness.    Musculoskeletal: Non tender non edematous calves  Neuro: Alert. Non verbal.   Combative with right arm.  Moves bilateral lower extremities   Skin: Skin is warm and dry.  No rash noted.     Emergency Department Course   ECG (16:43:02):  Rate 105 bpm. LA interval 126. QRS duration 74. QT/QTc 346/457. P-R-T axes 62 -40 57. Sinus tachycardia. Left axis deviation. Low voltage QRS. Possible Inferior infarct, age undetermined. Abnormal ECG. Interpreted at 1648 by Chantel Baez MD.       Laboratory:  Influenza A/B antigen: Negative     Ammonia: 38   CBC: WBC 16.0 (H), HGB 11.2 (L),  (H)  CMP: Creatinine 0.49 (L), Na 146 (H), Chloride 113 (H, Anion Gap 1 (L), Albumin 2.5 (H)   Lactic acid whole blood (1140): 1.6  Troponin (1513): <0.015   Nt probnp inpatient: 78    UA: Urineketon 20, Mucous Present, o/w Negative  Urine culture: Pending        Emergency Department Course:    Reviewed:  I reviewed nursing notes, vitals and past medical history    Assessments:  1105: I initially evaluated the patient in ED room 32.    1633: I reassessed the patient and updated group home staff on plan.     Consults:  1113: I left a voice message for the patient's legal guardian updating them on the patient's presentation.     1623: I spoke with the patient's legal guardian. ([phone]). She prefers the patient to go home at this time.     Interventions:  1207, NS 1L IV Bolus   1544, Unasyn, 3 g, IV    Disposition:  The patient was discharged to home.       Impression & Plan   Medical Decision Makin-year-old male with history of developmental delay, seizure disorder presents with abnormal labs.  Vital signs are reassuring.  Broad differential was pursued including not limited to aspiration pneumonia, severe sepsis, septic shock, electrolyte, metabolic, renal dysfunction, dehydration, urinary tract infection, CHF, ACS, arrhythmia, influenza, etc.  Patient is already had COVID-19 within the last 90 days making COVID-19 unlikely.  Influenza swab negative.  CBC does show white count of 16 and mild  anemia.  Lactate is fortunately normal not concerning for severe sepsis or septic shock.  Ammonia is normal not concerning for toxic encephalopathy.  BNP is normal not suggestive of acute CHF.  EKG shows sinus rhythm, no evidence of acute ST-T wave change.  No evidence of ischemia.  No evidence of arrhythmia.  Troponin is negative.  ACS is unlikely.  UA is unremarkable not suggestive of acute UTI.  Reviewed chest x-ray from yesterday shows possible inflammation bilateral bases.  Given the patient is coughing, more fatigued than usual, has elevated white count, I am suspicious for aspiration pneumonia.  I did have a lengthy discussion with patient's guardian given that patient chronically aspirates, itis unlikely that the hospital stay will reverse this process.  Patient diego prefers he go home.  He is more comfortable at home.  At this point his oxygen and his vital signs are stable.  They would prefer to try treatment at home and if he worsens to bring him back to the ER.  This seems reasonable.  Patient left the ER clapping and was very happy.  This seems in the best interest of the patient at this time.  Patient was discharged home.    Diagnosis:    ICD-10-CM    1. Aspiration pneumonitis (H)  J69.0 Urine Culture       Discharge Medications:  Discharge Medication List as of 1/14/2021  4:50 PM      START taking these medications    Details   amoxicillin-clavulanate (AUGMENTIN) 875-125 MG tablet Take 1 tablet by mouth 2 times daily for 7 days, Disp-14 tablet, R-0, E-Prescribe             Scribe Disclosure:  Jimbo SOLOMON, am serving as a scribe at 11:03 AM on 1/14/2021 to document services personally performed by Chantel Baez MD based on my observations and the provider's statements to me.     [facility]  January 14, 2021      Chantel Baez MD  01/14/21 2279

## 2021-01-14 NOTE — ED AVS SNAPSHOT
[facility] Emergency Dept  [address]  [facility] [address]  Phone: [phone]  Fax: [phone]                                    Gigi Lackey   MRN: [id number]    Department: [facility] Emergency Dept   Date of Visit: 1/14/2021           After Visit Summary Signature Page    I have received my discharge instructions, and my questions have been answered. I have discussed any challenges I see with this plan with the nurse or doctor.    ..........................................................................................................................................  Patient/Patient Representative Signature      ..........................................................................................................................................  Patient Representative Print Name and Relationship to Patient    ..................................................               ................................................  Date                                   Time    ..........................................................................................................................................  Reviewed by Signature/Title    ...................................................              ..............................................  Date                                               Time          22EPIC Rev 08/18

## 2021-01-14 NOTE — DISCHARGE INSTRUCTIONS
These follow-up with primary care doctor in 2 to 3 days.  If breathing gets worse, increased shortness of breath, inability to eat or drink normally, not making wet diapers, return to the ER.  He got a dose of IV antibiotics in the ER therefore can start his oral antibiotics in the morning.    Discharge Instructions  Bronchitis, Pneumonia, Bronchospasm    You were seen today for a chest infection or inflammation. If your provider decided this was due to a bacterial infection, you may need an antibiotic. Sometimes these are caused by a virus, and then an antibiotic will not help.     Generally, every Emergency Department visit should have a follow-up clinic visit with either a primary or a specialty clinic/provider. Please follow-up as instructed by your emergency provider today.    Return to the Emergency Department if:  Your breathing gets much worse.  You are very weak, or feel much more ill.  You develop new symptoms, such as chest pain.  You cough up blood.  You are vomiting (throwing up) enough that you cannot keep fluids or your medicine down.    What can I do to help myself?  Fill any prescriptions the provider gave you and take them right away--especially antibiotics. Be sure to finish the whole antibiotic prescription.  You may be given a prescription for an inhaler, which can help loosen tight air passages.  Use this as needed, but not more often than directed. Inhalers work much better when used with a spacer.   You may be given a prescription for a steroid to reduce inflammation. Used long-term, these can have side effects, but for short-term use they are safe. You may notice restlessness or increased appetite.      You may use non-prescription cough or cold medicines. Cough medicines may help, but don t make the cough go away completely.   Avoid smoke, because this can make your symptoms worse. If you smoke, this may be a good time to quit! Consider using nicotine lozenges, gum, or patches to reduce  cravings.   If you have a fever, Tylenol  (acetaminophen), Motrin  (ibuprofen), or Advil  (ibuprofen) may help bring fever down and may help you feel more comfortable. Be sure to read and follow the package directions, and ask your provider if you have questions.  Be sure to get your flu shot each year.  For certain ages, the pneumonia shot can help prevent pneumonia.  If you were given a prescription for medicine here today, be sure to read all of the information (including the package insert) that comes with your prescription.  This will include important information about the medicine, its side effects, and any warnings that you need to know about.  The pharmacist who fills the prescription can provide more information and answer questions you may have about the medicine.  If you have questions or concerns that the pharmacist cannot address, please call or return to the Emergency Department.     Remember that you can always come back to the Emergency Department if you are not able to see your regular provider in the amount of time listed above, if you get any new symptoms, or if there is anything that worries you.

## 2021-01-14 NOTE — PHARMACY-ADMISSION MEDICATION HISTORY
Admission medication history interview status for this patient is complete. See [facility] admission navigator for allergy information, prior to admission medications and immunization status.     Medication history interview done via telephone during Covid-19 pandemic, indicate source(s): Group home staff present in ED  Medication history resources (including written lists, pill bottles, clinic record): [facility] [phone]  Pharmacy:     Changes made to PTA medication list:  Added: Calcium-Vit D; Clotrimazole cream; Diazepam; Ferrous sulfate; Ibuprofen; Levocarnitine; Loratadine; Triamcinolone cream;  Deleted: Calcium (Tums); Docusate; Naproxen; Vit D  Changed: None    Actions taken by pharmacist (provider contacted, etc):None     Additional medication history information:None    Medication reconciliation/reorder completed by provider prior to medication history?  No    Prior to Admission medications    Medication Sig Last Dose Taking? Auth Provider   acetaminophen (ACETAMINOPHEN 8 HOUR) 650 MG CR tablet Take 1,300 mg by mouth 2 times daily 1/14/2021 at 0800 Yes Unknown, Entered By History   acetaminophen (TYLENOL) 325 MG tablet Take 650 mg by mouth every 4 hours as needed for fever or pain  Yes Unknown, Entered By History   calcium carbonate-vitamin D (OS-CHAUNCEY) 500-400 MG-UNIT tablet Take 1 tablet by mouth 2 times daily 1/14/2021 at x1 Yes Unknown, Entered By History   carbamide peroxide (DEBROX) 6.5 % otic solution Place 4 drops into both ears Daily for 5 days of each month. Begin on the first Sunday of each month. Past Month at Unknown time Yes Reported, Patient   clotrimazole (LOTRIMIN) 1 % external cream Apply topically 2 times daily as needed  Yes Unknown, Entered By History   diazepam (VALIUM) 5 MG tablet Take 7.5 mg by mouth as needed (90 minutes prior to medical appointments) 1/14/2021 at 1000 Yes Unknown, Entered By History   divalproex sodium delayed-release (DEPAKOTE) 250 MG DR tablet  Take 1 tablet (250 mg) by mouth 2 times daily 1/14/2021 at x1 Yes Ted Clinton MD   Escitalopram Oxalate (LEXAPRO PO) Take 15 mg by mouth daily  1/14/2021 at AM Yes Reported, Patient   ferrous sulfate (FEROSUL) 325 (65 Fe) MG tablet Take 325 mg by mouth three times a week (Monday, Wednesday, Friday) 1/13/2021 at AM Yes Unknown, Entered By History   folic acid (FOLVITE) 1 MG tablet Take 1 mg by mouth daily 1/14/2021 at aM Yes Unknown, Entered By History   ibuprofen (ADVIL/MOTRIN) 200 MG tablet Take 600 mg by mouth every 6 hours as needed for fever or pain  Yes Unknown, Entered By History   levOCARNitine (CARNITOR) 1 GM/10ML solution Take 330 mg by mouth 3 times daily 1/14/2021 at x1 Yes Unknown, Entered By History   loratadine (CLARITIN) 10 MG tablet Take 10 mg by mouth daily as needed for allergies  Yes Unknown, Entered By History   mirtazapine (REMERON) 15 MG tablet Take 15 mg by mouth At Bedtime 1/13/2021 at HS Yes Unknown, Entered By History   multivitamin w/minerals (MULTI-VITAMIN) tablet Take 1 tablet by mouth daily 1/14/2021 at AM Yes Unknown, Entered By History   OLANZapine (ZYPREXA) 15 MG tablet Take 15 mg by mouth At Bedtime  1/13/2021 at HS Yes Reported, Patient   pantoprazole (PROTONIX) 40 MG EC tablet Take 40 mg by mouth 2 times daily 1/14/2021 at x1 Yes Unknown, Entered By History   psyllium (METAMUCIL/KONSYL) 58.6 % powder Take 1 Tablespoonful by mouth every morning 1/14/2021 at AM Yes Unknown, Entered By History   senna (SENOKOT) 8.6 MG tablet Take 1 tablet by mouth daily 1/14/2021 at AM Yes Unknown, Entered By History   triamcinolone (KENALOG) 0.1 % external cream Apply topically 2 times daily as needed for irritation  Yes Unknown, Entered By History       Reported, Patient

## 2021-01-15 ENCOUNTER — HEALTH MAINTENANCE LETTER (OUTPATIENT)
Age: 66
End: 2021-01-15

## 2021-01-15 LAB
BACTERIA SPEC CULT: NO GROWTH
INTERPRETATION ECG - MUSE: NORMAL
Lab: NORMAL
SPECIMEN SOURCE: NORMAL

## 2021-01-15 NOTE — RESULT ENCOUNTER NOTE
Emergency Dept/Urgent Care discharge antibiotic (if prescribed): Amoxicillin-Clavulanate (Augmentin) 875-125 mg PO tablet, 1 tablet by mouth 2 times daily for 7 days  Date of Rx (if applicable):  1/14/21  No changes in treatment per Urine culture protocol.

## 2021-05-29 ENCOUNTER — APPOINTMENT (OUTPATIENT)
Dept: CT IMAGING | Facility: CLINIC | Age: 66
DRG: 480 | End: 2021-05-29
Attending: PHYSICIAN ASSISTANT
Payer: MEDICARE

## 2021-05-29 ENCOUNTER — APPOINTMENT (OUTPATIENT)
Dept: GENERAL RADIOLOGY | Facility: CLINIC | Age: 66
DRG: 480 | End: 2021-05-29
Attending: PHYSICIAN ASSISTANT
Payer: MEDICARE

## 2021-05-29 ENCOUNTER — HOSPITAL ENCOUNTER (INPATIENT)
Facility: CLINIC | Age: 66
LOS: 3 days | Discharge: NURSING, REHAB OR LONG-TERM CARE | DRG: 480 | End: 2021-06-01
Attending: PHYSICIAN ASSISTANT | Admitting: INTERNAL MEDICINE
Payer: MEDICARE
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
## 2021-05-29 DIAGNOSIS — S72.142A INTERTROCHANTERIC FRACTURE OF LEFT FEMUR, CLOSED, INITIAL ENCOUNTER (H): Primary | ICD-10-CM

## 2021-05-29 DIAGNOSIS — S72.91XA RIGHT FEMORAL FRACTURE (H): ICD-10-CM

## 2021-05-29 DIAGNOSIS — S12.100A CLOSED ODONTOID FRACTURE, INITIAL ENCOUNTER (H): ICD-10-CM

## 2021-05-29 DIAGNOSIS — S72.141A CLOSED DISPLACED INTERTROCHANTERIC FRACTURE OF RIGHT FEMUR, INITIAL ENCOUNTER (H): ICD-10-CM

## 2021-05-29 LAB
ABO + RH BLD: NORMAL
ABO + RH BLD: NORMAL
ANION GAP SERPL CALCULATED.3IONS-SCNC: 3 MMOL/L (ref 3–14)
APTT PPP: 50 SEC (ref 22–37)
BASOPHILS # BLD AUTO: 0 10E9/L (ref 0–0.2)
BASOPHILS NFR BLD AUTO: 0.3 %
BLD GP AB SCN SERPL QL: NORMAL
BLOOD BANK CMNT PATIENT-IMP: NORMAL
BUN SERPL-MCNC: 16 MG/DL (ref 7–30)
CALCIUM SERPL-MCNC: 9.3 MG/DL (ref 8.5–10.1)
CHLORIDE SERPL-SCNC: 107 MMOL/L (ref 94–109)
CO2 SERPL-SCNC: 31 MMOL/L (ref 20–32)
CREAT SERPL-MCNC: 0.6 MG/DL (ref 0.66–1.25)
DIFFERENTIAL METHOD BLD: ABNORMAL
EOSINOPHIL # BLD AUTO: 0 10E9/L (ref 0–0.7)
EOSINOPHIL NFR BLD AUTO: 0 %
ERYTHROCYTE [DISTWIDTH] IN BLOOD BY AUTOMATED COUNT: 13.5 % (ref 10–15)
GFR SERPL CREATININE-BSD FRML MDRD: >90 ML/MIN/{1.73_M2}
GLUCOSE SERPL-MCNC: 110 MG/DL (ref 70–99)
HCT VFR BLD AUTO: 30.3 % (ref 40–53)
HGB BLD-MCNC: 9.4 G/DL (ref 13.3–17.7)
IMM GRANULOCYTES # BLD: 0.1 10E9/L (ref 0–0.4)
IMM GRANULOCYTES NFR BLD: 0.5 %
INR PPP: 1.23 (ref 0.86–1.14)
LABORATORY COMMENT REPORT: NORMAL
LYMPHOCYTES # BLD AUTO: 0.8 10E9/L (ref 0.8–5.3)
LYMPHOCYTES NFR BLD AUTO: 5.9 %
MCH RBC QN AUTO: 31.5 PG (ref 26.5–33)
MCHC RBC AUTO-ENTMCNC: 31 G/DL (ref 31.5–36.5)
MCV RBC AUTO: 102 FL (ref 78–100)
MONOCYTES # BLD AUTO: 1.1 10E9/L (ref 0–1.3)
MONOCYTES NFR BLD AUTO: 8.1 %
NEUTROPHILS # BLD AUTO: 11.3 10E9/L (ref 1.6–8.3)
NEUTROPHILS NFR BLD AUTO: 85.2 %
NRBC # BLD AUTO: 0 10*3/UL
NRBC BLD AUTO-RTO: 0 /100
PLATELET # BLD AUTO: 440 10E9/L (ref 150–450)
POTASSIUM SERPL-SCNC: 3.9 MMOL/L (ref 3.4–5.3)
RBC # BLD AUTO: 2.98 10E12/L (ref 4.4–5.9)
SARS-COV-2 RNA RESP QL NAA+PROBE: NEGATIVE
SODIUM SERPL-SCNC: 141 MMOL/L (ref 133–144)
SPECIMEN EXP DATE BLD: NORMAL
SPECIMEN SOURCE: NORMAL
VALPROATE SERPL-MCNC: 27 MG/L (ref 50–100)
WBC # BLD AUTO: 13.2 10E9/L (ref 4–11)

## 2021-05-29 PROCEDURE — 86850 RBC ANTIBODY SCREEN: CPT | Performed by: PHYSICIAN ASSISTANT

## 2021-05-29 PROCEDURE — 73620 X-RAY EXAM OF FOOT: CPT | Mod: RT

## 2021-05-29 PROCEDURE — 73590 X-RAY EXAM OF LOWER LEG: CPT | Mod: RT

## 2021-05-29 PROCEDURE — 250N000011 HC RX IP 250 OP 636: Performed by: PHYSICIAN ASSISTANT

## 2021-05-29 PROCEDURE — 86901 BLOOD TYPING SEROLOGIC RH(D): CPT | Performed by: PHYSICIAN ASSISTANT

## 2021-05-29 PROCEDURE — 99223 1ST HOSP IP/OBS HIGH 75: CPT | Mod: AI | Performed by: PHYSICIAN ASSISTANT

## 2021-05-29 PROCEDURE — 120N000001 HC R&B MED SURG/OB

## 2021-05-29 PROCEDURE — 70450 CT HEAD/BRAIN W/O DYE: CPT | Mod: ME

## 2021-05-29 PROCEDURE — 80048 BASIC METABOLIC PNL TOTAL CA: CPT | Performed by: PHYSICIAN ASSISTANT

## 2021-05-29 PROCEDURE — 99285 EMERGENCY DEPT VISIT HI MDM: CPT | Mod: 25

## 2021-05-29 PROCEDURE — 72125 CT NECK SPINE W/O DYE: CPT | Mod: ME

## 2021-05-29 PROCEDURE — 85025 COMPLETE CBC W/AUTO DIFF WBC: CPT | Performed by: PHYSICIAN ASSISTANT

## 2021-05-29 PROCEDURE — 86900 BLOOD TYPING SEROLOGIC ABO: CPT | Performed by: PHYSICIAN ASSISTANT

## 2021-05-29 PROCEDURE — 85730 THROMBOPLASTIN TIME PARTIAL: CPT | Performed by: PHYSICIAN ASSISTANT

## 2021-05-29 PROCEDURE — 85610 PROTHROMBIN TIME: CPT | Performed by: PHYSICIAN ASSISTANT

## 2021-05-29 PROCEDURE — 87635 SARS-COV-2 COVID-19 AMP PRB: CPT | Performed by: PHYSICIAN ASSISTANT

## 2021-05-29 PROCEDURE — 80164 ASSAY DIPROPYLACETIC ACD TOT: CPT | Performed by: PHYSICIAN ASSISTANT

## 2021-05-29 PROCEDURE — 93005 ELECTROCARDIOGRAM TRACING: CPT

## 2021-05-29 PROCEDURE — 73502 X-RAY EXAM HIP UNI 2-3 VIEWS: CPT

## 2021-05-29 PROCEDURE — C9803 HOPD COVID-19 SPEC COLLECT: HCPCS

## 2021-05-29 PROCEDURE — 250N000013 HC RX MED GY IP 250 OP 250 PS 637: Performed by: PHYSICIAN ASSISTANT

## 2021-05-29 RX ORDER — FENTANYL CITRATE 50 UG/ML
50 INJECTION, SOLUTION INTRAMUSCULAR; INTRAVENOUS ONCE
Status: DISCONTINUED | OUTPATIENT
Start: 2021-05-29 | End: 2021-05-29

## 2021-05-29 RX ORDER — NALOXONE HYDROCHLORIDE 0.4 MG/ML
0.4 INJECTION, SOLUTION INTRAMUSCULAR; INTRAVENOUS; SUBCUTANEOUS
Status: DISCONTINUED | OUTPATIENT
Start: 2021-05-29 | End: 2021-06-01 | Stop reason: HOSPADM

## 2021-05-29 RX ORDER — NALOXONE HYDROCHLORIDE 0.4 MG/ML
0.2 INJECTION, SOLUTION INTRAMUSCULAR; INTRAVENOUS; SUBCUTANEOUS
Status: DISCONTINUED | OUTPATIENT
Start: 2021-05-29 | End: 2021-06-01 | Stop reason: HOSPADM

## 2021-05-29 RX ORDER — OLANZAPINE 5 MG/1
5 TABLET, ORALLY DISINTEGRATING ORAL EVERY 6 HOURS PRN
Status: DISCONTINUED | OUTPATIENT
Start: 2021-05-29 | End: 2021-06-01 | Stop reason: HOSPADM

## 2021-05-29 RX ORDER — SODIUM CHLORIDE, SODIUM LACTATE, POTASSIUM CHLORIDE, CALCIUM CHLORIDE 600; 310; 30; 20 MG/100ML; MG/100ML; MG/100ML; MG/100ML
INJECTION, SOLUTION INTRAVENOUS CONTINUOUS
Status: ACTIVE | OUTPATIENT
Start: 2021-05-29 | End: 2021-05-30

## 2021-05-29 RX ORDER — OXYCODONE HYDROCHLORIDE 5 MG/1
5-10 TABLET ORAL EVERY 4 HOURS PRN
Status: DISCONTINUED | OUTPATIENT
Start: 2021-05-29 | End: 2021-05-30

## 2021-05-29 RX ORDER — OLANZAPINE 5 MG/1
15 TABLET ORAL AT BEDTIME
Status: DISCONTINUED | OUTPATIENT
Start: 2021-05-29 | End: 2021-06-01 | Stop reason: HOSPADM

## 2021-05-29 RX ORDER — METHYLPREDNISOLONE SODIUM SUCCINATE 125 MG/2ML
125 INJECTION, POWDER, LYOPHILIZED, FOR SOLUTION INTRAMUSCULAR; INTRAVENOUS
Status: DISCONTINUED | OUTPATIENT
Start: 2021-05-29 | End: 2021-06-01 | Stop reason: HOSPADM

## 2021-05-29 RX ORDER — LIDOCAINE 40 MG/G
CREAM TOPICAL
Status: DISCONTINUED | OUTPATIENT
Start: 2021-05-29 | End: 2021-05-30

## 2021-05-29 RX ORDER — HYDROMORPHONE HYDROCHLORIDE 1 MG/ML
.3-.5 INJECTION, SOLUTION INTRAMUSCULAR; INTRAVENOUS; SUBCUTANEOUS EVERY 4 HOURS PRN
Status: CANCELLED | OUTPATIENT
Start: 2021-05-29

## 2021-05-29 RX ORDER — ACETAMINOPHEN 325 MG/1
975 TABLET ORAL EVERY 8 HOURS
Status: DISCONTINUED | OUTPATIENT
Start: 2021-05-29 | End: 2021-05-31

## 2021-05-29 RX ORDER — DIVALPROEX SODIUM 250 MG/1
250 TABLET, DELAYED RELEASE ORAL 2 TIMES DAILY
Status: DISCONTINUED | OUTPATIENT
Start: 2021-05-29 | End: 2021-05-30

## 2021-05-29 RX ORDER — OXYCODONE HYDROCHLORIDE 5 MG/1
5 TABLET ORAL ONCE
Status: COMPLETED | OUTPATIENT
Start: 2021-05-29 | End: 2021-05-29

## 2021-05-29 RX ORDER — HALOPERIDOL 5 MG/ML
1-2 INJECTION INTRAMUSCULAR EVERY 6 HOURS PRN
Status: DISCONTINUED | OUTPATIENT
Start: 2021-05-29 | End: 2021-06-01 | Stop reason: HOSPADM

## 2021-05-29 RX ORDER — HYDROMORPHONE HYDROCHLORIDE 1 MG/ML
0.3 INJECTION, SOLUTION INTRAMUSCULAR; INTRAVENOUS; SUBCUTANEOUS EVERY 4 HOURS PRN
Status: DISCONTINUED | OUTPATIENT
Start: 2021-05-29 | End: 2021-05-30

## 2021-05-29 RX ORDER — DIPHENHYDRAMINE HYDROCHLORIDE 50 MG/ML
50 INJECTION INTRAMUSCULAR; INTRAVENOUS
Status: DISCONTINUED | OUTPATIENT
Start: 2021-05-29 | End: 2021-06-01 | Stop reason: HOSPADM

## 2021-05-29 RX ADMIN — OXYCODONE HYDROCHLORIDE 5 MG: 5 TABLET ORAL at 18:25

## 2021-05-29 RX ADMIN — MIDAZOLAM HYDROCHLORIDE 7.5 MG: 5 INJECTION, SOLUTION INTRAMUSCULAR; INTRAVENOUS at 21:35

## 2021-05-29 ASSESSMENT — ENCOUNTER SYMPTOMS
ARTHRALGIAS: 1
AGITATION: 1

## 2021-05-29 NOTE — ED PROVIDER NOTES
History   Chief Complaint:  Ankle Pain      Splint was placed    The history is limited by a language barrier (patient nonverbal).      Gigi Lackey is a 65 year old male with a history of anemia, epilepsy, and osteoporosis who presents via EMS for evaluation of right lower extremity pain after fall that occurred approximately 6 hours prior to arrival. Of note, the patient is nonverbal at baseline, and unable to provide his own history. Staff member from [facility] reports that they were walking outside with the patient and his gait belt, when patient lost his footing and fell onto the concrete. The patient reportedly hit his right ear as well as right leg, and was unable to stand up afterwards. EMS did place a splint to his lower leg, did observe some possible shortening and rotation to his right extremity. Staff has observed the patient to act more agitated than usual, this is typical when he is in pain. Tylenol was given 2 hours prior to arrival.     Review of Systems   Unable to perform ROS: Patient nonverbal   HENT: Positive for ear pain (R ear).    Musculoskeletal: Positive for arthralgias (RLE) and gait problem (secondary to injury).   Psychiatric/Behavioral: Positive for agitation and behavioral problems (nonverbal ).   All other systems reviewed and are negative.    Allergies:  Actifed  Antihistamines  Doxycycline  Morphine  Triprolidine-Pseudoephedrine  Sympathomimetics   Ethanolamine  Antipyrine-Benzocaine     Medications:  Calcium antacid  Folic acid  Senna tabs  Vitamin D  Escitalopram  Ferrous sulfate  Pantoprazole  Divalproex sodium  Olanzapine     Past Medical History:    Anemia  Epilepsy  GERD  Osteoporosis  Pervasive developmental disorder  Profound intellectual disability  Acute encephalopathy  Anemia     Past Surgical History:    Ankle surgery   Left eye enucleation  Right cataract extraction    Social History:  The patient was accompanied to the ED by EMS. Later accompanied by  caregivers.  PCP: Jimbo Willis  The patient resides in a group home    Physical Exam     Patient Vitals for the past 24 hrs:   BP Temp Temp src Pulse Resp SpO2 Weight   05/29/21 2230 -- -- -- -- -- 95 % --   05/29/21 2225 -- -- -- -- -- 92 % --   05/29/21 2220 -- -- -- -- -- 100 % --   05/29/21 2215 -- -- -- -- -- 100 % --   05/29/21 2210 -- -- -- -- -- 97 % --   05/29/21 2155 -- -- -- -- -- 100 % --   05/29/21 2150 -- -- -- -- -- 99 % --   05/29/21 2145 -- -- -- -- -- 91 % --   05/29/21 2140 -- -- -- -- -- 91 % --   05/29/21 2135 116/44 -- -- 92 -- -- --   05/29/21 2119 -- -- -- -- -- -- 49.4 kg (109 lb)   05/29/21 1745 (!) 163/94 97.3  F (36.3  C) Temporal 90 20 -- --       Physical Exam  General: Lying on gurney.  Mildly agitated. Groaning intermittently.  Head:  Scalp is NC/AT without bruising, hematomas.  Eyes:  No scleral icterus, PERRL, EOMI   ENT:  The external nose and ears are normal.    Abrasion to left external pinna.  No deeper laceration.TM's normal bilaterally    No bruising or facial bone tenderness.    The oropharynx is normal without evidence of dental trauma or intraoral lacerations.  Neck:  Normal range of motion without rigidity. Able to rotate 45 degrees BL.  CV:  Regular rate and rhythm    No pathologic murmur, rubs, or gallops.  Resp:  Breath sounds are clear bilaterally.  No stridor in neck.    Non-labored, no retractions or accessory muscle use  Abdomen: Abdomen is soft, no distension, no tenderness, no masses.    MS:  No midline cervical, thoracic, or lumbar tenderness    No tenderness over sternum, scapula, ribs, clavicles.    Pain with any ROM of right hip, held in slight rotation.  No apparent ttp or bruising of right knee, ankle, foot, toes. No swelling or ttp over right great toe.    PROM of all other major joints performed and unremarkable.  Skin:  Warm and dry, No bruising.  Neuro: Nonverbal (Baseline per staff).  No facial asymmetry. Right upper extremities held in flexion  (Baseline per staff).  Otherwise moves all extremities.    Psych: Agitated.  Awake responds to painful stimuli/    Emergency Department Course     ECG:  Completed at 2007. Read at 2009 by Dr. Yu.   Normal sinus rhythm  Inferior infarct, age undetermined   Rate 89 bpm. NJ interval 128. QRS duration 84. QT/QTc 362/440. P-R-T axes 54 -24 40.  Agree with computer interpretation.     Imaging:  Cervical spine CT w/o contrast:  1.  Fracture at the base of the dens with cortication of fracture fragments suggesting chronicity.  2.  No definite acute fracture.  3.  Degenerative changes, as described.  Report per radiology.    Head CT w/o contrast:  1.  No acute intracranial process.  Report per radiology.    XR Tibia & Fibula Right 2 Views:  Generalized demineralization. No fracture identified in the tibia or fibula. Normal knee and ankle joint alignment. Generalized atrophy/volume loss of the musculature in the calf.  Report per radiology.    XR Foot Right 2 Views:  Generalized demineralization. No definite acute fracture. Oblique lucency overlying the lateral base of the distal phalanx in the right great toe is suspected to represent a summation shadow. Normal joint alignment with no acute subluxation. Moderate degenerative arthritic changes in the ankle/tibiotalar joint. Fusion of the subtalar joint, either postsurgical or developmental, or due to relative disuse. Arthrodesis of the first TMT joint and between the first and second metatarsal bases with screws. No hardware loosening or complication.  Report per radiology.    XR Pelvis w Hip Right 1 View:  Acute right femoral fracture, involving the basicervical femoral neck, likely extending into the greater trochanter. Possible but not definite extension into the lesser trochanter. Moderate fracture angulation, no significant fracture displacement. The femoral head articular surface remains intact.  The right hip is developmentally dysplastic, but joint spacing is  maintained without significant degenerative arthritic changes. No hip subluxation or dislocation. Left hip is similarly developmentally dysplastic without significant degenerative changes. No left hip joint malalignment.  Bones are demineralized and gracile, consistent with chronic disuse. Soft tissue swelling present about the proximal right femur.  Report per radiology.    Laboratory:  CBC: WBC 13.2 (H), HGB 9.4 (L),   BMP: Glucose 110 (H), Creatinine 0.60 (L), o/w WNL    PTT: 50 (H)  INR: 1.23 (H)    Valproic acid: 27 (L)    ABO/Rh type and screen: In process on admission    Asymptomatic SARS-CoV2 (COVID-19) Virus : Negative    Procedures  None.    Emergency Department Course:    Reviewed:  I reviewed the patient's nursing notes, vitals, past medical history and care everywhere.     Assessments:  1749 I performed an exam of the patient in room 21 as documented above.  1803 Obtained additional history from the patient's care staff at [facility].  1859 I spoke with Radiology on the patient's imaging findings.  1930 Patient rechecked.  2000 Patient rechecked and updated.  2016 I spoke with Dr. Live to share service of the patient regarding plan for admission. Discussed patient's presentation and plan of care.  2056 Again spoke with Dr. Live regarding patient's plan of care.    Consults:    1954 I spoke with Sharon Morales PA-C of the Neurosurgery service regarding patient's presentation, findings, and plan of care. No collar placement needed.  2002 I consulted with Dr. Caceres of the Orthotics service regarding patient. He recommended that patient be admitted, and will meet with patient in the morning.  2024 I consulted with Melani Dietz PA-C for Dr. Eisenberg of the hospitalist service regarding patient, who agrees to admit patient to hospital in monitored medical bed.    Interventions:  1825 Oxycodone 5 mg PO    Disposition:  The patient was admitted to the hospital under the care of Dr. Eisenberg.      Impression & Plan     Medical Decision Making:  Gigi Lackey is a 65 year old male who presents with refusal to move right lower extremity following mechanical fall with staff.  X-rays here demonstrate presence of right femoral trochanteric fracture.  Neurovascular status intact distally.  CT head negative, CT cervical spine shows chronic appearing dens fracture discussed with neurosurgery they agree that this appears chronic no cervical spine immobilization recommended at this time.  X-rays of the remainder of the right lower extremity unremarkable from a fracture dislocation standpoint, does show mild lucency of distal phalanx of right great toe, exam does not suggest acute fracture at this time.  Head to toe trauma exam otherwise nonconcerning.  Few superficial abrasions which do not require repair.  Tetanus is up-to-date.  Preoperative screening blood work notable for anemia with hemoglobin of 9.4, down slightly from 11.24 months ago.  No evidence of acute hemorrhage or significant blood loss on exam or signs of more serious intrathoracic or intra-abdominal injury.  White blood cell count mildly elevated likely stress-induced afebrile with no evidence of infectious process.  Coags mildly prolonged unclear significance at this time but again no active bleeding and not anticoagulated.  Discussed with Ortho will plan for repair tomorrow.  Discussed with hospitalist who agrees to accept the patient for admission.    Covid-19  Gigi Lackey was evaluated during a global COVID-19 pandemic, which necessitated consideration that the patient might be at risk for infection with the SARS-CoV-2 virus that causes COVID-19. Applicable protocols for evaluation were followed during the patient's care. COVID-19 was considered as part of the patient's evaluation. A test was obtained during this visit prior to the patient's admission.    Diagnosis:    ICD-10-CM    1. Fall, initial encounter  W19.XXXA CBC with platelets  differential     INR     Partial thromboplastin time     ABO/Rh type and screen     Basic metabolic panel     Asymptomatic SARS-CoV-2 COVID-19 Virus (Coronavirus) by PCR     Valproic acid (Depakote level)   2. Right femoral fracture (H)  S72.91XA    3. Closed odontoid fracture, initial encounter (H)  S12.100A     Chronic appearing       Scribe Disclosure:  I, Margoth Olsen, am serving as a scribe at 5:49 PM on 5/29/2021 to document services personally performed by Herber Carter PA-C based on my observations and the provider's statements to me.     Margoth Olsen  5/29/2021   [facility] EMERGENCY DEPARTMENT         Herber Carter PA-C  05/29/21 4560

## 2021-05-29 NOTE — ED NOTES
Bed: ED21  Expected date: 5/29/21  Expected time: 5:29 PM  Means of arrival: Ambulance  Comments:  BV3 65 M

## 2021-05-29 NOTE — ED TRIAGE NOTES
Fall 6 hours ago. Staff believes patient has right ankle pain. Possible shortening and rotation to right extremity. Decreased movement to right leg during triage. Non verbal at baseline. Normally able to ambulate

## 2021-05-30 ENCOUNTER — ANESTHESIA (OUTPATIENT)
Dept: SURGERY | Facility: CLINIC | Age: 66
DRG: 480 | End: 2021-05-30
Payer: MEDICARE

## 2021-05-30 ENCOUNTER — APPOINTMENT (OUTPATIENT)
Dept: GENERAL RADIOLOGY | Facility: CLINIC | Age: 66
DRG: 480 | End: 2021-05-30
Attending: ORTHOPAEDIC SURGERY
Payer: MEDICARE

## 2021-05-30 ENCOUNTER — ANESTHESIA EVENT (OUTPATIENT)
Dept: SURGERY | Facility: CLINIC | Age: 66
DRG: 480 | End: 2021-05-30
Payer: MEDICARE

## 2021-05-30 ENCOUNTER — APPOINTMENT (OUTPATIENT)
Dept: GENERAL RADIOLOGY | Facility: CLINIC | Age: 66
DRG: 480 | End: 2021-05-30
Attending: PHYSICIAN ASSISTANT
Payer: MEDICARE

## 2021-05-30 PROBLEM — S72.141A CLOSED INTERTROCHANTERIC FRACTURE OF RIGHT FEMUR (H): Status: ACTIVE | Noted: 2021-05-30

## 2021-05-30 LAB
CREAT SERPL-MCNC: 0.54 MG/DL (ref 0.66–1.25)
GFR SERPL CREATININE-BSD FRML MDRD: >90 ML/MIN/{1.73_M2}
GLUCOSE BLDC GLUCOMTR-MCNC: 135 MG/DL (ref 70–99)
HGB BLD-MCNC: 9.2 G/DL (ref 13.3–17.7)

## 2021-05-30 PROCEDURE — 71045 X-RAY EXAM CHEST 1 VIEW: CPT

## 2021-05-30 PROCEDURE — 250N000011 HC RX IP 250 OP 636: Performed by: ANESTHESIOLOGY

## 2021-05-30 PROCEDURE — 250N000011 HC RX IP 250 OP 636: Performed by: INTERNAL MEDICINE

## 2021-05-30 PROCEDURE — 85018 HEMOGLOBIN: CPT | Performed by: PHYSICIAN ASSISTANT

## 2021-05-30 PROCEDURE — 250N000009 HC RX 250: Performed by: ORTHOPAEDIC SURGERY

## 2021-05-30 PROCEDURE — 250N000009 HC RX 250: Performed by: NURSE ANESTHETIST, CERTIFIED REGISTERED

## 2021-05-30 PROCEDURE — 250N000011 HC RX IP 250 OP 636: Performed by: HOSPITALIST

## 2021-05-30 PROCEDURE — 99233 SBSQ HOSP IP/OBS HIGH 50: CPT | Performed by: HOSPITALIST

## 2021-05-30 PROCEDURE — 250N000011 HC RX IP 250 OP 636: Performed by: ORTHOPAEDIC SURGERY

## 2021-05-30 PROCEDURE — 258N000003 HC RX IP 258 OP 636: Performed by: ORTHOPAEDIC SURGERY

## 2021-05-30 PROCEDURE — 258N000003 HC RX IP 258 OP 636: Performed by: HOSPITALIST

## 2021-05-30 PROCEDURE — 250N000011 HC RX IP 250 OP 636: Performed by: PHYSICIAN ASSISTANT

## 2021-05-30 PROCEDURE — 82565 ASSAY OF CREATININE: CPT | Performed by: INTERNAL MEDICINE

## 2021-05-30 PROCEDURE — 36569 INSJ PICC 5 YR+ W/O IMAGING: CPT

## 2021-05-30 PROCEDURE — 250N000011 HC RX IP 250 OP 636: Performed by: NURSE ANESTHETIST, CERTIFIED REGISTERED

## 2021-05-30 PROCEDURE — 258N000003 HC RX IP 258 OP 636: Performed by: PHYSICIAN ASSISTANT

## 2021-05-30 PROCEDURE — 999N001017 HC STATISTIC GLUCOSE BY METER IP

## 2021-05-30 PROCEDURE — 999N000141 HC STATISTIC PRE-PROCEDURE NURSING ASSESSMENT: Performed by: ORTHOPAEDIC SURGERY

## 2021-05-30 PROCEDURE — 272N000433 HC KIT CATH IV 18 OR 20G CM, POWERGLIDE W MAX BARRIER

## 2021-05-30 PROCEDURE — C1769 GUIDE WIRE: HCPCS | Performed by: ORTHOPAEDIC SURGERY

## 2021-05-30 PROCEDURE — 0QS604Z REPOSITION RIGHT UPPER FEMUR WITH INTERNAL FIXATION DEVICE, OPEN APPROACH: ICD-10-PCS | Performed by: ORTHOPAEDIC SURGERY

## 2021-05-30 PROCEDURE — C1713 ANCHOR/SCREW BN/BN,TIS/BN: HCPCS | Performed by: ORTHOPAEDIC SURGERY

## 2021-05-30 PROCEDURE — 370N000017 HC ANESTHESIA TECHNICAL FEE, PER MIN: Performed by: ORTHOPAEDIC SURGERY

## 2021-05-30 PROCEDURE — 258N000003 HC RX IP 258 OP 636: Performed by: INTERNAL MEDICINE

## 2021-05-30 PROCEDURE — 250N000009 HC RX 250: Performed by: INTERNAL MEDICINE

## 2021-05-30 PROCEDURE — 36415 COLL VENOUS BLD VENIPUNCTURE: CPT | Performed by: PHYSICIAN ASSISTANT

## 2021-05-30 PROCEDURE — 999N000179 XR SURGERY CARM FLUORO LESS THAN 5 MIN W STILLS: Mod: TC

## 2021-05-30 PROCEDURE — 360N000084 HC SURGERY LEVEL 4 W/ FLUORO, PER MIN: Performed by: ORTHOPAEDIC SURGERY

## 2021-05-30 PROCEDURE — 710N000009 HC RECOVERY PHASE 1, LEVEL 1, PER MIN: Performed by: ORTHOPAEDIC SURGERY

## 2021-05-30 PROCEDURE — 120N000001 HC R&B MED SURG/OB

## 2021-05-30 PROCEDURE — 272N000001 HC OR GENERAL SUPPLY STERILE: Performed by: ORTHOPAEDIC SURGERY

## 2021-05-30 DEVICE — IMPLANTABLE DEVICE: Type: IMPLANTABLE DEVICE | Site: HIP | Status: FUNCTIONAL

## 2021-05-30 DEVICE — IMP SCR SYN TFNA FENESTRATED LAG 80MM 04.038.180S: Type: IMPLANTABLE DEVICE | Site: HIP | Status: FUNCTIONAL

## 2021-05-30 RX ORDER — HYDROMORPHONE HCL IN WATER/PF 6 MG/30 ML
0.2 PATIENT CONTROLLED ANALGESIA SYRINGE INTRAVENOUS
Status: DISCONTINUED | OUTPATIENT
Start: 2021-05-30 | End: 2021-06-01 | Stop reason: HOSPADM

## 2021-05-30 RX ORDER — CEFAZOLIN SODIUM 2 G/100ML
2 INJECTION, SOLUTION INTRAVENOUS SEE ADMIN INSTRUCTIONS
Status: DISCONTINUED | OUTPATIENT
Start: 2021-05-30 | End: 2021-05-31

## 2021-05-30 RX ORDER — ONDANSETRON 2 MG/ML
4 INJECTION INTRAMUSCULAR; INTRAVENOUS EVERY 6 HOURS
Status: CANCELLED | OUTPATIENT
Start: 2021-05-30 | End: 2021-05-31

## 2021-05-30 RX ORDER — TRANEXAMIC ACID 650 MG/1
1950 TABLET ORAL ONCE
Status: DISCONTINUED | OUTPATIENT
Start: 2021-05-30 | End: 2021-05-30

## 2021-05-30 RX ORDER — TRANEXAMIC ACID 10 MG/ML
1 INJECTION, SOLUTION INTRAVENOUS ONCE
Status: COMPLETED | OUTPATIENT
Start: 2021-05-30 | End: 2021-05-30

## 2021-05-30 RX ORDER — HYDROMORPHONE HYDROCHLORIDE 1 MG/ML
0.4 INJECTION, SOLUTION INTRAMUSCULAR; INTRAVENOUS; SUBCUTANEOUS
Status: DISCONTINUED | OUTPATIENT
Start: 2021-05-30 | End: 2021-06-01 | Stop reason: HOSPADM

## 2021-05-30 RX ORDER — LIDOCAINE 40 MG/G
CREAM TOPICAL
Status: DISCONTINUED | OUTPATIENT
Start: 2021-05-30 | End: 2021-05-30

## 2021-05-30 RX ORDER — FOLIC ACID 1 MG/1
1 TABLET ORAL DAILY
Status: DISCONTINUED | OUTPATIENT
Start: 2021-05-30 | End: 2021-06-01 | Stop reason: HOSPADM

## 2021-05-30 RX ORDER — SODIUM CHLORIDE, SODIUM LACTATE, POTASSIUM CHLORIDE, CALCIUM CHLORIDE 600; 310; 30; 20 MG/100ML; MG/100ML; MG/100ML; MG/100ML
INJECTION, SOLUTION INTRAVENOUS CONTINUOUS
Status: DISCONTINUED | OUTPATIENT
Start: 2021-05-30 | End: 2021-05-30

## 2021-05-30 RX ORDER — POLYETHYLENE GLYCOL 3350 17 G/17G
17 POWDER, FOR SOLUTION ORAL DAILY
Status: DISCONTINUED | OUTPATIENT
Start: 2021-05-31 | End: 2021-06-01 | Stop reason: HOSPADM

## 2021-05-30 RX ORDER — FAMOTIDINE 20 MG/1
20 TABLET, FILM COATED ORAL 2 TIMES DAILY
Status: DISCONTINUED | OUTPATIENT
Start: 2021-05-30 | End: 2021-06-01 | Stop reason: HOSPADM

## 2021-05-30 RX ORDER — HYDROXYZINE HYDROCHLORIDE 10 MG/1
10 TABLET, FILM COATED ORAL EVERY 6 HOURS PRN
Status: DISCONTINUED | OUTPATIENT
Start: 2021-05-30 | End: 2021-06-01 | Stop reason: HOSPADM

## 2021-05-30 RX ORDER — ACETAMINOPHEN 325 MG/1
650 TABLET ORAL EVERY 4 HOURS PRN
Status: DISCONTINUED | OUTPATIENT
Start: 2021-06-02 | End: 2021-06-01 | Stop reason: HOSPADM

## 2021-05-30 RX ORDER — ONDANSETRON 4 MG/1
4 TABLET, ORALLY DISINTEGRATING ORAL EVERY 30 MIN PRN
Status: DISCONTINUED | OUTPATIENT
Start: 2021-05-30 | End: 2021-05-30

## 2021-05-30 RX ORDER — LIDOCAINE 40 MG/G
CREAM TOPICAL
Status: DISCONTINUED | OUTPATIENT
Start: 2021-05-30 | End: 2021-06-01 | Stop reason: HOSPADM

## 2021-05-30 RX ORDER — ONDANSETRON 2 MG/ML
4 INJECTION INTRAMUSCULAR; INTRAVENOUS EVERY 6 HOURS PRN
Status: DISCONTINUED | OUTPATIENT
Start: 2021-05-30 | End: 2021-06-01 | Stop reason: HOSPADM

## 2021-05-30 RX ORDER — LABETALOL HYDROCHLORIDE 5 MG/ML
10 INJECTION, SOLUTION INTRAVENOUS EVERY 5 MIN PRN
Status: DISCONTINUED | OUTPATIENT
Start: 2021-05-30 | End: 2021-05-30

## 2021-05-30 RX ORDER — ACETAMINOPHEN 325 MG/1
975 TABLET ORAL EVERY 8 HOURS
Status: DISCONTINUED | OUTPATIENT
Start: 2021-05-30 | End: 2021-06-01 | Stop reason: HOSPADM

## 2021-05-30 RX ORDER — AMOXICILLIN 250 MG
1 CAPSULE ORAL 2 TIMES DAILY
Status: DISCONTINUED | OUTPATIENT
Start: 2021-05-30 | End: 2021-06-01 | Stop reason: HOSPADM

## 2021-05-30 RX ORDER — PROCHLORPERAZINE MALEATE 5 MG
5 TABLET ORAL EVERY 6 HOURS PRN
Status: DISCONTINUED | OUTPATIENT
Start: 2021-05-30 | End: 2021-06-01 | Stop reason: HOSPADM

## 2021-05-30 RX ORDER — LIDOCAINE HYDROCHLORIDE 10 MG/ML
INJECTION, SOLUTION INFILTRATION; PERINEURAL PRN
Status: DISCONTINUED | OUTPATIENT
Start: 2021-05-30 | End: 2021-05-30

## 2021-05-30 RX ORDER — DOCUSATE SODIUM 100 MG/1
100 CAPSULE, LIQUID FILLED ORAL 2 TIMES DAILY
Status: DISCONTINUED | OUTPATIENT
Start: 2021-05-30 | End: 2021-06-01 | Stop reason: HOSPADM

## 2021-05-30 RX ORDER — PANTOPRAZOLE SODIUM 40 MG/1
40 TABLET, DELAYED RELEASE ORAL 2 TIMES DAILY
Status: DISCONTINUED | OUTPATIENT
Start: 2021-05-30 | End: 2021-06-01 | Stop reason: HOSPADM

## 2021-05-30 RX ORDER — BISACODYL 10 MG
10 SUPPOSITORY, RECTAL RECTAL DAILY PRN
Status: DISCONTINUED | OUTPATIENT
Start: 2021-05-30 | End: 2021-06-01 | Stop reason: HOSPADM

## 2021-05-30 RX ORDER — CEFAZOLIN SODIUM 2 G/100ML
2 INJECTION, SOLUTION INTRAVENOUS
Status: COMPLETED | OUTPATIENT
Start: 2021-05-30 | End: 2021-05-30

## 2021-05-30 RX ORDER — LEVOCARNITINE 1 G/10ML
330 SOLUTION ORAL 3 TIMES DAILY
Status: DISCONTINUED | OUTPATIENT
Start: 2021-05-30 | End: 2021-06-01 | Stop reason: HOSPADM

## 2021-05-30 RX ORDER — OXYCODONE HYDROCHLORIDE 5 MG/1
10 TABLET ORAL EVERY 4 HOURS PRN
Status: DISCONTINUED | OUTPATIENT
Start: 2021-05-30 | End: 2021-06-01 | Stop reason: HOSPADM

## 2021-05-30 RX ORDER — LORAZEPAM 2 MG/ML
.5-1 INJECTION INTRAMUSCULAR EVERY 4 HOURS PRN
Status: DISCONTINUED | OUTPATIENT
Start: 2021-05-30 | End: 2021-06-01 | Stop reason: HOSPADM

## 2021-05-30 RX ORDER — SODIUM CHLORIDE, SODIUM LACTATE, POTASSIUM CHLORIDE, CALCIUM CHLORIDE 600; 310; 30; 20 MG/100ML; MG/100ML; MG/100ML; MG/100ML
INJECTION, SOLUTION INTRAVENOUS CONTINUOUS
Status: DISCONTINUED | OUTPATIENT
Start: 2021-05-30 | End: 2021-05-31

## 2021-05-30 RX ORDER — CEFAZOLIN SODIUM 1 G/3ML
1 INJECTION, POWDER, FOR SOLUTION INTRAMUSCULAR; INTRAVENOUS EVERY 8 HOURS
Status: COMPLETED | OUTPATIENT
Start: 2021-05-30 | End: 2021-05-31

## 2021-05-30 RX ORDER — CELECOXIB 100 MG/1
100 CAPSULE ORAL 2 TIMES DAILY
Status: DISCONTINUED | OUTPATIENT
Start: 2021-05-30 | End: 2021-06-01 | Stop reason: HOSPADM

## 2021-05-30 RX ORDER — CELECOXIB 200 MG/1
400 CAPSULE ORAL ONCE
Status: DISCONTINUED | OUTPATIENT
Start: 2021-05-30 | End: 2021-05-30

## 2021-05-30 RX ORDER — MIRTAZAPINE 15 MG/1
15 TABLET, FILM COATED ORAL AT BEDTIME
Status: DISCONTINUED | OUTPATIENT
Start: 2021-05-30 | End: 2021-06-01 | Stop reason: HOSPADM

## 2021-05-30 RX ORDER — OXYCODONE HYDROCHLORIDE 5 MG/1
5 TABLET ORAL EVERY 4 HOURS PRN
Status: DISCONTINUED | OUTPATIENT
Start: 2021-05-30 | End: 2021-06-01 | Stop reason: HOSPADM

## 2021-05-30 RX ORDER — PROPOFOL 10 MG/ML
INJECTION, EMULSION INTRAVENOUS PRN
Status: DISCONTINUED | OUTPATIENT
Start: 2021-05-30 | End: 2021-05-30

## 2021-05-30 RX ORDER — ONDANSETRON 4 MG/1
4 TABLET, ORALLY DISINTEGRATING ORAL EVERY 6 HOURS PRN
Status: DISCONTINUED | OUTPATIENT
Start: 2021-05-30 | End: 2021-06-01 | Stop reason: HOSPADM

## 2021-05-30 RX ORDER — ONDANSETRON 2 MG/ML
4 INJECTION INTRAMUSCULAR; INTRAVENOUS EVERY 30 MIN PRN
Status: DISCONTINUED | OUTPATIENT
Start: 2021-05-30 | End: 2021-05-30

## 2021-05-30 RX ORDER — DEXAMETHASONE SODIUM PHOSPHATE 4 MG/ML
INJECTION, SOLUTION INTRA-ARTICULAR; INTRALESIONAL; INTRAMUSCULAR; INTRAVENOUS; SOFT TISSUE PRN
Status: DISCONTINUED | OUTPATIENT
Start: 2021-05-30 | End: 2021-05-30

## 2021-05-30 RX ORDER — HYDROMORPHONE HYDROCHLORIDE 1 MG/ML
.3-.5 INJECTION, SOLUTION INTRAMUSCULAR; INTRAVENOUS; SUBCUTANEOUS EVERY 5 MIN PRN
Status: DISCONTINUED | OUTPATIENT
Start: 2021-05-30 | End: 2021-05-30

## 2021-05-30 RX ORDER — FENTANYL CITRATE 50 UG/ML
25-50 INJECTION, SOLUTION INTRAMUSCULAR; INTRAVENOUS
Status: DISCONTINUED | OUTPATIENT
Start: 2021-05-30 | End: 2021-05-30

## 2021-05-30 RX ORDER — MAGNESIUM HYDROXIDE 1200 MG/15ML
LIQUID ORAL PRN
Status: DISCONTINUED | OUTPATIENT
Start: 2021-05-30 | End: 2021-05-30 | Stop reason: HOSPADM

## 2021-05-30 RX ADMIN — LIDOCAINE HYDROCHLORIDE 30 MG: 10 INJECTION, SOLUTION INFILTRATION; PERINEURAL at 14:57

## 2021-05-30 RX ADMIN — PROPOFOL 160 MG: 10 INJECTION, EMULSION INTRAVENOUS at 14:57

## 2021-05-30 RX ADMIN — CEFAZOLIN SODIUM 2 G: 2 INJECTION, SOLUTION INTRAVENOUS at 14:50

## 2021-05-30 RX ADMIN — TRANEXAMIC ACID 1 G: 10 INJECTION, SOLUTION INTRAVENOUS at 15:10

## 2021-05-30 RX ADMIN — LORAZEPAM 0.5 MG: 2 INJECTION INTRAMUSCULAR; INTRAVENOUS at 09:29

## 2021-05-30 RX ADMIN — LORAZEPAM 0.5 MG: 2 INJECTION INTRAMUSCULAR; INTRAVENOUS at 09:07

## 2021-05-30 RX ADMIN — SODIUM CHLORIDE 250 MG: 9 INJECTION, SOLUTION INTRAVENOUS at 20:08

## 2021-05-30 RX ADMIN — FENTANYL CITRATE 50 MCG: 50 INJECTION, SOLUTION INTRAMUSCULAR; INTRAVENOUS at 14:57

## 2021-05-30 RX ADMIN — HYDROMORPHONE HYDROCHLORIDE 0.3 MG: 1 INJECTION, SOLUTION INTRAMUSCULAR; INTRAVENOUS; SUBCUTANEOUS at 05:23

## 2021-05-30 RX ADMIN — TAZOBACTAM SODIUM AND PIPERACILLIN SODIUM 3.38 G: 375; 3 INJECTION, SOLUTION INTRAVENOUS at 10:50

## 2021-05-30 RX ADMIN — SODIUM CHLORIDE, POTASSIUM CHLORIDE, SODIUM LACTATE AND CALCIUM CHLORIDE: 600; 310; 30; 20 INJECTION, SOLUTION INTRAVENOUS at 01:32

## 2021-05-30 RX ADMIN — SODIUM CHLORIDE, POTASSIUM CHLORIDE, SODIUM LACTATE AND CALCIUM CHLORIDE: 600; 310; 30; 20 INJECTION, SOLUTION INTRAVENOUS at 22:41

## 2021-05-30 RX ADMIN — DEXAMETHASONE SODIUM PHOSPHATE 4 MG: 4 INJECTION, SOLUTION INTRA-ARTICULAR; INTRALESIONAL; INTRAMUSCULAR; INTRAVENOUS; SOFT TISSUE at 14:58

## 2021-05-30 RX ADMIN — TAZOBACTAM SODIUM AND PIPERACILLIN SODIUM 3.38 G: 375; 3 INJECTION, SOLUTION INTRAVENOUS at 19:04

## 2021-05-30 RX ADMIN — SODIUM CHLORIDE, POTASSIUM CHLORIDE, SODIUM LACTATE AND CALCIUM CHLORIDE: 600; 310; 30; 20 INJECTION, SOLUTION INTRAVENOUS at 15:10

## 2021-05-30 RX ADMIN — CEFAZOLIN 1 G: 1 INJECTION, POWDER, FOR SOLUTION INTRAMUSCULAR; INTRAVENOUS at 22:31

## 2021-05-30 RX ADMIN — SODIUM CHLORIDE 250 MG: 9 INJECTION, SOLUTION INTRAVENOUS at 08:47

## 2021-05-30 RX ADMIN — ONDANSETRON 4 MG: 2 INJECTION INTRAMUSCULAR; INTRAVENOUS at 16:08

## 2021-05-30 RX ADMIN — FENTANYL CITRATE 50 MCG: 50 INJECTION, SOLUTION INTRAMUSCULAR; INTRAVENOUS at 15:14

## 2021-05-30 RX ADMIN — TAZOBACTAM SODIUM AND PIPERACILLIN SODIUM 3.38 G: 375; 3 INJECTION, SOLUTION INTRAVENOUS at 03:38

## 2021-05-30 RX ADMIN — SODIUM CHLORIDE, POTASSIUM CHLORIDE, SODIUM LACTATE AND CALCIUM CHLORIDE: 600; 310; 30; 20 INJECTION, SOLUTION INTRAVENOUS at 12:55

## 2021-05-30 RX ADMIN — FAMOTIDINE 20 MG: 10 INJECTION, SOLUTION INTRAVENOUS at 20:09

## 2021-05-30 ASSESSMENT — ACTIVITIES OF DAILY LIVING (ADL)
ADLS_ACUITY_SCORE: 39
ADLS_ACUITY_SCORE: 38
ADLS_ACUITY_SCORE: 39

## 2021-05-30 NOTE — PHARMACY-ADMISSION MEDICATION HISTORY
Addendum: Added Augmentin, pt has 3 doses remaining of 7 day course.    Admission medication history interview status for this patient is complete. See [facility] admission navigator for allergy information, prior to admission medications and immunization status.     Medication history interview done, indicate source(s):  Caregiver  Medication history resources (including written lists, pill bottles, clinic record): Epic list and  med list  Pharmacy:     Changes made to [facility] medication list:  Added: none  Deleted: none  Changed: none    Actions taken by pharmacist (provider contacted, etc):None     Additional medication history information: Patient was here 1/2021 and med list updated then.  The list brought in tonight appears to be older, has notes from 6/2020.  Only slight differences in forms of Calcium and Vitamin D so left as was reported 1/2021.    Medication reconciliation/reorder completed by provider prior to medication history?  No    Prior to Admission medications    Medication Sig Last Dose Taking? Auth Provider   acetaminophen (ACETAMINOPHEN 8 HOUR) 650 MG CR tablet Take 1,300 mg by mouth 2 times daily 5/29/2021 at am Yes Unknown, Entered By History   calcium carbonate-vitamin D (OS-CHAUNCEY) 500-400 MG-UNIT tablet Take 1 tablet by mouth 2 times daily 5/29/2021 at am Yes Unknown, Entered By History   carbamide peroxide (DEBROX) 6.5 % otic solution Place 4 drops into both ears Daily for 5 days of each month. Begin on the first Sunday of each month. Past Month at Unknown time Yes Reported, Patient   divalproex sodium delayed-release (DEPAKOTE) 250 MG DR tablet Take 1 tablet (250 mg) by mouth 2 times daily 5/29/2021 at am Yes Ted Clinton MD   Escitalopram Oxalate (LEXAPRO PO) Take 15 mg by mouth daily  5/29/2021 at am Yes Reported, Patient   ferrous sulfate (FEROSUL) 325 (65 Fe) MG tablet Take 325 mg by mouth three times a week (Monday, Wednesday, Friday) 5/28/2021 at Unknown time Yes Unknown,  Entered By History   folic acid (FOLVITE) 1 MG tablet Take 1 mg by mouth daily 5/29/2021 at am Yes Unknown, Entered By History   levOCARNitine (CARNITOR) 1 GM/10ML solution Take 330 mg by mouth 3 times daily 5/29/2021 at am Yes Unknown, Entered By History   mirtazapine (REMERON) 15 MG tablet Take 15 mg by mouth At Bedtime 5/28/2021 at Unknown time Yes Unknown, Entered By History   multivitamin w/minerals (MULTI-VITAMIN) tablet Take 1 tablet by mouth daily 5/29/2021 at am Yes Unknown, Entered By History   OLANZapine (ZYPREXA) 15 MG tablet Take 15 mg by mouth At Bedtime  5/28/2021 at Unknown time Yes Reported, Patient   pantoprazole (PROTONIX) 40 MG EC tablet Take 40 mg by mouth 2 times daily 5/29/2021 at am Yes Unknown, Entered By History   psyllium (METAMUCIL/KONSYL) 58.6 % powder Take 1 Tablespoonful by mouth every morning 5/29/2021 at am Yes Unknown, Entered By History   senna (SENOKOT) 8.6 MG tablet Take 1 tablet by mouth daily 5/29/2021 at am Yes Unknown, Entered By History   acetaminophen (TYLENOL) 325 MG tablet Take 650 mg by mouth every 4 hours as needed for fever or pain   Unknown, Entered By History   clotrimazole (LOTRIMIN) 1 % external cream Apply topically 2 times daily as needed   Unknown, Entered By History   diazepam (VALIUM) 5 MG tablet Take 7.5 mg by mouth as needed (90 minutes prior to medical appointments)   Unknown, Entered By History   ibuprofen (ADVIL/MOTRIN) 200 MG tablet Take 600 mg by mouth every 6 hours as needed for fever or pain   Unknown, Entered By History   loratadine (CLARITIN) 10 MG tablet Take 10 mg by mouth daily as needed for allergies   Unknown, Entered By History   triamcinolone (KENALOG) 0.1 % external cream Apply topically 2 times daily as needed for irritation   Unknown, Entered By History

## 2021-05-30 NOTE — ED NOTES
Informed MD that we are unable to hold patient to safely put in IV for blood work and follow up care. Awaiting orders.

## 2021-05-30 NOTE — BRIEF OP NOTE
[facility]    Brief Operative Note    Pre-operative diagnosis: Closed displaced intertrochanteric fracture of right femur, initial encounter (H) [W21.853H]  Post-operative diagnosis Same as pre-operative diagnosis    Procedure: Procedure(s):  INTERNAL FIXATION, FRACTURE, TROCHANTERIC, RIGHT HIP  Surgeon: Surgeon(s) and Role:     * Umair Caceres MD - Primary     * Brenda Mares PA-C - Assisting  Anesthesia: General   Estimated blood loss: Less than 100 ml  Drains: None  Specimens: * No specimens in log *  Findings:   None.  Complications: None.  Implants:   Implant Name Type Inv. Item Serial No.  Lot No. LRB No. Used Action   11 mm/130 deg Ti Silvia TFNA 170 mm    Sigma Force [id number] Right 1 Implanted   IMP SCR SYN TFNA FENESTRATED LAG 80MM 04.038.180S - [id number] Metallic Hardware/[place] IMP SCR SYN TFNA FENESTRATED LAG 80MM [id number]  Sigma Force[employer] [id number] Right 1 Implanted   IMP SCR SYN 5.0 TI LOCK T25 STARDRIVE 28MM 04.005.518S - [id number] Metallic Hardware/[place] IMP SCR SYN 5.0 TI LOCK T25 STARDRIVE 28MM [id number]  [employer] [id number] Right 1 Implanted

## 2021-05-30 NOTE — PROCEDURES
[facility]    Double Lumen Midline Placement    Date/Time: 5/30/2021 10:06 AM  Performed by: Izaiah Aguilar RN  Authorized by: David Dumont DO   Indications: vascular access    UNIVERSAL PROTOCOL   Site Marked: Yes  Prior Images Obtained and Reviewed:  Yes  Required items: Required blood products, implants, devices and special equipment available    Patient identity confirmed:  Verbally with patient, arm band and hospital-assigned identification number  Patient was reevaluated immediately before administering moderate or deep sedation or anesthesia  Confirmation Checklist:  Patient's identity using two indicators, relevant allergies, procedure was appropriate and matched the consent or emergent situation and correct equipment/implants were available  Time out: Immediately prior to the procedure a time out was called    Universal Protocol: the Joint Commission Universal Protocol was followed    Preparation: Patient was prepped and draped in usual sterile fashion           ANESTHESIA    Anesthesia: Local infiltration  Local Anesthetic:  Lidocaine 1% without epinephrine  Anesthetic Total (mL):  1      SEDATION    Patient Sedated: No        Preparation: skin prepped with 2% chlorhexidine  Skin prep agent: skin prep agent completely dried prior to procedure  Sterile barriers: maximum sterile barriers were used: cap, mask, sterile gown, sterile gloves, and large sterile sheet  Hand hygiene: hand hygiene performed prior to central venous catheter insertion  Type of line used: Midline  Catheter type: double lumen  Lumen type: non-valved  Catheter size: 4 Fr  Brand: Bard  Lot number: [id number]  Placement method: venipuncture and ultrasound  Number of attempts: 1  Successful placement: yes  Orientation: right  Location: brachial vein (lateral) (4.9mm)  Arm circumference: adults 10 cm  Extremity circumference: 23  Visible catheter length: 1  Internal length: 12 cm  Total catheter length:  13  Dressing and securement: blood cleaned with CHG, blood removed, chlorhexidine disc applied, occlusive dressing applied, statlock and sterile dressing applied  Post procedure assessment: blood return through all ports  PROCEDURE   Patient Tolerance:  Patient tolerated the procedure well with no immediate complications     Midline ok to use, Zena Nascimento RN informed

## 2021-05-30 NOTE — ANESTHESIA PREPROCEDURE EVALUATION
Anesthesia Pre-Procedure Evaluation    Patient: Gigi Lackey   MRN: [id number] : 1955        Preoperative Diagnosis: Closed displaced intertrochanteric fracture of right femur, initial encounter (H) [S72.141A]   Procedure : Procedure(s):  INTERNAL FIXATION, FRACTURE, TROCHANTERIC, RIGHT HIP     Past Medical History:   Diagnosis Date     Anemia      Epilepsy (H)      Gastro-oesophageal reflux disease      Osteoporosis      Pervasive developmental disorder      Profound intellectual disability       Past Surgical History:   Procedure Laterality Date     ORTHOPEDIC SURGERY      ankle      Allergies   Allergen Reactions     Actifed [Allerfrim]      Antihistamines, Chlorpheniramine-Type [Alkylamines]      Doxycycline      Morphine      Pseudoephedrine      Sympathomimetics       Social History     Tobacco Use     Smoking status: Never Smoker   Substance Use Topics     Alcohol use: No      Wt Readings from Last 1 Encounters:   21 49.4 kg (109 lb)        Anesthesia Evaluation   Pt has had prior anesthetic. Type: General.    No history of anesthetic complications       ROS/MED HX  ENT/Pulmonary:  - neg pulmonary ROS     Neurologic: Comment: Profound mental disability    (+) Developmental delay,     Cardiovascular:  - neg cardiovascular ROS     METS/Exercise Tolerance:     Hematologic:  - neg hematologic  ROS     Musculoskeletal:  - neg musculoskeletal ROS     GI/Hepatic:     (+) GERD,     Renal/Genitourinary:  - neg Renal ROS     Endo:  - neg endo ROS     Psychiatric/Substance Use:  - neg psychiatric ROS     Infectious Disease:  - neg infectious disease ROS     Malignancy:  - neg malignancy ROS     Other:  - neg other ROS          Physical Exam    Airway      Comment: unable         Respiratory Devices and Support         Dental     Comment: unable        Cardiovascular   cardiovascular exam normal          Pulmonary   pulmonary exam normal                OUTSIDE LABS:  CBC:   Lab Results   Component Value  Date    WBC 13.2 (H) 05/29/2021    WBC 16.0 (H) 01/14/2021    HGB 9.2 (L) 05/30/2021    HGB 9.4 (L) 05/29/2021    HCT 30.3 (L) 05/29/2021    HCT 36.0 (L) 01/14/2021     05/29/2021     (H) 01/14/2021     BMP:   Lab Results   Component Value Date     05/29/2021     (H) 01/14/2021    POTASSIUM 3.9 05/29/2021    POTASSIUM 3.8 01/14/2021    CHLORIDE 107 05/29/2021    CHLORIDE 113 (H) 01/14/2021    CO2 31 05/29/2021    CO2 32 01/14/2021    BUN 16 05/29/2021    BUN 14 01/14/2021    CR 0.60 (L) 05/29/2021    CR 0.49 (L) 01/14/2021     (H) 05/29/2021    GLC 95 01/14/2021     COAGS:   Lab Results   Component Value Date    PTT 50 (H) 05/29/2021    INR 1.23 (H) 05/29/2021     POC:   Lab Results   Component Value Date     (H) 09/17/2019     HEPATIC:   Lab Results   Component Value Date    ALBUMIN 2.5 (L) 01/14/2021    PROTTOTAL 7.4 01/14/2021    ALT 23 01/14/2021    AST 23 01/14/2021    ALKPHOS 107 01/14/2021    BILITOTAL 0.4 01/14/2021    RAMON 38 01/14/2021     OTHER:   Lab Results   Component Value Date    LACT 1.6 01/14/2021    CHAUNCEY 9.3 05/29/2021       Anesthesia Plan    ASA Status:  3      Anesthesia Type: General.     - Airway: LMA   Induction: Intravenous, Propofol.   Maintenance: Balanced.        Consents    Anesthesia Plan(s) and associated risks, benefits, and realistic alternatives discussed. Questions answered and patient/representative(s) expressed understanding.     - Discussed with:  Patient         Postoperative Care    Pain management: IV analgesics, Oral pain medications, Multi-modal analgesia.   PONV prophylaxis: Ondansetron (or other 5HT-3), Dexamethasone or Solumedrol     Comments:                Kirby Milan MD

## 2021-05-30 NOTE — PROGRESS NOTES
Formal consult to follow, I have reviewed the history and imaging.  In brief, 65 year old man who lives in a group home and ambulates with a gait belt.  Fell at the home.  He suffered a right intertrochanteric femur fracture.  He would benefit from operative fixation of the fracture.  Will plan for surgery tomorrow if he is medically cleared.

## 2021-05-30 NOTE — ANESTHESIA CARE TRANSFER NOTE
Patient: Gigi Lackey    Procedure(s):  INTERNAL FIXATION, FRACTURE, TROCHANTERIC, RIGHT HIP    Diagnosis: Closed displaced intertrochanteric fracture of right femur, initial encounter (H) [S72.857A]  Diagnosis Additional Information: No value filed.    Anesthesia Type:   General     Note:    Oropharynx: spontaneously breathing  Level of Consciousness: drowsy  Oxygen Supplementation: face mask  Level of Supplemental Oxygen (L/min / FiO2): 6  Independent Airway: airway patency satisfactory and stable  Dentition: dentition unchanged  Vital Signs Stable: post-procedure vital signs reviewed and stable  Report to RN Given: handoff report given  Patient transferred to: PACU    Handoff Report: Identifed the Patient, Identified the Reponsible Provider, Reviewed the pertinent medical history, Discussed the surgical course, Reviewed Intra-OP anesthesia mangement and issues during anesthesia, Set expectations for post-procedure period and Allowed opportunity for questions and acknowledgement of understanding      Vitals: (Last set prior to Anesthesia Care Transfer)  CRNA VITALS  5/30/2021 1554 - 5/30/2021 1625      5/30/2021             Pulse:  103    SpO2:  100 %    Resp Rate (observed):  9        Electronically Signed By: JOSE Gibson CRNA  May 30, 2021  4:25 PM

## 2021-05-30 NOTE — PROGRESS NOTES
[facility]    Hospitalist Progress Note  Name: Gigi Lackey    MRN: [id number]  Provider:  David Dumont DO, MPH  Date of Service: 05/30/2021    Summary of Stay: Gigi Lackey is a 65 year old male with past medical history of phenylketonuria, mental development disorder, epilepsy, GERD, anemia, who is nonverbal at baseline and a state aponte with guardianship, who presented from group home after a witnessed fall with right lower extremity pain and inability to bear weight.     In the ED afebrile, /94, pulse 90, RR 20, oxygen saturation not recorded.  Imaging in the ED included negative NCHCT. CT cervical spine showed fracture at the base of the dens with cortication of fracture fragments suggestive of chronic appearance.  CT C-spine was reviewed with neurosurgery service who agreed non to be an acute finding and did not recommend C- Collar.      Given guarding of right lower extremity imaging of the pelvis, right hip, tibia and fibula as well as foot was pursued in the emergency department.  Pelvis x-ray showed acute fracture of right femoral neck extending likely into the greater trochanter, with possible extension into the lesser trochanter, moderate fracture angulation, developmentally dysplastic hips bilaterally with demineralized bones and soft tissue swelling that the right proximal femur.    Patient received 5 mg oral oxycodone in the ED.  Patient's case and presentation was discussed with on-call orthopedic surgery.  Admission was requested to hospitalist service for further cares and monitoring with orthopedic surgery consult for definitive treatment.       1. Right Intertrochanteric Femur Fracture -  Mechanical Fall -  Pt presented with pain and inability to bear weight in the right lower extremity. At baseline ambulates with gait belt, WC for longer distances. In terms of surgical risk at the patient has no known history of cardiac disease.  He carries risk of undergoing  surgery given his multiple co morbidities. Has had prior surgery without known issues with anesthesia per report.   -Orthopedic surgery consult.  -NPO.  -NWB status.  -Lab work unremarkable, EKG without acute ST changes.  -IV maintenance fluids prior to surgery as p.o. intake has been limited.  -Analgesia as needed.  -Midline IV placed today.     2. Dens Fracture - not acute. CT cervical spine showed fracture at the base of the dens with cortication of fracture fragments suggestive of chronic appearance.  Not noted on prior imaging. CT C-spine was reviewed with neurosurgery service who agreed non to be an acute finding and did not recommend C- Collar.      3.  Developmental delay and history of Phenylketonuria - Currently calm but resistant to exam and interventions. Patient is a state aponte and DNR/DNI but care is to restorative.  At risk for delirium.  Nonverbal at baseline but can apparently make somewhat make basic needs known with hand gestures.  -Bedside attendant as needed.  -Fall precautions.  -Reordered 15 mg at bedtime Zyprexa that he is on PTA.  -Haldol prn.      4.  History of seizure disorder - Chronically on Depakote.  No recent epileptic activity or prior to fall noted by staff.  -Resumed PTA Depakote.  -Depakote level low.     5. Dysphagia -  Past swallow study in 2018 showed aspiration with all consistencies. Per documentation care team decided to continue with his thickened dysphagia diet which adds to his quality of life despite ongoing aspiration. He is not a feeding tube candidate.  -DD3 diet, thickened liquids when diet is advanced  -aspiration precautions      6. Anemia  Prior Hgb around 11.7 when checked in January. History of chronic anemia. No reported bleeding symptoms.   -Hold PTA ferrous sulfate.  -Type and cross, CBC.    DVT Prophylaxis: Pneumatic Compression Devices  Code Status: No CPR- Do NOT Intubate  Diet: NPO for Medical/Clinical Reasons Except for: Meds, Ice Chips    Contreras  Catheter: not present  Disposition: Expected discharge in 3 days to home vs TCU. Goals prior to discharge include surgery and pain mgmt.   Incidental Findings: None.  Family updated today: No     Interval History   Assumed care from previous hospitalist. The history was fully reviewed.  Unable to provide history.    -Data reviewed today: I personally reviewed all new labs and imaging results over the last 24 hours.     Physical Exam   Temp: 97.5  F (36.4  C) Temp src: Temporal BP: 117/42 Pulse: 104   Resp: 16 SpO2: 97 % O2 Device: None (Room air)    Vitals:    05/29/21 2119   Weight: 49.4 kg (109 lb)     Vital Signs with Ranges  Temp:  [97.2  F (36.2  C)-98.9  F (37.2  C)] 97.5  F (36.4  C)  Pulse:  [] 104  Resp:  [16-20] 16  BP: ()/(42-94) 117/42  SpO2:  [91 %-100 %] 97 %  No intake/output data recorded.    GENERAL: No apparent distress. Awake, alert, and not oriented.  HEENT: Normocephalic, atraumatic. Extraocular movements intact.  CARDIOVASCULAR: Regular rate and rhythm without murmurs or rubs. No S3.  PULMONARY: Clear bilaterally.  GASTROINTESTINAL: Soft, non-tender, non-distended. Bowel sounds normoactive.   EXTREMITIES: No cyanosis or clubbing. No edema.  NEUROLOGICAL: CN 2-12 grossly intact, no focal neurological deficits.  DERMATOLOGICAL: No rash, ulcer, bruising, nor jaundice.     Medications     - MEDICATION INSTRUCTIONS -         acetaminophen  975 mg Oral Q8H     OLANZapine  15 mg Oral At Bedtime     piperacillin-tazobactam  3.375 g Intravenous Q8H     sodium chloride (PF)  10 mL Intracatheter Q8H     sodium chloride (PF)  3 mL Intracatheter Q8H     valproate (DEPACON) in NS intermittent infusion  130 mg Intravenous Q6H     Data     Laboratory:  Recent Labs   Lab 05/30/21  0713 05/29/21 2151   WBC  --  13.2*   HGB 9.2* 9.4*   HCT  --  30.3*   MCV  --  102*   PLT  --  440     Recent Labs   Lab 05/29/21 2151      POTASSIUM 3.9   CHLORIDE 107   CO2 31   ANIONGAP 3   *   BUN 16    CR 0.60*   GFRESTIMATED >90   GFRESTBLACK >90   CHAUNCEY 9.3     No results for input(s): CULT in the last 168 hours.    Imaging:  Recent Results (from the past 24 hour(s))   Head CT w/o contrast    Narrative    EXAM: CT HEAD W/O CONTRAST, CT CERVICAL SPINE W/O CONTRAST  LOCATION: [facility]  DATE/TIME: 5/29/2021 6:35 PM    INDICATION: Head and neck injury  COMPARISON: CT head 09/17/2019, CT cervical spine 06/05/2016  TECHNIQUE:   1) Routine CT Head without IV contrast. Multiplanar reformats. Dose reduction techniques were used.  2) Routine CT Cervical Spine without IV contrast. Multiplanar reformats. Dose reduction techniques were used.    FINDINGS:   HEAD CT:   INTRACRANIAL CONTENTS: No intracranial hemorrhage, extraaxial collection, or mass effect.  No CT evidence of acute infarct. Moderate presumed chronic small vessel ischemic changes. Moderate generalized volume loss. No hydrocephalus.     VISUALIZED ORBITS/SINUSES/MASTOIDS: Partially calcified shrunken and left optic globe, similar to prior. No paranasal sinus mucosal disease. No middle ear or mastoid effusion.    BONES/SOFT TISSUES: No acute abnormality.    CERVICAL SPINE CT:   VERTEBRA: Study significantly affected by motion artifact. Slight anterior subluxation C4 on C5 and C5 on C6. Fracture at the base of the dens; new since prior, however cortication of fracture fragments suggests a chronic fracture. No definite other   fractures. Mild left cervical curve.    CANAL/FORAMINA: Multilevel degenerative changes without significant canal narrowing at any level. Neural foraminal narrowing: Mild bilateral at C2-C3, moderate right and mild left at C3-C4, moderate right at C5-C6, moderate left at C6-C7.    PARASPINAL: No extraspinal abnormality. Visualized lung fields are clear.      Impression    IMPRESSION:  HEAD CT:  1.  No acute intracranial process.    CERVICAL SPINE CT:  1.  Fracture at the base of the dens with cortication of fracture fragments  suggesting chronicity.  2.  No definite acute fracture.  3.  Degenerative changes, as described.   Cervical spine CT w/o contrast    Narrative    EXAM: CT HEAD W/O CONTRAST, CT CERVICAL SPINE W/O CONTRAST  LOCATION: [facility]  DATE/TIME: 5/29/2021 6:35 PM    INDICATION: Head and neck injury  COMPARISON: CT head 09/17/2019, CT cervical spine 06/05/2016  TECHNIQUE:   1) Routine CT Head without IV contrast. Multiplanar reformats. Dose reduction techniques were used.  2) Routine CT Cervical Spine without IV contrast. Multiplanar reformats. Dose reduction techniques were used.    FINDINGS:   HEAD CT:   INTRACRANIAL CONTENTS: No intracranial hemorrhage, extraaxial collection, or mass effect.  No CT evidence of acute infarct. Moderate presumed chronic small vessel ischemic changes. Moderate generalized volume loss. No hydrocephalus.     VISUALIZED ORBITS/SINUSES/MASTOIDS: Partially calcified shrunken and left optic globe, similar to prior. No paranasal sinus mucosal disease. No middle ear or mastoid effusion.    BONES/SOFT TISSUES: No acute abnormality.    CERVICAL SPINE CT:   VERTEBRA: Study significantly affected by motion artifact. Slight anterior subluxation C4 on C5 and C5 on C6. Fracture at the base of the dens; new since prior, however cortication of fracture fragments suggests a chronic fracture. No definite other   fractures. Mild left cervical curve.    CANAL/FORAMINA: Multilevel degenerative changes without significant canal narrowing at any level. Neural foraminal narrowing: Mild bilateral at C2-C3, moderate right and mild left at C3-C4, moderate right at C5-C6, moderate left at C6-C7.    PARASPINAL: No extraspinal abnormality. Visualized lung fields are clear.      Impression    IMPRESSION:  HEAD CT:  1.  No acute intracranial process.    CERVICAL SPINE CT:  1.  Fracture at the base of the dens with cortication of fracture fragments suggesting chronicity.  2.  No definite acute fracture.  3.   Degenerative changes, as described.   XR Foot Right 2 Views    Narrative    EXAM: XR FOOT RIGHT 2 VIEWS  LOCATION: [facility]  DATE/TIME: 5/29/2021 7:37 PM    INDICATION: Right ankle pain after a fall.  COMPARISON: Tibia/fibular radiographs, same date.      Impression    IMPRESSION:     Generalized demineralization. No definite acute fracture. Oblique lucency overlying the lateral base of the distal phalanx in the right great toe is suspected to represent a summation shadow. Normal joint alignment with no acute subluxation. Moderate   degenerative arthritic changes in the ankle/tibiotalar joint. Fusion of the subtalar joint, either postsurgical or developmental, or due to relative disuse. Arthrodesis of the first TMT joint and between the first and second metatarsal bases with screws.   No hardware loosening or complication.   XR Pelvis w Hip Right 1 View    Narrative    EXAM: XR PELVIS AND HIP RIGHT 1 VIEW  LOCATION: [facility]  DATE/TIME: 5/29/2021 7:37 PM    INDICATION: Right leg pain after a fall  COMPARISON: None.      Impression    IMPRESSION:     Acute right femoral fracture, involving the basicervical femoral neck, likely extending into the greater trochanter. Possible but not definite extension into the lesser trochanter. Moderate fracture angulation, no significant fracture displacement. The   femoral head articular surface remains intact.    The right hip is developmentally dysplastic, but joint spacing is maintained without significant degenerative arthritic changes. No hip subluxation or dislocation.    Left hip is similarly developmentally dysplastic without significant degenerative changes. No left hip joint malalignment.    Bones are demineralized and gracile, consistent with chronic disuse.    Soft tissue swelling present about the proximal right femur.   XR Tibia & Fibula Right 2 Views    Addendum: 5/29/2021    ADDENDUM dictated by Dr. Bassam Beard,  05/29/2021.  Original report dictated by Dr. Bassam Beard, 05/29/2021    The following report supersedes the previously distributed report. Changes have been made to the original report as indicated by the double parentheses ((   )).  Please disregard previous report.    EXAM: XR TIBIA and FIBULA RT 2 VW  LOCATION: [facility]  DATE/TIME: 5/29/2021 7:37 PM    INDICATION: Leg pain after a fall.  COMPARISON: ((Foot radiographs, same date.))    IMPRESSION: Generalized demineralization. No fracture identified in the tibia or fibula. Normal knee and ankle joint alignment. Generalized atrophy/volume loss of the musculature in the calf.        Narrative    EXAM: XR TIBIA and FIBULA RT 2 VW  LOCATION: [facility]  DATE/TIME: 5/29/2021 7:37 PM    INDICATION: Leg pain after a fall.  COMPARISON: Ankle and foot radiographs, same date.      Impression    IMPRESSION: Generalized demineralization. No fracture identified in the tibia or fibula. Normal knee and ankle joint alignment. Generalized atrophy/volume loss of the musculature in the calf.   XR Chest Port 1 View    Narrative    CHEST PORTABLE ONE VIEW   5/30/2021 8:52 AM     HISTORY: Recent history of antibiotics for suspected pneumonia.    COMPARISON: Chest x-ray 9/17/2019.      Impression    IMPRESSION: Portable chest. Lungs are hyperinflated with retrocardiac  opacity likely left lower lung consolidation or atelectasis. This has  increased since prior exam. Findings likely reflect underlying COPD.  Heart is normal in size. No pneumothorax. No definite pleural  effusions.    MD David TALLEY DO, MPH  Critical access hospital Hospitalist  [address] [place] [address]  05/30/2021

## 2021-05-30 NOTE — ANESTHESIA POSTPROCEDURE EVALUATION
Patient: Gigi Lackey    Procedure(s):  INTERNAL FIXATION, FRACTURE, TROCHANTERIC, RIGHT HIP    Diagnosis:Closed displaced intertrochanteric fracture of right femur, initial encounter (H) [S72.873A]  Diagnosis Additional Information: No value filed.    Anesthesia Type:  General    Note:  Disposition: Inpatient   Postop Pain Control: Uneventful            Sign Out: Well controlled pain   PONV: No   Neuro/Psych: Uneventful            Sign Out: Acceptable/Baseline neuro status   Airway/Respiratory: Uneventful            Sign Out: Acceptable/Baseline resp. status   CV/Hemodynamics: Uneventful            Sign Out: Acceptable CV status   Other NRE: NONE   DID A NON-ROUTINE EVENT OCCUR? No           Last vitals:  Vitals:    05/30/21 1645 05/30/21 1658 05/30/21 1700   BP: 133/65  (!) 150/62   Pulse: 91     Resp: 22     Temp:  98.5  F (36.9  C)    SpO2: 99%         Last vitals prior to Anesthesia Care Transfer:  CRNA VITALS  5/30/2021 1554 - 5/30/2021 1654      5/30/2021             Pulse:  103    SpO2:  100 %    Resp Rate (observed):  9          Electronically Signed By: Kirby Milan MD  May 30, 2021  5:10 PM

## 2021-05-30 NOTE — ED NOTES
Emergency Department Attending Supervision Note  2/21/2018  4:46 PM      I evaluated this patient in conjunction with Herber Catrer PA-C      Briefly,  65 year old male who is non verbal at baseline, suffering accidental fall while walking with a .  Pain localizes to R hip.  At baseline.         On my exam,     HENT: AT  Eyes: Normal conjunctiva, No  discharge  CV: ppi, regular   Resp: speaking in full sentences without any resp distress  Skin: warm dry well perfused  Ext: pain RLE with int/ext rotation at hip  Neuro: awake, nonverbal (baseline), MAEE except limited movement RLE, movement of RLE elicits pain.           Results:    Labs Ordered and Resulted from Time of ED Arrival Up to the Time of Departure from the ED   CBC WITH PLATELETS DIFFERENTIAL - Abnormal; Notable for the following components:       Result Value    WBC 13.2 (*)     RBC Count 2.98 (*)     Hemoglobin 9.4 (*)     Hematocrit 30.3 (*)      (*)     MCHC 31.0 (*)     Absolute Neutrophil 11.3 (*)     All other components within normal limits   INR - Abnormal; Notable for the following components:    INR 1.23 (*)     All other components within normal limits   PARTIAL THROMBOPLASTIN TIME - Abnormal; Notable for the following components:    PTT 50 (*)     All other components within normal limits   BASIC METABOLIC PANEL - Abnormal; Notable for the following components:    Glucose 110 (*)     Creatinine 0.60 (*)     All other components within normal limits   VALPROIC ACID - Abnormal; Notable for the following components:    Valproic Acid Level 27 (*)     All other components within normal limits   SARS-COV-2 (COVID-19) VIRUS RT-PCR   PERIPHERAL IV CATHETER         XR Chest Port 1 View   Final Result   IMPRESSION: Portable chest. Lungs are hyperinflated with retrocardiac   opacity likely left lower lung consolidation or atelectasis. This has   increased since prior exam. Findings likely reflect underlying COPD.   Heart is normal  in size. No pneumothorax. No definite pleural   effusions.      GEORGINA HI MD      XR Tibia & Fibula Right 2 Views   Final Result   Addendum 1 of 1   ADDENDUM dictated by Dr. Bassam Beard, 05/29/2021.   Original report dictated by Dr. Bassam Beard, 05/29/2021      The following report supersedes the previously distributed report. Changes    have been made to the original report as indicated by the double    parentheses ((   )).  Please disregard previous report.      EXAM: XR TIBIA and FIBULA RT 2 VW   LOCATION: [facility]   DATE/TIME: 5/29/2021 7:37 PM      INDICATION: Leg pain after a fall.   COMPARISON: ((Foot radiographs, same date.))      IMPRESSION: Generalized demineralization. No fracture identified in the    tibia or fibula. Normal knee and ankle joint alignment. Generalized    atrophy/volume loss of the musculature in the calf.         Final      XR Pelvis w Hip Right 1 View   Final Result   IMPRESSION:       Acute right femoral fracture, involving the basicervical femoral neck, likely extending into the greater trochanter. Possible but not definite extension into the lesser trochanter. Moderate fracture angulation, no significant fracture displacement. The    femoral head articular surface remains intact.      The right hip is developmentally dysplastic, but joint spacing is maintained without significant degenerative arthritic changes. No hip subluxation or dislocation.      Left hip is similarly developmentally dysplastic without significant degenerative changes. No left hip joint malalignment.      Bones are demineralized and gracile, consistent with chronic disuse.      Soft tissue swelling present about the proximal right femur.      XR Foot Right 2 Views   Final Result   IMPRESSION:       Generalized demineralization. No definite acute fracture. Oblique lucency overlying the lateral base of the distal phalanx in the right great toe is suspected to represent a summation  shadow. Normal joint alignment with no acute subluxation. Moderate    degenerative arthritic changes in the ankle/tibiotalar joint. Fusion of the subtalar joint, either postsurgical or developmental, or due to relative disuse. Arthrodesis of the first TMT joint and between the first and second metatarsal bases with screws.    No hardware loosening or complication.      Cervical spine CT w/o contrast   Final Result   IMPRESSION:   HEAD CT:   1.  No acute intracranial process.      CERVICAL SPINE CT:   1.  Fracture at the base of the dens with cortication of fracture fragments suggesting chronicity.   2.  No definite acute fracture.   3.  Degenerative changes, as described.      Head CT w/o contrast   Final Result   IMPRESSION:   HEAD CT:   1.  No acute intracranial process.      CERVICAL SPINE CT:   1.  Fracture at the base of the dens with cortication of fracture fragments suggesting chronicity.   2.  No definite acute fracture.   3.  Degenerative changes, as described.      XR Surgery FABRICE L/T 5 Min Fluoro w Stills    (Results Pending)             My impression is accidental fall resulting in R hip fracture.  No other significant injury.  CT C spine noted for chronic dens fracture.  Admit, ortho consult.         Diagnosis    ICD-10-CM    1. Fall, initial encounter  W19.XXXA CBC with platelets differential     INR     Partial thromboplastin time     ABO/Rh type and screen     Basic metabolic panel     Asymptomatic SARS-CoV-2 COVID-19 Virus (Coronavirus) by PCR     Valproic acid (Depakote level)     Hemoglobin   2. Right femoral fracture (H)  S72.91XA    3. Closed odontoid fracture, initial encounter (H)  S12.100A     Chronic appearing           Ezekiel Liev MD  [facility]       Ezekiel Live MD  05/30/21 0326

## 2021-05-30 NOTE — CONSULTS
"Care Management Initial Consult    General Information  Assessment completed with: ([employer] [facility] staff- Kait), Kait- caregiver. [facility] for legal representative, Rosita Simmons, ph# [phone]  Type of CM/SW Visit: Initial Assessment    Primary Care Provider verified and updated as needed: Yes   Readmission within the last 30 days:        Reason for Consult: care coordination/care conference  Advance Care Planning: Advance Care Planning Reviewed: other (comment)(Requested  send Guardianship paperwork)          Communication Assessment  Patient's communication style: (non verbal)    Hearing Difficulty or Deaf: other (see comments)(care giver says \"unsure.\")   Wear Glasses or Blind: per caregiver, no glasses, but some left eye deficit)    Cognitive  Cognitive/Neuro/Behavioral: .WDL except  Level of Consciousness: alert  Arousal Level: opens eyes spontaneously  Orientation: other (see comments)(ADONIS)  Mood/Behavior: agitated, restless  Best Language: 2 - Severe aphasia  Speech: unable to speak    Living Environment:   People in home: facility resident     Current living Arrangements: group [place]      Able to return to prior arrangements: (TBD)     Family/Social Support:  Care provided by: [employer]  staff  Provides care for: no one     ([facility] staff, county worker)          Description of Support System: Supportive, Involved    Current Resources:   Patient receiving home care services: No     Community Resources: [facility] Programs, [facility] Worker  Equipment currently used at home: (none -SBA w GB)  Supplies currently used at home:      Employment/Financial:  Employment Status: disabled        Financial Concerns: No concerns identified       Lifestyle & Psychosocial Needs:        Socioeconomic History     Marital status: Single     Spouse name: Not on file     Number of children: Not on file     Years of education: Not on file     Highest education level: Not on file     Tobacco Use     Smoking status: Never " Smoker   Substance and Sexual Activity     Alcohol use: No     Drug use: No       Functional Status:  Prior to admission patient needed assistance: Yes.  staff provides assistance with all cares: bathing, dressing, grooming, showers, toileting, meals, medications      Mental Health Status:      Patient non verbal at baseline    Chemical Dependency Status:  Chemical Dependency Status: No Current Concerns             Values/Beliefs:  Spiritual, Cultural Beliefs, Anglican Practices, Values that affect care: no               Additional Information:  Patient admitted with patient after a fall at his group home. Noted to have rt femur fx. Patient is a [facility] of the [facility].     Called legal representative/guardian listed on [facility] paperwork, Rosita Simmons ph# [phone], [facility]. Called alternative emergency contacts listed- no answer.     Called and spoke with [facility] staff and caregiver, Kait, ph# [phone]. She confirms patient lives at [facility] in [place] since 1995. At baseline,  patient non verbal, he stands and gets staff attention. Patient walks independent in the home w/o walker or cane. He is SBA with stairs and SBA w GB when outside. Facility staff assist with feeding. Patient on a pureed diet. He takes pills whole with applesauce. Facility staff provides all cares. I requested  fax guardianship paperwork to [employer] and care management fax# [phone].     Discussed needing to contact patient [facility] Guardian.  states they will try reaching guardian as well and have guardian contact the hospital.  primary nurse is Narcisa.    Patient pending Orthopedic Consult and further eval/txmt plan.     Will continue to follow.     Kenneth Espinosa RN Case Manager  Inpatient Care Coordination   [facility]   [phone]      Kenneth Espinosa RN

## 2021-05-30 NOTE — PROGRESS NOTES
VSS. Patient appears comfortable/resting when left alone. Patient becomes resistant whenever touched, requiring 2-3 person assistance with cares. NPO. IVF infusing via midline. IV zosyn and valproate infused as scheduled. IV ativan 1mg total given prior to midline placement. Incontinent of urine, external catheter placed. Semaj wipes completed. Patient left for surgery.

## 2021-05-30 NOTE — CONSULTS
Consult Date: 05/30/2021    REFERRING PHYSICIAN:  Melani Dietz PA-C    HISTORY OF PRESENT ILLNESS:  Mr. Lackey is a very pleasant 65-year-old gentleman with a medical history of phenylketonuria and developmental disorder and epilepsy who is nonverbal at baseline, who presented from a group home after a witnessed fall on his right lower extremity and inability to bear weight.  He was ambulating which he only ambulates with a gait belt and tripped and fell.  He was found, with CT in the Emergency Room, found to have an old dens fracture.  This was reviewed with Neurosurgery who agreed that this is not an acute finding.    PAST MEDICAL HISTORY:  Anemia, epilepsy, gastroesophageal reflux, osteoporosis and pervasive mental disorder and profound intellectual disability.    PAST SURGICAL HISTORY:  Significant for previous ankle surgery.    MEDICATIONS:  On admission, he is on:  1.  Lexapro.  2.  Zyprexa.  3.  Tylenol.  4.  OsCal.  5.  Debrox.  6.  Lotrimin.  7.  Valium.  8.  Depakote.  9.  Ferosul.  10.  Folvite.  11.  Advil.   12.  Levocarnitine.  13.  Claritin.  14.  Remeron.  15.  Multivitamin.  16.  Protonix.  17.  Metamucil.  18.  Senokot .  19.  Kenalog.    ALLERGIES:  ACTIFED, ANTIHISTAMINE, DOXYCYCLINE, MORPHINE, PSEUDOEPHEDRINE AND PARASYMPATHETICS.    SOCIAL HISTORY:  He lives at the group home, has never smoked, does not drink alcohol or use other drugs.    FAMILY HISTORY:  Noncontributory.    REVIEW OF SYSTEMS:  A 10-point review of systems is positive for his right hip pain.    PHYSICAL EXAMINATION:    He is a cachectic appearing man with spasms in both upper extremity.. He is nonverbal, but seemed to respond to your voice.  His skin is clean, dry and intact.  Radial pulses intact.  Radial, ulnar and median nerve sensation and function appear to be intact.  He does not appear to have any tenderness or crepitus with palpation in either upper extremities.  Right and left lower extremity:  Right lower  extremity is short and externally rotated.  Winces and cries out with any movement of his right lower extremity.  Skin is otherwise clean, dry and intact.  Palpable dorsalis pedis pulses.  He is able to wiggle his ankles and toes.  Left lower extremity skin is clean, dry and intact.  No tenderness to palpation or crepitus and has full range of motion of the hip, knee and ankle.    RADIOGRAPHIC DATA:  AP pelvis and left lateral hip reveal a right comminuted intertrochanteric femur fracture.  X-rays of his right foot reveal postoperative changes of previous midfoot fusion.  X-rays of his right tibia and fibula show no evidence of fracture.  Head CT shows no evidence of fracture.  A CT scan of the C-spine shows old nonunited dens fracture in stable position.    LABORATORY DATA:  On admission, sodium is 141, potassium 3.9, creatinine was 0.6.  White blood cell count 13.2, hemoglobin 9.4.  INR is 1.2.    IMPRESSION AND PLAN:  Mr. Lackey is a 65-year-old gentleman with a right comminuted intertrochanteric femur fracture.  He would benefit from open reduction and internal fixation.  We are attempting to contact his legal guardian for decision making to proceed with surgery.  I recommend internal fixation of the fracture with an intramedullary hip screw.  We will make arrangements for surgery as soon as the patient is medically cleared and consented for surgery.    Umair Caceres MD        D: 2021   T: 2021   MT: SPMT    Name:     GRAHAM LACKEY  MRN:      [id number]        Account:      [id number]   :      1955           Consult Date: 2021     Document: [id number]

## 2021-05-30 NOTE — PLAN OF CARE
7PM-7AM RN     Patient vital signs are at baseline: Yes  Patient able to ambulate as they were prior to admission or with assist devices provided by therapies during their stay:  No. Bedrest.   Patient MUST void prior to discharge:  Yes.  Patient able to tolerate oral intake: Unsure. NPO & will not take PO meds.   Pain has adequate pain control using Oral analgesics:  Appears to. Managed with PRN IV dilaudid x1.     Slept well most of shift. Non-verbal from group home. Does not tolerate being touched, and will not cooperate in taking oral meds or cares. Brief in place, incontinent. Information from group home in chart. Available 24/7. Representative who was with patient when he fell was new(er) to facility and did not know patient entirely well.

## 2021-05-30 NOTE — ED NOTES
[facility]  ED Nurse Handoff Report    Gigi Lackey is a 65 year old male   ED Chief complaint: Ankle Pain  . ED Diagnosis:   Final diagnoses:   Fall, initial encounter   Right femoral fracture (H)   Closed odontoid fracture, initial encounter (H) - Chronic appearing     Allergies:   Allergies   Allergen Reactions     Actifed [Allerfrim]      Antihistamines, Chlorpheniramine-Type [Alkylamines]      Doxycycline      Morphine      Pseudoephedrine      Sympathomimetics        Code Status: DNR/DNI  Activity level - Baseline/Home:  Assist X 1. Activity Level - Current:   Total Care. Lift room needed: No. Bariatric: No   Needed: No   Isolation: No. Infection: Not Applicable  COVID r/o and special precautions.     Vital Signs:   Vitals:    05/29/21 2140 05/29/21 2145 05/29/21 2150 05/29/21 2155   BP:       Pulse:       Resp:       Temp:       TempSrc:       SpO2: 91% 91% 99% 100%   Weight:           Cardiac Rhythm:  ,      Pain level:    Patient confused: Yes. Patient Falls Risk: Yes.   Elimination Status: Has voided - incontinence  Patient Report - Initial Complaint: Unable to ambulate. Unknown leg pain. Focused Assessment: RLE shortening and rotation, pain with movement. Non verbal patient, does not like being touch. Unable to cooperate. Has not attempted to climb out of bed.   Tests Performed:   Labs Ordered and Resulted from Time of ED Arrival Up to the Time of Departure from the ED   CBC WITH PLATELETS DIFFERENTIAL - Abnormal; Notable for the following components:       Result Value    WBC 13.2 (*)     RBC Count 2.98 (*)     Hemoglobin 9.4 (*)     Hematocrit 30.3 (*)      (*)     MCHC 31.0 (*)     Absolute Neutrophil 11.3 (*)     All other components within normal limits   INR   PARTIAL THROMBOPLASTIN TIME   BASIC METABOLIC PANEL   SARS-COV-2 (COVID-19) VIRUS RT-PCR   VALPROIC ACID   PERIPHERAL IV CATHETER   ABO/RH TYPE AND SCREEN     Labs sent at 2157.   XR Tibia & Fibula Right 2  Telephone Encounter by Manda Albert RMA at 09/14/18 03:37 PM     Author:  Manda Albert RMA Service:  (none) Author Type:  Medical Assistant     Filed:  09/14/18 03:37 PM Encounter Date:  9/14/2018 Status:  Signed     :  Manda Albert RMA (Medical Assistant)            Left message on answering machine to call back.  Electronically Signed by:    DARVIN Vincent , 9/14/2018[EP1.1T]        Revision History        User Key Date/Time User Provider Type Action    > EP1.1 09/14/18 03:37 PM Manda Albert RMA Medical Assistant Sign    T - Template             Views   Final Result   Addendum 1 of 1   ADDENDUM dictated by Dr. Bassam Beard, 05/29/2021.   Original report dictated by Dr. Bassam Beard, 05/29/2021      The following report supersedes the previously distributed report. Changes    have been made to the original report as indicated by the double    parentheses ((   )).  Please disregard previous report.      EXAM: XR TIBIA and FIBULA RT 2 VW   LOCATION: [facility]   DATE/TIME: 5/29/2021 7:37 PM      INDICATION: Leg pain after a fall.   COMPARISON: ((Foot radiographs, same date.))      IMPRESSION: Generalized demineralization. No fracture identified in the    tibia or fibula. Normal knee and ankle joint alignment. Generalized    atrophy/volume loss of the musculature in the calf.         Final      XR Pelvis w Hip Right 1 View   Final Result   IMPRESSION:       Acute right femoral fracture, involving the basicervical femoral neck, likely extending into the greater trochanter. Possible but not definite extension into the lesser trochanter. Moderate fracture angulation, no significant fracture displacement. The    femoral head articular surface remains intact.      The right hip is developmentally dysplastic, but joint spacing is maintained without significant degenerative arthritic changes. No hip subluxation or dislocation.      Left hip is similarly developmentally dysplastic without significant degenerative changes. No left hip joint malalignment.      Bones are demineralized and gracile, consistent with chronic disuse.      Soft tissue swelling present about the proximal right femur.      XR Foot Right 2 Views   Final Result   IMPRESSION:       Generalized demineralization. No definite acute fracture. Oblique lucency overlying the lateral base of the distal phalanx in the right great toe is suspected to represent a summation shadow. Normal joint alignment with no acute subluxation. Moderate    degenerative arthritic changes in the  ankle/tibiotalar joint. Fusion of the subtalar joint, either postsurgical or developmental, or due to relative disuse. Arthrodesis of the first TMT joint and between the first and second metatarsal bases with screws.    No hardware loosening or complication.      Cervical spine CT w/o contrast   Final Result   IMPRESSION:   HEAD CT:   1.  No acute intracranial process.      CERVICAL SPINE CT:   1.  Fracture at the base of the dens with cortication of fracture fragments suggesting chronicity.   2.  No definite acute fracture.   3.  Degenerative changes, as described.      Head CT w/o contrast   Final Result   IMPRESSION:   HEAD CT:   1.  No acute intracranial process.      CERVICAL SPINE CT:   1.  Fracture at the base of the dens with cortication of fracture fragments suggesting chronicity.   2.  No definite acute fracture.   3.  Degenerative changes, as described.         Abnormal Results: Labs pending. Right femur fracture.   Treatments provided: Versed for treatment, diagnostics, pain pill  Family Comments: Caregiver at bedside  OBS brochure/video discussed/provided to patient:  N/A  ED Medications:   Medications   oxyCODONE (ROXICODONE) tablet 5 mg (5 mg Oral Given 5/29/21 1825)   midazolam 5 mg/mL (VERSED) intranasal solution 7.5 mg (7.5 mg Intranasal Given 5/29/21 2135)     Drips infusing:  No  For the majority of the shift, the patient's behavior Green. Interventions performed were n/a.    Sepsis treatment initiated: No     Patient tested for COVID 19 prior to admission: YES    ED Nurse Name/Phone Number: Caleb Horner RN,   10:00 PM    RECEIVING UNIT ED HANDOFF REVIEW    Above ED Nurse Handoff Report was reviewed: YES  Reviewed by: Claire Lovelace RN on May 29, 2021 at 10:13 PM

## 2021-05-30 NOTE — OP NOTE
Procedure Date: 05/30/2021    PREOPERATIVE DIAGNOSIS:  Right intertrochanteric femur fracture.    POSTOPERATIVE DIAGNOSIS:  Right intertrochanteric femur fracture.    PROCEDURE:  Internal fixation of right intertrochanteric femur fracture.    SURGEON:  Umair Caceres MD.    FIRST ASSISTANT:  Brenda Mares PA-C.    INDICATIONS FOR SURGERY:  Mr. Lackey is a very pleasant 65-year-old gentleman with severe developmental delay, is nonverbal, ambulates with a gait aid.  He fell in a group home yesterday, suffering a right intertrochanteric femur fracture.  He was taken to [facility], where he was admitted for definitive care.  We had a long discussion with him and his power of  and decision maker for healthcare regarding treatment options.  I recommended internal fixation for palliative care and increased mobilization.  They understand and wished to proceed with surgery.    NARRATIVE EVENTS:  After thorough evaluation and proper identification of the extremity to be operated, Mr. Lackey was taken to the operating room.  He underwent general anesthetic, placed supine on the fracture table, and the right leg was prepped and draped in the usual sterile manner.  After appropriate surgical pause, confirmed the extremity to be operated on, and after confirming that the patient received 2 grams of Ancef.  Right hip was reduced under biplanar fluoroscopy to an anatomic reduction.  At this point, we then prepped and draped the leg in the usual sterile manner.  After appropriate surgical pause, to confirm the patient's extremity to be operating on and after received 2 grams of Ancef.  After confirming the patient's extremity to be operated on, his right hip was approached through a lateral incision centered over the greater trochanter in line with the femur just proximal to the greater trochanter.  Skin and soft tissue sharply dissected down to the tensor fascia.  Fascia was split in line with the fibers in  line with femur, taken down the trochanteric bursa, and the tip of the greater trochanter was palpated.  We placed our initial entry pin such that it was centered in the tip of the greater trochanter on AP view of the femur and centered in the femoral canal on the lateral view of the femur.  We then overreamed this with the initial entry reamer.  We then passed our guidewire down the femur and reamed up to 13 mm to fit an 11 mm in diameter by 170 mm in length Synthes trochanteric femoral nail with a 130 neck angle.  We passed this into position.  Once this was done, we then made a small incision laterally along the femur.  Here, we placed our pin, using the aiming arm for our lag screw such that the lag screw was centered in the femoral head on AP and lateral views of the femur.  We measured and placed an 80 mm TFN lag screw.  Once this was done, we locked this with the set screw, backing that off to allow for some controlled compression and then placed a distal interlocking screw through that same small nick incision laterally on the femur.  Once this screw was in place, we then removed the aiming arm.  We thoroughly irrigated the wound to confirm the fracture reduction and hardware placement using biplanar fluoroscopy.  We closed the tensor fascia with a running 0 Vicryl suture and closed the skin with absorbable sutures and staples in the skin.  The patient was placed in a well-padded postop dressing, taken to the recovery room in stable condition.  He tolerated the procedure without difficulty.  We will allow him to be weightbearing as tolerated, and he will be on 2 weeks of DVT prophylaxis using low-dose Lovenox, followed by 4 weeks with aspirin.    Umair Caceres MD        D: 2021   T: 2021   MT: marino    Name:     GRAHAM MASON  MRN:      [id number]        Account:        [id number]   :      1955           Procedure Date: 2021     Document: [id number]

## 2021-05-30 NOTE — H&P
[facility] Hospitalist Admission Note  Name: Gigi Lackey    MRN: [id number]  YOB: 1955    Age: 65 year old  Date of admission: 5/29/2021  Primary care provider: Jimbo Willis        Assessment & Plan   Gigi Lackey is a 65 year old male with past medical history of phenylketonuria, mental development disorder, epilepsy, GERD, anemia, who is nonverbal at baseline and a state aponte with guardianship, who presented from group home after a witnessed fall with right lower extremity pain and inability to bear weight.   In the ED afebrile, /94, pulse 90, RR 20, oxygen saturation not recorded.   Imaging in the ED included negative NCHCT. CT cervical spine showed fracture at the base of the dens with cortication of fracture fragments suggestive of chronic appearance.  CT C-spine was reviewed with neurosurgery service who agreed non to be an acute finding and did not recommend C- Collar.     Given guarding of right lower extremity imaging of the pelvis, right hip, tibia and fibula as well as foot was pursued in the emergency department.  Pelvis x-ray showed acute fracture of right femoral neck extending likely into the greater trochanter, with possible extension into the lesser trochanter, moderate fracture angulation, developmentally dysplastic hips bilaterally with demineralized bones and soft tissue swelling that the right proximal femur.  Discussed with Herber Carter PA-C in the ED, full chart review including lab work, imaging, and vital signs were reviewed. Patient received 5 mg oral oxycodone in the ED. patient's case and presentation was discussed with on-call orthopedic surgery.  Admission was requested to hospitalist service for further cares and monitoring with orthopedic surgery consult for definitive treatment.  There was difficulty initially getting IV access in the patient from the emergency department.  Discussed with ED provider if successful IV access obtained and  vitally stable, able to transfer to the floor for ongoing care.       1. Right Intertrochanteric Femur Fracture -  Mechanical Fall -  Pt presented with pain and inability to bear weight in the right lower extremity. At baseline ambulates with gait belt, WC for longer distances.  In terms of surgical risk at the patient has no known history of cardiac disease. He carries risk of undergoing surgery given his multiple co morbidities. Has had prior surgery without known issues with anesthesia per report.   -Orthopedic surgery consult  -N.p.o. after midnight  -NWB status  -pre op lab work pending, EKG without acute ST changes  -IV maintenance fluids prior to surgery as p.o. intake has been limited  -Analgesia as needed, tolerated oxycodone in the ED. Per chart review documented reaction to morphine, is listed as an allergy.  We may need to give IV narcotics in his case and recommend close monitoring for allergic type symptoms if needing to give morphine derivatives for pain.    Addendum:   -Attempted to contact the patient's legal guardian Rosita Simmons, no response.  Patient will not be able to have surgery without consent from legal guardianship and this should be addressed again in the morning.  SW consulted.    -Pre -op lab work now returned notable for mild leukocytosis. Could be secondary to stress de margination vs infection. Is currently being treated with Augmentin for suspected pneumonia PTA. Continue to monitor oxygen saturation.  Chest x-ray should be done preop.  Complete dose of Augmentin tonight monitor for other infectious symptoms and expand work-up if febrile or concerns arise.  -spot check O2  -preop CXR  -PICC may be required for lab draws and IV access as pt resistant to cares    2. Dens Fracture - not acute. CT cervical spine showed fracture at the base of the dens with cortication of fracture fragments suggestive of chronic appearance.  Not noted on prior imaging. CT C-spine was reviewed with  "neurosurgery service who agreed non to be an acute finding and did not recommend C- Collar.     3.  Developmental delay  History of Phenylketonuria - Currently calm but resistant to exam and interventions. Patient is a state aponte and DNR/DNI but care is to restorative.  At risk for delirium.  Nonverbal at baseline but can apparently make somewhat make basic needs known with hand gestures.  -bedside attendant as needed  -fall precautions  -reordered 15 mg at bedtime Zyprexa that he is on PTA  -haldol prn     4.  History of seizure disorder -  Chronically on Depakote.  No recent epileptic activity or prior to fall noted by staff.  -Resume PTA Depakote  -Depakote level pending    5. Dysphagia -  Past swallow study in 2018 showed aspiration with all consistencies. Per documentation care team decided to continue with his thickened dysphagia diet which adds to his quality of life despite ongoing aspiration. He is not a feeding tube candidate.  -DD3 diet, thickened liquids when diet is advanced  -aspiration precautions     6. Anemia  Prior Hgb around 11.7 when checked in January. History of chronic anemia. No reported bleeding symptoms.   -resume ferrous sulfate  -monitor hgb post op   -type and cross, CBC    DVT Prophylaxis: Pneumatic Compression Devices  Code Status: DNR / DNI     Expected discharge: 2-3 days pending surgical plan and PT, SW consult for disposition assistance. ,   COVID PCR STATUS: Pending, asymptomatic. No precautions. Had COVID 19 Infection in 10/2020 per chart review.     Discussed with Dr. Eisenberg.    Melani Dietz PA-C    Primary Care Physician   Jimbo Willis    Chief Complaint   \"ankle pain\"    Unable to obtain a history from the patient due to non verbal status and intellectual disability.  History obtained from discussion with the ED provider and group home caregiver, present on admission.      History of Present Illness   Gigi Lackey is a 65 year old male with past medical history " of phenylketonuria, mental development disorder, epilepsy, GERD, anemia, who is nonverbal at baseline and a state aponte with guardianship, who presented from group home after a fall.    Group home staff member was assisting Mr. Lackey down several steps out onto the patio earlier today when the patient tripped, losing his footing.  Staff member was utilizing a waist gait belt as they usually do to help immobilize so both the staff member and patient fell.  Staff member was unable to catch his fall or visualize exactly how the patient had landed but thought he had fallen on the concrete.  There was no witnessed LOC or seizure like activity prior to fall.  Other staff members from the group home were able to assist the patient into a chair.  He was noted to be in pain with nonverbal signals of agitation which is typical for him when he is in pain.  EMS were called to the group home as he was unable to bear weight in his right lower extremity.  He was splinted and taken to the emergency department for further evaluation.    Group home member, Micah, is present on admission evaluation who witnessed the fall.  She states that patient has not been known to be acting altered prior to the fall.  He typically is in a wheelchair for longer outings, requires assistance to ambulate at baseline.  She also notes that over the past 1 to 2 weeks he has had a low appetite and been coughing after eating thus he was started on a course of Augmentin which she has about 1 and half days for suspected pneumonia.  No fevers have been noted at the group home.      Past Medical History    I have reviewed this patient's medical history and updated it with pertinent information if needed.   Past Medical History:   Diagnosis Date     Anemia      Epilepsy (H)      Gastro-oesophageal reflux disease      Osteoporosis      Pervasive developmental disorder      Profound intellectual disability        Past Surgical History   I have reviewed this  patient's surgical history and updated it with pertinent information if needed.  Past Surgical History:   Procedure Laterality Date     ORTHOPEDIC SURGERY      ankle       Prior to Admission Medications   Prior to Admission Medications   Prescriptions Last Dose Informant Patient Reported? Taking?   Escitalopram Oxalate (LEXAPRO PO)   Yes No   Sig: Take 15 mg by mouth daily    OLANZapine (ZYPREXA) 15 MG tablet   Yes No   Sig: Take 15 mg by mouth At Bedtime    acetaminophen (ACETAMINOPHEN 8 HOUR) 650 MG CR tablet   Yes No   Sig: Take 1,300 mg by mouth 2 times daily   acetaminophen (TYLENOL) 325 MG tablet   Yes No   Sig: Take 650 mg by mouth every 4 hours as needed for fever or pain   calcium carbonate-vitamin D (OS-CHAUNCEY) 500-400 MG-UNIT tablet   Yes No   Sig: Take 1 tablet by mouth 2 times daily   carbamide peroxide (DEBROX) 6.5 % otic solution   Yes No   Sig: Place 4 drops into both ears Daily for 5 days of each month. Begin on the first Adrian of each month.   clotrimazole (LOTRIMIN) 1 % external cream   Yes No   Sig: Apply topically 2 times daily as needed   diazepam (VALIUM) 5 MG tablet   Yes No   Sig: Take 7.5 mg by mouth as needed (90 minutes prior to medical appointments)   divalproex sodium delayed-release (DEPAKOTE) 250 MG DR tablet   No No   Sig: Take 1 tablet (250 mg) by mouth 2 times daily   ferrous sulfate (FEROSUL) 325 (65 Fe) MG tablet   Yes No   Sig: Take 325 mg by mouth three times a week (Monday, Wednesday, Friday)   folic acid (FOLVITE) 1 MG tablet   Yes No   Sig: Take 1 mg by mouth daily   ibuprofen (ADVIL/MOTRIN) 200 MG tablet   Yes No   Sig: Take 600 mg by mouth every 6 hours as needed for fever or pain   levOCARNitine (CARNITOR) 1 GM/10ML solution   Yes No   Sig: Take 330 mg by mouth 3 times daily   loratadine (CLARITIN) 10 MG tablet   Yes No   Sig: Take 10 mg by mouth daily as needed for allergies   mirtazapine (REMERON) 15 MG tablet   Yes No   Sig: Take 15 mg by mouth At Bedtime   multivitamin  w/minerals (MULTI-VITAMIN) tablet   Yes No   Sig: Take 1 tablet by mouth daily   pantoprazole (PROTONIX) 40 MG EC tablet   Yes No   Sig: Take 40 mg by mouth 2 times daily   psyllium (METAMUCIL/KONSYL) 58.6 % powder   Yes No   Sig: Take 1 Tablespoonful by mouth every morning   senna (SENOKOT) 8.6 MG tablet   Yes No   Sig: Take 1 tablet by mouth daily   triamcinolone (KENALOG) 0.1 % external cream   Yes No   Sig: Apply topically 2 times daily as needed for irritation      Facility-Administered Medications: None     Allergies   Allergies   Allergen Reactions     Actifed [Allerfrim]      Antihistamines, Chlorpheniramine-Type [Alkylamines]      Doxycycline      Morphine      Pseudoephedrine      Sympathomimetics        Social History   I have reviewed this patient's social history and updated it with pertinent information if needed. Gigi Lackey  reports that he has never smoked. He does not have any smokeless tobacco history on file. He reports that he does not drink alcohol or use drugs.    Family History   Reviewed, non contributory in this case.     Review of Systems   The 10 point Review of Systems is negative other than noted in the HPI or here.     Physical Exam   Temp: 97.3  F (36.3  C) Temp src: Temporal BP: (!) 163/94 Pulse: 90   Resp: 20        Vital Signs with Ranges  Temp:  [97.3  F (36.3  C)] 97.3  F (36.3  C)  Pulse:  [90] 90  Resp:  [20] 20  BP: (163)/(94) 163/94  0 lbs 0 oz    EXAM LIMITED DUE TO INTERMITTENT LACK OF PATIENT COOPERATION     Constitutional: Awake, chronically ill appearing. Lying in stretcher, uncomfortable appearing and anxious posture.  Eyes: Right eye EOM intact, left eye enucleated.   HEENT: Superficial skin abrasion forehead with dried surrounding blood. Dry mucous membranes, absent dentition.  Respiratory: Symmetrical chest rise with inhalation.  Clear to auscultation bilaterally, no crackles or wheezing.  Cardiovascular: Regular rate and rhythm, normal S1 and S2, and no murmur  noted.  GI: Soft, non-distended, non-tender, bowel sounds present. No rebound tenderness or guarding.  Lymph/Hematologic: No anterior cervical or supraclavicular adenopathy.  Skin: Warm, dry. No edema.  Musculoskeletal:Contracted left upper extremity, full ROM in right upper extremity. Hand gestures.  no gross deformities noted.  Spontaneously moving left lower extremity in bed.  Shortened and rotated right lower extremity, distal pulses and capillary refill intact.  No acute joint synovitis of bilateral knees. Patient unable to verbalize sensation.  Neurologic: No tremor. Speech is clear. Moving all extremities with symmetrical strength. CN 2-12 grossly intact.  Coordination and sensation intact.   Psychiatric: unable to assess. Calm.     Data   Data reviewed today:        Imaging:   Recent Results (from the past 24 hour(s))   Head CT w/o contrast    Narrative    EXAM: CT HEAD W/O CONTRAST, CT CERVICAL SPINE W/O CONTRAST  LOCATION: [facility]  DATE/TIME: 5/29/2021 6:35 PM    INDICATION: Head and neck injury  COMPARISON: CT head 09/17/2019, CT cervical spine 06/05/2016  TECHNIQUE:   1) Routine CT Head without IV contrast. Multiplanar reformats. Dose reduction techniques were used.  2) Routine CT Cervical Spine without IV contrast. Multiplanar reformats. Dose reduction techniques were used.    FINDINGS:   HEAD CT:   INTRACRANIAL CONTENTS: No intracranial hemorrhage, extraaxial collection, or mass effect.  No CT evidence of acute infarct. Moderate presumed chronic small vessel ischemic changes. Moderate generalized volume loss. No hydrocephalus.     VISUALIZED ORBITS/SINUSES/MASTOIDS: Partially calcified shrunken and left optic globe, similar to prior. No paranasal sinus mucosal disease. No middle ear or mastoid effusion.    BONES/SOFT TISSUES: No acute abnormality.    CERVICAL SPINE CT:   VERTEBRA: Study significantly affected by motion artifact. Slight anterior subluxation C4 on C5 and C5 on C6.  Fracture at the base of the dens; new since prior, however cortication of fracture fragments suggests a chronic fracture. No definite other   fractures. Mild left cervical curve.    CANAL/FORAMINA: Multilevel degenerative changes without significant canal narrowing at any level. Neural foraminal narrowing: Mild bilateral at C2-C3, moderate right and mild left at C3-C4, moderate right at C5-C6, moderate left at C6-C7.    PARASPINAL: No extraspinal abnormality. Visualized lung fields are clear.      Impression    IMPRESSION:  HEAD CT:  1.  No acute intracranial process.    CERVICAL SPINE CT:  1.  Fracture at the base of the dens with cortication of fracture fragments suggesting chronicity.  2.  No definite acute fracture.  3.  Degenerative changes, as described.   Cervical spine CT w/o contrast    Narrative    EXAM: CT HEAD W/O CONTRAST, CT CERVICAL SPINE W/O CONTRAST  LOCATION: [facility]  DATE/TIME: 5/29/2021 6:35 PM    INDICATION: Head and neck injury  COMPARISON: CT head 09/17/2019, CT cervical spine 06/05/2016  TECHNIQUE:   1) Routine CT Head without IV contrast. Multiplanar reformats. Dose reduction techniques were used.  2) Routine CT Cervical Spine without IV contrast. Multiplanar reformats. Dose reduction techniques were used.    FINDINGS:   HEAD CT:   INTRACRANIAL CONTENTS: No intracranial hemorrhage, extraaxial collection, or mass effect.  No CT evidence of acute infarct. Moderate presumed chronic small vessel ischemic changes. Moderate generalized volume loss. No hydrocephalus.     VISUALIZED ORBITS/SINUSES/MASTOIDS: Partially calcified shrunken and left optic globe, similar to prior. No paranasal sinus mucosal disease. No middle ear or mastoid effusion.    BONES/SOFT TISSUES: No acute abnormality.    CERVICAL SPINE CT:   VERTEBRA: Study significantly affected by motion artifact. Slight anterior subluxation C4 on C5 and C5 on C6. Fracture at the base of the dens; new since prior, however  cortication of fracture fragments suggests a chronic fracture. No definite other   fractures. Mild left cervical curve.    CANAL/FORAMINA: Multilevel degenerative changes without significant canal narrowing at any level. Neural foraminal narrowing: Mild bilateral at C2-C3, moderate right and mild left at C3-C4, moderate right at C5-C6, moderate left at C6-C7.    PARASPINAL: No extraspinal abnormality. Visualized lung fields are clear.      Impression    IMPRESSION:  HEAD CT:  1.  No acute intracranial process.    CERVICAL SPINE CT:  1.  Fracture at the base of the dens with cortication of fracture fragments suggesting chronicity.  2.  No definite acute fracture.  3.  Degenerative changes, as described.   XR Foot Right 2 Views    Narrative    EXAM: XR FOOT RIGHT 2 VIEWS  LOCATION: [facility]  DATE/TIME: 5/29/2021 7:37 PM    INDICATION: Right ankle pain after a fall.  COMPARISON: Tibia/fibular radiographs, same date.      Impression    IMPRESSION:     Generalized demineralization. No definite acute fracture. Oblique lucency overlying the lateral base of the distal phalanx in the right great toe is suspected to represent a summation shadow. Normal joint alignment with no acute subluxation. Moderate   degenerative arthritic changes in the ankle/tibiotalar joint. Fusion of the subtalar joint, either postsurgical or developmental, or due to relative disuse. Arthrodesis of the first TMT joint and between the first and second metatarsal bases with screws.   No hardware loosening or complication.   XR Pelvis w Hip Right 1 View    Narrative    EXAM: XR PELVIS AND HIP RIGHT 1 VIEW  LOCATION: [facility]  DATE/TIME: 5/29/2021 7:37 PM    INDICATION: Right leg pain after a fall  COMPARISON: None.      Impression    IMPRESSION:     Acute right femoral fracture, involving the basicervical femoral neck, likely extending into the greater trochanter. Possible but not definite extension into the lesser  trochanter. Moderate fracture angulation, no significant fracture displacement. The   femoral head articular surface remains intact.    The right hip is developmentally dysplastic, but joint spacing is maintained without significant degenerative arthritic changes. No hip subluxation or dislocation.    Left hip is similarly developmentally dysplastic without significant degenerative changes. No left hip joint malalignment.    Bones are demineralized and gracile, consistent with chronic disuse.    Soft tissue swelling present about the proximal right femur.   XR Tibia & Fibula Right 2 Views    Addendum: 5/29/2021    ADDENDUM dictated by Dr. Bassam Beard, 05/29/2021.  Original report dictated by Dr. Bassam Beard, 05/29/2021    The following report supersedes the previously distributed report. Changes have been made to the original report as indicated by the double parentheses ((   )).  Please disregard previous report.    EXAM: XR TIBIA and FIBULA RT 2 VW  LOCATION: [facility]  DATE/TIME: 5/29/2021 7:37 PM    INDICATION: Leg pain after a fall.  COMPARISON: ((Foot radiographs, same date.))    IMPRESSION: Generalized demineralization. No fracture identified in the tibia or fibula. Normal knee and ankle joint alignment. Generalized atrophy/volume loss of the musculature in the calf.        Narrative    EXAM: XR TIBIA and FIBULA RT 2 VW  LOCATION: [facility]  DATE/TIME: 5/29/2021 7:37 PM    INDICATION: Leg pain after a fall.  COMPARISON: Ankle and foot radiographs, same date.      Impression    IMPRESSION: Generalized demineralization. No fracture identified in the tibia or fibula. Normal knee and ankle joint alignment. Generalized atrophy/volume loss of the musculature in the calf.       No lab results found in last 7 days.    Melani Dietz PA-C on 5/29/2021 at 8:22 PM

## 2021-05-31 ENCOUNTER — APPOINTMENT (OUTPATIENT)
Dept: PHYSICAL THERAPY | Facility: CLINIC | Age: 66
DRG: 480 | End: 2021-05-31
Payer: MEDICARE

## 2021-05-31 LAB
ANION GAP SERPL CALCULATED.3IONS-SCNC: 2 MMOL/L (ref 3–14)
BUN SERPL-MCNC: 11 MG/DL (ref 7–30)
CALCIUM SERPL-MCNC: 8.8 MG/DL (ref 8.5–10.1)
CHLORIDE SERPL-SCNC: 109 MMOL/L (ref 94–109)
CO2 SERPL-SCNC: 32 MMOL/L (ref 20–32)
CREAT SERPL-MCNC: 0.62 MG/DL (ref 0.66–1.25)
ERYTHROCYTE [DISTWIDTH] IN BLOOD BY AUTOMATED COUNT: 13.7 % (ref 10–15)
GFR SERPL CREATININE-BSD FRML MDRD: >90 ML/MIN/{1.73_M2}
GLUCOSE SERPL-MCNC: 110 MG/DL (ref 70–99)
HCT VFR BLD AUTO: 26 % (ref 40–53)
HGB BLD-MCNC: 8 G/DL (ref 13.3–17.7)
INR PPP: 1.23 (ref 0.86–1.14)
MCH RBC QN AUTO: 31.6 PG (ref 26.5–33)
MCHC RBC AUTO-ENTMCNC: 30.8 G/DL (ref 31.5–36.5)
MCV RBC AUTO: 103 FL (ref 78–100)
PLATELET # BLD AUTO: 414 10E9/L (ref 150–450)
POTASSIUM SERPL-SCNC: 4 MMOL/L (ref 3.4–5.3)
RBC # BLD AUTO: 2.53 10E12/L (ref 4.4–5.9)
SODIUM SERPL-SCNC: 143 MMOL/L (ref 133–144)
WBC # BLD AUTO: 11.1 10E9/L (ref 4–11)

## 2021-05-31 PROCEDURE — 99233 SBSQ HOSP IP/OBS HIGH 50: CPT | Performed by: HOSPITALIST

## 2021-05-31 PROCEDURE — 85027 COMPLETE CBC AUTOMATED: CPT | Performed by: ORTHOPAEDIC SURGERY

## 2021-05-31 PROCEDURE — 250N000011 HC RX IP 250 OP 636: Performed by: ORTHOPAEDIC SURGERY

## 2021-05-31 PROCEDURE — 258N000003 HC RX IP 258 OP 636: Performed by: ORTHOPAEDIC SURGERY

## 2021-05-31 PROCEDURE — 80048 BASIC METABOLIC PNL TOTAL CA: CPT | Performed by: ORTHOPAEDIC SURGERY

## 2021-05-31 PROCEDURE — 85610 PROTHROMBIN TIME: CPT | Performed by: ORTHOPAEDIC SURGERY

## 2021-05-31 PROCEDURE — 120N000001 HC R&B MED SURG/OB

## 2021-05-31 PROCEDURE — 250N000011 HC RX IP 250 OP 636: Performed by: PHYSICIAN ASSISTANT

## 2021-05-31 PROCEDURE — 250N000013 HC RX MED GY IP 250 OP 250 PS 637: Performed by: ORTHOPAEDIC SURGERY

## 2021-05-31 PROCEDURE — 97530 THERAPEUTIC ACTIVITIES: CPT | Mod: GP | Performed by: PHYSICAL THERAPIST

## 2021-05-31 PROCEDURE — 97161 PT EVAL LOW COMPLEX 20 MIN: CPT | Mod: GP | Performed by: PHYSICAL THERAPIST

## 2021-05-31 PROCEDURE — 250N000013 HC RX MED GY IP 250 OP 250 PS 637: Performed by: HOSPITALIST

## 2021-05-31 PROCEDURE — 250N000009 HC RX 250: Performed by: ORTHOPAEDIC SURGERY

## 2021-05-31 RX ORDER — ACETAMINOPHEN 325 MG/1
975 TABLET ORAL EVERY 8 HOURS
Qty: 60 TABLET | Refills: 0 | Status: SHIPPED | OUTPATIENT
Start: 2021-05-31 | End: 2021-06-01

## 2021-05-31 RX ORDER — AMOXICILLIN 250 MG
1 CAPSULE ORAL 2 TIMES DAILY
Qty: 60 TABLET | Refills: 0 | Status: SHIPPED | OUTPATIENT
Start: 2021-05-31 | End: 2021-06-01

## 2021-05-31 RX ORDER — OXYCODONE HYDROCHLORIDE 5 MG/1
5 TABLET ORAL EVERY 4 HOURS PRN
Qty: 20 TABLET | Refills: 0 | Status: SHIPPED | OUTPATIENT
Start: 2021-05-31 | End: 2021-06-01

## 2021-05-31 RX ORDER — DIVALPROEX SODIUM 250 MG/1
250 TABLET, DELAYED RELEASE ORAL 2 TIMES DAILY
Status: DISCONTINUED | OUTPATIENT
Start: 2021-05-31 | End: 2021-06-01 | Stop reason: HOSPADM

## 2021-05-31 RX ORDER — CELECOXIB 100 MG/1
100 CAPSULE ORAL 2 TIMES DAILY
Qty: 30 CAPSULE | Refills: 0 | Status: SHIPPED | OUTPATIENT
Start: 2021-05-31 | End: 2021-06-01

## 2021-05-31 RX ADMIN — ENOXAPARIN SODIUM 30 MG: 30 INJECTION SUBCUTANEOUS at 21:17

## 2021-05-31 RX ADMIN — CELECOXIB 100 MG: 100 CAPSULE ORAL at 19:45

## 2021-05-31 RX ADMIN — PANTOPRAZOLE SODIUM 40 MG: 40 TABLET, DELAYED RELEASE ORAL at 09:32

## 2021-05-31 RX ADMIN — FAMOTIDINE 20 MG: 10 INJECTION, SOLUTION INTRAVENOUS at 19:45

## 2021-05-31 RX ADMIN — TAZOBACTAM SODIUM AND PIPERACILLIN SODIUM 3.38 G: 375; 3 INJECTION, SOLUTION INTRAVENOUS at 09:52

## 2021-05-31 RX ADMIN — DOCUSATE SODIUM 50 MG AND SENNOSIDES 8.6 MG 1 TABLET: 8.6; 5 TABLET, FILM COATED ORAL at 09:32

## 2021-05-31 RX ADMIN — DIVALPROEX SODIUM 250 MG: 250 TABLET, DELAYED RELEASE ORAL at 19:44

## 2021-05-31 RX ADMIN — PANTOPRAZOLE SODIUM 40 MG: 40 TABLET, DELAYED RELEASE ORAL at 19:45

## 2021-05-31 RX ADMIN — OLANZAPINE 5 MG: 5 TABLET, ORALLY DISINTEGRATING ORAL at 09:38

## 2021-05-31 RX ADMIN — ENOXAPARIN SODIUM 30 MG: 30 INJECTION SUBCUTANEOUS at 09:52

## 2021-05-31 RX ADMIN — OXYCODONE HYDROCHLORIDE 5 MG: 5 TABLET ORAL at 09:32

## 2021-05-31 RX ADMIN — LEVOCARNITINE 330 MG: 1 SOLUTION ORAL at 21:14

## 2021-05-31 RX ADMIN — ACETAMINOPHEN 975 MG: 325 TABLET, FILM COATED ORAL at 09:27

## 2021-05-31 RX ADMIN — CELECOXIB 100 MG: 100 CAPSULE ORAL at 09:26

## 2021-05-31 RX ADMIN — AMOXICILLIN AND CLAVULANATE POTASSIUM 1 TABLET: 875; 125 TABLET, FILM COATED ORAL at 19:44

## 2021-05-31 RX ADMIN — SODIUM CHLORIDE 250 MG: 9 INJECTION, SOLUTION INTRAVENOUS at 06:35

## 2021-05-31 RX ADMIN — FAMOTIDINE 20 MG: 20 TABLET ORAL at 09:32

## 2021-05-31 RX ADMIN — ESCITALOPRAM 15 MG: 5 TABLET, FILM COATED ORAL at 09:26

## 2021-05-31 RX ADMIN — HYDROMORPHONE HYDROCHLORIDE 0.2 MG: 0.2 INJECTION, SOLUTION INTRAMUSCULAR; INTRAVENOUS; SUBCUTANEOUS at 04:48

## 2021-05-31 RX ADMIN — DOCUSATE SODIUM 100 MG: 100 CAPSULE, LIQUID FILLED ORAL at 09:33

## 2021-05-31 RX ADMIN — LORAZEPAM 1 MG: 2 INJECTION INTRAMUSCULAR; INTRAVENOUS at 20:06

## 2021-05-31 RX ADMIN — LEVOCARNITINE 330 MG: 1 SOLUTION ORAL at 09:38

## 2021-05-31 RX ADMIN — LORAZEPAM 0.5 MG: 2 INJECTION INTRAMUSCULAR; INTRAVENOUS at 00:31

## 2021-05-31 RX ADMIN — TAZOBACTAM SODIUM AND PIPERACILLIN SODIUM 3.38 G: 375; 3 INJECTION, SOLUTION INTRAVENOUS at 02:40

## 2021-05-31 RX ADMIN — FOLIC ACID 1 MG: 1 TABLET ORAL at 09:33

## 2021-05-31 RX ADMIN — CEFAZOLIN 1 G: 1 INJECTION, POWDER, FOR SOLUTION INTRAMUSCULAR; INTRAVENOUS at 05:36

## 2021-05-31 ASSESSMENT — ACTIVITIES OF DAILY LIVING (ADL)
ADLS_ACUITY_SCORE: 37
ADLS_ACUITY_SCORE: 39
ADLS_ACUITY_SCORE: 37

## 2021-05-31 ASSESSMENT — MIFFLIN-ST. JEOR: SCORE: 1142.95

## 2021-05-31 NOTE — PLAN OF CARE
OT: Orders received. Chart reviewed and discussed with care team.  OT not indicated due to pt being dependent in all ADLS at group home, no IP OT needs.  Defer discharge recommendations to care team.  Will complete orders.

## 2021-05-31 NOTE — PROGRESS NOTES
Orthopedic Surgery  Gigi Lackey  2021  Admit Date:  2021  POD 1 Day Post-Op  S/P Procedure(s):  INTERNAL FIXATION, FRACTURE, TROCHANTERIC, RIGHT HIP    Patient is sleeping with a sitter at bedside.  Pain appear controlled. Had been resistant to cares overnight.  Wakes briefly during dressing check, then closes eyes again.    Awake and sleepy  Vital Sign Ranges  Temperature Temp  Av.8  F (36.6  C)  Min: 97  F (36.1  C)  Max: 98.5  F (36.9  C)   Blood pressure Systolic (24hrs), Av , Min:125 , Max:150        Diastolic (24hrs), Av, Min:41, Max:71      Pulse Pulse  Av.1  Min: 82  Max: 101   Respirations Resp  Av.8  Min: 8  Max: 22   Pulse oximetry SpO2  Av.1 %  Min: 94 %  Max: 100 %       Right hip bulky dressing is clean, dry, and intact. Minimal erythema of the surrounding skin.  Bilateral calves are soft.  right lower extremity is NVI.  Does not respond to commands    Labs:  Recent Labs   Lab Test 21  0540 21  21521  1513   POTASSIUM 4.0 3.9 3.8     Recent Labs   Lab Test 21  0540 21  0713 21   HGB 8.0* 9.2* 9.4*     Recent Labs   Lab Test 21  0540 21   INR 1.23* 1.23*     Recent Labs   Lab Test 21  0540 21  2151 21  1122    440 466*       A/P  1. S/p ORIF right IT fracture   Continue Lovenox for DVT prophylaxis x 2weeks then 4 weeks asa.     Mobilize with PT/OT as able per baseline WBAT.     Continue current pain regiment with tylenol.    2. Disposition   Anticipate d/c to SNF in the next few days    Kjerstin L Foss, PA-C

## 2021-05-31 NOTE — PLAN OF CARE
Evening RN    Patient vital signs are at baseline: Yes  Patient able to ambulate as they were prior to admission or with assist devices provided by therapies during their stay:  No,  Reason:  Not oob yet, pt very sleepy from surgery residual this shift.  Patient MUST void prior to discharge:  Yes  Patient able to tolerate oral intake:  No,  Reason:  Pt very sleepy this shift, not awake enough to eat anything.  Will need to be DD3 and thick liquids when can tolerate PO.  Pain has adequate pain control using Oral analgesics:  Yes    Pt very sleepy this shift, awakes to touch and groans/cries out when interacted with.  Nonverbal at baseline.  Confused.  VSS and afebrile.  Pt pain rating score of 0 at rest, falls back asleep snoring quickly when not interacted with - no pain medications have been given since OR at this point (ice applied and repositioning interventions on this unit thus far only).  CMS intact - warm feet w/ good pulses/cap refill.  Dressing CDI.  Pt too sleepy this shift to safely get oob.  Voiding adequately - incontinent of urine at baseline so male condom cath in place.  Pt too sleepy to attempt PO food/fluids yet.  Midline IV WDL - LR 100ml/hr primary, Ancef, valproate, and zosyn.  Plan is return to group [facility] on discharge - RN from group home called tonight for an update and told this RN that someone from  will come and visit him tomorrow.  Will continue to monitor.

## 2021-05-31 NOTE — PROGRESS NOTES
[facility]    Hospitalist Progress Note  Name: Gigi Lackey    MRN: [id number]  Provider:  David Dumont DO, MPH  Date of Service: 05/31/2021    Summary of Stay: Gigi Lackey is a 65 year old male with past medical history of phenylketonuria, mental development disorder, epilepsy, GERD, anemia, who is nonverbal at baseline and a state aponte with guardianship, who presented from group home after a witnessed fall with right lower extremity pain and inability to bear weight.     In the ED afebrile, /94, pulse 90, RR 20, oxygen saturation not recorded.  Imaging in the ED included negative NCHCT. CT cervical spine showed fracture at the base of the dens with cortication of fracture fragments suggestive of chronic appearance.  CT C-spine was reviewed with neurosurgery service who agreed non to be an acute finding and did not recommend C- Collar.      Given guarding of right lower extremity imaging of the pelvis, right hip, tibia and fibula as well as foot was pursued in the emergency department.  Pelvis x-ray showed acute fracture of right femoral neck extending likely into the greater trochanter, with possible extension into the lesser trochanter, moderate fracture angulation, developmentally dysplastic hips bilaterally with demineralized bones and soft tissue swelling that the right proximal femur.    Patient received 5 mg oral oxycodone in the ED.  Patient's case and presentation was discussed with on-call orthopedic surgery.  Admission was requested to hospitalist service for further cares and monitoring with orthopedic surgery consult for definitive treatment.      Underwent uncomplicated internal fixation on 5/30.  Pain appears well controlled.  Unable to participate much in physical therapy due to developmental delay.  Planning to discharge back to group home tomorrow.     1. Right intertrochanteric femur fracture status post internal fixation from mechanical fall  Pt presented with  pain and inability to bear weight in the right lower extremity. At baseline ambulates with gait belt, WC for longer distances. In terms of surgical risk at the patient has no known history of cardiac disease.  He carries risk of undergoing surgery given his multiple co morbidities.  Has had prior surgery without known issues with anesthesia per report.  Underwent uncomplicated internal fixation yesterday.  -Orthopedic surgery consult.  -Activity per orthopedics.  -Analgesia as needed.     2. Dens Fracture    Not acute/new. CT cervical spine showed fracture at the base of the dens with cortication of fracture fragments suggestive of chronic appearance.  Not noted on prior imaging.  CT C-spine was reviewed with neurosurgery service who agreed non to be an acute finding and did not recommend C- Collar.      3.  Developmental delay and history of Phenylketonuria  Currently calm but resistant to exam and interventions. Patient is a state aponte and DNR/DNI but care is to restorative.  At risk for delirium.  Nonverbal at baseline but can apparently make somewhat make basic needs known with hand gestures.  -Bedside attendant as needed.  -Fall precautions.  -Reordered prior to admission medications.     4.  History of seizure disorder  Chronically on Depakote.  No recent epileptic activity or prior to fall noted by staff.  -Resumed PTA medications.  -Depakote level low.     5. Dysphagia  Past swallow study in 2018 showed aspiration with all consistencies. Per documentation care team decided to continue with his thickened dysphagia diet which adds to his quality of life despite ongoing aspiration. He is not a feeding tube candidate.  -DD3 diet, thickened liquids when diet is advanced  -aspiration precautions      6.  Acute blood loss anemia with anemia of chronic disease  Prior Hgb around 11.7 when checked in January.  History of chronic anemia.  No reported bleeding symptoms.   -Hold PTA ferrous sulfate.  -Type and cross,  CBC.    7.  Recent community-acquired pneumonia  -Complete previously prescribed course of Augmentin.    DVT Prophylaxis: Pneumatic Compression Devices  Code Status: Full Code  Diet: Dysphagia Diet Level 3 Advanced Honey Thickened Liquids (pre-thickened or use instant food thickener)    Contreras Catheter: not present  Disposition: Expected discharge in 1 days to home.   Incidental Findings: None.  Family updated today: No     Interval History   Unable to provide history.    -Data reviewed today: I personally reviewed all new labs and imaging results over the last 24 hours.     Physical Exam   Temp: 98  F (36.7  C) Temp src: Temporal BP: 125/67 Pulse: 105   Resp: 20 SpO2: 98 % O2 Device: None (Room air) Oxygen Delivery: 3 LPM  Vitals:    05/29/21 2119   Weight: 49.4 kg (109 lb)     Vital Signs with Ranges  Temp:  [97  F (36.1  C)-98.5  F (36.9  C)] 98  F (36.7  C)  Pulse:  [] 105  Resp:  [8-22] 20  BP: (125-150)/(41-71) 125/67  SpO2:  [94 %-100 %] 98 %  I/O last 3 completed shifts:  In: 1178 [I.V.:1178]  Out: 500 [Urine:500]    GENERAL: No apparent distress. Awake, alert, and not oriented.  HEENT: Normocephalic, atraumatic. Extraocular movements intact.  CARDIOVASCULAR: Regular rate and rhythm without murmurs or rubs. No S3.  PULMONARY: Clear bilaterally.  GASTROINTESTINAL: Soft, non-tender, non-distended. Bowel sounds normoactive.   EXTREMITIES: No cyanosis or clubbing. No edema.  NEUROLOGICAL: CN 2-12 grossly intact, no focal neurological deficits.  DERMATOLOGICAL: No rash, ulcer, bruising, nor jaundice.     Medications     lactated ringers 100 mL/hr at 05/30/21 2511     - MEDICATION INSTRUCTIONS -         acetaminophen  975 mg Oral Q8H     celecoxib  100 mg Oral BID     docusate sodium  100 mg Oral BID     enoxaparin ANTICOAGULANT  30 mg Subcutaneous Q24H     escitalopram  15 mg Oral Daily     famotidine  20 mg Oral BID    Or     famotidine  20 mg Intravenous BID     folic acid  1 mg Oral Daily      levOCARNitine  330 mg Oral TID     mirtazapine  15 mg Oral At Bedtime     OLANZapine  15 mg Oral At Bedtime     pantoprazole  40 mg Oral BID     piperacillin-tazobactam  3.375 g Intravenous Q8H     polyethylene glycol  17 g Oral Daily     senna-docusate  1 tablet Oral BID     sodium chloride (PF)  10 mL Intracatheter Q8H     sodium chloride (PF)  3 mL Intracatheter Q8H     sodium chloride (PF)  3 mL Intracatheter Q8H     valproate (DEPACON) in NS intermittent infusion  250 mg Intravenous Q12H     Data     Laboratory:  Recent Labs   Lab 05/31/21  0540 05/30/21  0713 05/29/21  2151   WBC 11.1*  --  13.2*   HGB 8.0* 9.2* 9.4*   HCT 26.0*  --  30.3*   *  --  102*     --  440     Recent Labs   Lab 05/31/21  0540 05/30/21  1840 05/29/21  2151     --  141   POTASSIUM 4.0  --  3.9   CHLORIDE 109  --  107   CO2 32  --  31   ANIONGAP 2*  --  3   *  --  110*   BUN 11  --  16   CR 0.62* 0.54* 0.60*   GFRESTIMATED >90 >90 >90   GFRESTBLACK >90 >90 >90   CHAUNCEY 8.8  --  9.3     No results for input(s): CULT in the last 168 hours.    Imaging:  Recent Results (from the past 24 hour(s))   XR Surgery FABRICE L/T 5 Min Fluoro w Stills    Narrative    This exam was marked as non-reportable because it will not be read by a   radiologist or a [facility] non-radiologist provider.               David Dumont DO MPH  [facility] Hospitalist  [address] [place] [address]  05/31/2021

## 2021-05-31 NOTE — PLAN OF CARE
Pt nonverbal, ADONIS orientation. IV Dilaudid x1 and IV Ativan given x1. Pt groans and attempts to hit staff with any assessment or repositioning. Needing 3-4 people to hold patient during condom catheter change. Good urinary output. Refused any PO meds, and spit out attempted bite of apple sauce. LR infusing. Sitter noticed pt physically hitting himself in the right cheek area, mitts applied. Dressing is CDI. Midline patent. Did not attempt to ambulate patient.

## 2021-05-31 NOTE — PROGRESS NOTES
05/31/21 0800   Quick Adds   Type of Visit Initial PT Evaluation   Living Environment   People in home facility resident   Current Living Arrangements group home   Home Accessibility wheelchair accessible   Living Environment Comments  Pt lives in a group home with ramp to enter   Self-Care   Usual Activity Tolerance moderate   Current Activity Tolerance fair   Regular Exercise No   Equipment Currently Used at Home wheelchair, manual   Activity/Exercise/Self-Care Comment Per discussion with staff at [facility], Gigi required total assist with all ADLs.  Pt able to stand to shower and to get up from toilet with staff performing hygiene and dressing.  Pt ambulated short distances in the home without a device but was unsteady at baseline.  Pt wears a gait belt all day for staff to assist him with mobility.  Pt also uses a WC for longer distances.   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 Santiago Caceres   Patient/Family Therapy Goals Statement (PT) Pt unable to state   Pertinent History of Current Problem (include personal factors and/or comorbidities that impact the POC) Gigi Lackey is a 65 year old male with past medical history of phenylketonuria, mental development disorder, epilepsy, GERD, anemia, who is nonverbal at baseline and a state aponte  with guardianship, who presented from group home after a witnessed fall with right lower extremity pain and inability to bear weight.  Pt sustained a R hip fx.  S/P ORIF   Existing Precautions/Restrictions fall   Weight-Bearing Status - RLE weight-bearing as tolerated   Cognition   Affect/Mental Status (Cognition) agitated   Follows Commands (Cognition) does not follow one-step commands   Behavioral Issues combative/physical outbursts;verbal outbursts   Cognitive Status Comments Pt is nonverbal and gets agitated with physical touch.  Pt hitting himself in the head with his R hand during session.  Mitt placed on hand by nsg at end of session. Pt yelling out throughout session with mobility.  Pt does not appear to be in a lot of pain.  Outbursts seem to be related more to external touch than pain.   Pain Assessment   Patient Currently in Pain Yes, see Vital Sign flowsheet  (Pt yelling with R LE movement )   Integumentary/Edema   Integumentary/Edema Comments R hip incision   Posture    Posture Forward head position;Protracted shoulders   Posture Comments L wrist in held in full flexion   Range of Motion (ROM)   ROM Comment B hip flexion to atleast 90 deg while sitting on EOB   Strength   Strength Comments Pt able to move LEs in bed, grossly 2/5 strength   Bed Mobility   Bed Mobility supine-sit   Supine-Sit Webb (Bed Mobility) maximum assist (25% patient effort);2 person assist   Comment (Bed Mobility) Max A x 2 with HOB elevated to scoot hips to L side of bed.  Assist at trunk and LEs to swing LEs over EOB and come to full sitting.     Transfers   Transfers bed-chair transfer   Transfer Safety Concerns Noted decreased weight-shifting ability   Bed-Chair Transfer   Bed-Chair Webb (Transfers) maximum assist (25% patient effort);2 person assist   Bed/Chair Transfer Comments Sit <> stand with Max A x 2 with gait belt and assist under UEs.  FWW in front of pt but pt unable to maintain grasp on FWW.  Pt able  to take 3-4 steps with Mod/Max A on each side to pivot to chair.     Gait/Stairs (Locomotion)   [place] Level (Gait) unable to assess   Balance   Balance Comments Pt able to sit on EOB with close SBA and maintain midline.  Poor standing balance requiring A x 2 to pivot to chair.   Sensory Examination   Sensory Perception Comments unable to assess   Coordination   Coordination other (see comments)   Coordination Comments decreased B UE and LE coordination noted with mobility   Muscle Tone   Muscle Tone Comments fully body tremors noted when lying in bed   Clinical Impression   Criteria for Skilled Therapeutic Intervention yes, treatment indicated   PT Diagnosis (PT) Decreased functional mobility   Influenced by the following impairments R hip pain, decreased LE strength and ROM, impaired gait and balance, decreased activity tolerance, impaired cognition   Functional limitations due to impairments assisted mobility, unable to ambulate functional household distances, increased falls risk, dependent with all ADLs   Clinical Presentation Stable/Uncomplicated   Clinical Presentation Rationale pt medically stable   Clinical Decision Making (Complexity) low complexity   Therapy Frequency (PT) Daily   Predicted Duration of Therapy Intervention (days/wks) 3 days   Planned Therapy Interventions (PT) bed mobility training;gait training;ROM (range of motion);strengthening;stretching;transfer training   Risk & Benefits of therapy have been explained evaluation/treatment results reviewed;care plan/treatment goals reviewed;risks/benefits reviewed;current/potential barriers reviewed;participants voiced agreement with care plan;participants included;patient   PT Discharge Planning    PT Discharge Recommendation (DC Rec) home with assist;home with home care physical therapy   PT Rationale for DC Rec Pt below baseline with all mobility.  Recommend patient return to group home with A x 2 for mobility and Home PT.  Group home  confirmed they are able to provide A x 2.  Home PT as leaving the home would cause considerable pain and hardship to the patient.   PT Brief overview of current status  A x 2 stand pivot transfer bed <> chair without a walker   Total Evaluation Time   Total Evaluation Time (Minutes) 10

## 2021-05-31 NOTE — PROGRESS NOTES
VSS. On RA when awake, requiring 2L O2 via oxymask while asleep (snoring, mouth breathing). Patient got up to chair with A2 gait belt. Patient was very agitated, yelling/crying, hitting himself in the head. Bilateral hand mitts applied for safety. Patient able to take his pills whole in chocolate pudding, gave scheduled tylenol and 5mg oxycodone for signs of pain and  zyprexa for agitation. Patient only took a few bites of pudding, pushed all other food away. Will continue to attempt to give scheduled po medications as patient allows. Dressing CDI. CMS intact as far as I can tell - normal pulse and capillary refill. Voiding via external catheter. Plan for possible discharge back to group home tomorrow, group home staff verified they could take patient back as an A2. Will continue to monitor.    Group home staff member states they feed the patient a pureed diet with honey thickened liquids, not dd3. Order changed to dd1.

## 2021-06-01 ENCOUNTER — APPOINTMENT (OUTPATIENT)
Dept: PHYSICAL THERAPY | Facility: CLINIC | Age: 66
DRG: 480 | End: 2021-06-01
Payer: MEDICARE

## 2021-06-01 ENCOUNTER — TELEPHONE (OUTPATIENT)
Dept: NEUROSURGERY | Facility: CLINIC | Age: 66
End: 2021-06-01

## 2021-06-01 VITALS
RESPIRATION RATE: 18 BRPM | SYSTOLIC BLOOD PRESSURE: 134 MMHG | TEMPERATURE: 97.9 F | HEIGHT: 61 IN | BODY MASS INDEX: 20.47 KG/M2 | HEART RATE: 101 BPM | DIASTOLIC BLOOD PRESSURE: 45 MMHG | OXYGEN SATURATION: 94 % | WEIGHT: 108.4 LBS

## 2021-06-01 DIAGNOSIS — Z87.81 H/O CERVICAL FRACTURE: Primary | ICD-10-CM

## 2021-06-01 LAB
GLUCOSE BLDC GLUCOMTR-MCNC: 89 MG/DL (ref 70–99)
GLUCOSE SERPL-MCNC: 87 MG/DL (ref 70–99)
HGB BLD-MCNC: 7.9 G/DL (ref 13.3–17.7)
INTERPRETATION ECG - MUSE: NORMAL

## 2021-06-01 PROCEDURE — 250N000011 HC RX IP 250 OP 636: Performed by: ORTHOPAEDIC SURGERY

## 2021-06-01 PROCEDURE — 250N000013 HC RX MED GY IP 250 OP 250 PS 637: Performed by: HOSPITALIST

## 2021-06-01 PROCEDURE — 999N001017 HC STATISTIC GLUCOSE BY METER IP

## 2021-06-01 PROCEDURE — 250N000011 HC RX IP 250 OP 636: Performed by: PHYSICIAN ASSISTANT

## 2021-06-01 PROCEDURE — 82947 ASSAY GLUCOSE BLOOD QUANT: CPT | Performed by: INTERNAL MEDICINE

## 2021-06-01 PROCEDURE — 97530 THERAPEUTIC ACTIVITIES: CPT | Mod: GP | Performed by: PHYSICAL THERAPIST

## 2021-06-01 PROCEDURE — 250N000013 HC RX MED GY IP 250 OP 250 PS 637: Performed by: ORTHOPAEDIC SURGERY

## 2021-06-01 PROCEDURE — 99239 HOSP IP/OBS DSCHRG MGMT >30: CPT | Performed by: HOSPITALIST

## 2021-06-01 PROCEDURE — 85018 HEMOGLOBIN: CPT | Performed by: INTERNAL MEDICINE

## 2021-06-01 RX ORDER — CELECOXIB 100 MG/1
100 CAPSULE ORAL 2 TIMES DAILY
Qty: 30 CAPSULE | Refills: 0 | Status: SHIPPED | OUTPATIENT
Start: 2021-06-01 | End: 2021-06-16

## 2021-06-01 RX ORDER — OXYCODONE HYDROCHLORIDE 5 MG/1
5 TABLET ORAL EVERY 6 HOURS PRN
Qty: 12 TABLET | Refills: 0 | Status: SHIPPED | OUTPATIENT
Start: 2021-06-01 | End: 2021-06-11

## 2021-06-01 RX ORDER — SENNOSIDES A AND B 8.6 MG/1
1 TABLET, FILM COATED ORAL DAILY
COMMUNITY
Start: 2021-06-11

## 2021-06-01 RX ORDER — ASPIRIN 325 MG
325 TABLET ORAL 2 TIMES DAILY
Qty: 60 TABLET | Refills: 0 | Status: SHIPPED | OUTPATIENT
Start: 2021-06-01 | End: 2021-07-01

## 2021-06-01 RX ORDER — AMOXICILLIN 250 MG
1 CAPSULE ORAL 2 TIMES DAILY
Qty: 20 TABLET | Refills: 0 | Status: SHIPPED | OUTPATIENT
Start: 2021-06-01 | End: 2021-06-11

## 2021-06-01 RX ORDER — ACETAMINOPHEN 325 MG/1
975 TABLET ORAL EVERY 8 HOURS
Qty: 90 TABLET | Refills: 0 | Status: SHIPPED | OUTPATIENT
Start: 2021-06-01 | End: 2021-06-11

## 2021-06-01 RX ORDER — OXYCODONE HYDROCHLORIDE 5 MG/1
5 TABLET ORAL EVERY 6 HOURS PRN
Qty: 12 TABLET | Refills: 0 | Status: SHIPPED | OUTPATIENT
Start: 2021-06-01 | End: 2021-06-01

## 2021-06-01 RX ORDER — DIAZEPAM 5 MG
7.5 TABLET ORAL DAILY PRN
COMMUNITY
Start: 2021-06-01

## 2021-06-01 RX ORDER — ASPIRIN 325 MG
325 TABLET ORAL 2 TIMES DAILY
Qty: 60 TABLET | Refills: 0 | Status: SHIPPED | OUTPATIENT
Start: 2021-06-01 | End: 2021-06-01

## 2021-06-01 RX ADMIN — CELECOXIB 100 MG: 100 CAPSULE ORAL at 08:45

## 2021-06-01 RX ADMIN — DOCUSATE SODIUM 50 MG AND SENNOSIDES 8.6 MG 1 TABLET: 8.6; 5 TABLET, FILM COATED ORAL at 08:46

## 2021-06-01 RX ADMIN — ACETAMINOPHEN 975 MG: 325 TABLET, FILM COATED ORAL at 08:46

## 2021-06-01 RX ADMIN — AMOXICILLIN AND CLAVULANATE POTASSIUM 1 TABLET: 875; 125 TABLET, FILM COATED ORAL at 08:42

## 2021-06-01 RX ADMIN — HYDROMORPHONE HYDROCHLORIDE 0.2 MG: 0.2 INJECTION, SOLUTION INTRAMUSCULAR; INTRAVENOUS; SUBCUTANEOUS at 05:21

## 2021-06-01 RX ADMIN — ESCITALOPRAM 15 MG: 5 TABLET, FILM COATED ORAL at 08:44

## 2021-06-01 RX ADMIN — LORAZEPAM 1 MG: 2 INJECTION INTRAMUSCULAR; INTRAVENOUS at 09:05

## 2021-06-01 RX ADMIN — DOCUSATE SODIUM 100 MG: 100 CAPSULE, LIQUID FILLED ORAL at 08:46

## 2021-06-01 RX ADMIN — DIVALPROEX SODIUM 250 MG: 250 TABLET, DELAYED RELEASE ORAL at 08:44

## 2021-06-01 RX ADMIN — FOLIC ACID 1 MG: 1 TABLET ORAL at 08:46

## 2021-06-01 RX ADMIN — ENOXAPARIN SODIUM 30 MG: 30 INJECTION SUBCUTANEOUS at 08:47

## 2021-06-01 ASSESSMENT — ACTIVITIES OF DAILY LIVING (ADL)
ADLS_ACUITY_SCORE: 35
ADLS_ACUITY_SCORE: 35
ADLS_ACUITY_SCORE: 39
ADLS_ACUITY_SCORE: 35
ADLS_ACUITY_SCORE: 39

## 2021-06-01 NOTE — PHARMACY
Discharge medication review for this patient is complete.   Patient was not counseled or given any education materials as discharged to group home, LTC, TCU facility, Memory Care Facility, etc.  See EPIC for allergy information, prior to admission medications and immunization status.   Pharmacist assisted with medication reconciliation of discharge medications with PTA medications.    MD was contacted with any questions/concerns: paged as FYI; added directions to AVS to hold scheduled PTA senna doses while taking newly prescribed scheduled senna-docusate at discharge to avoid duplicate therapy.     Additional medication history information: None    Discharge Medication List     Review of your medicines      START taking      Dose / Directions   celecoxib 100 MG capsule  Commonly known as: celeBREX      Dose: 100 mg  Take 1 capsule (100 mg) by mouth 2 times daily  Quantity: 30 capsule  Refills: 0     enoxaparin ANTICOAGULANT 30 MG/0.3ML syringe  Commonly known as: LOVENOX      Dose: 30 mg  Inject 0.3 mLs (30 mg) Subcutaneous every 12 hours for 28 doses  Refills: 0     oxyCODONE 5 MG tablet  Commonly known as: ROXICODONE      Dose: 5 mg  Take 1 tablet (5 mg) by mouth every 4 hours as needed for breakthrough pain  Quantity: 20 tablet  Refills: 0     senna-docusate 8.6-50 MG tablet  Commonly known as: SENOKOT-S/PERICOLACE      Dose: 1 tablet  Take 1 tablet by mouth 2 times daily  Quantity: 60 tablet  Refills: 0        CONTINUE these medicines which may have CHANGED, or have new prescriptions. If we are uncertain of the size of tablets/capsules you have at home, strength may be listed as something that might have changed.      Dose / Directions   acetaminophen 325 MG tablet  Commonly known as: TYLENOL  This may have changed:     how much to take    when to take this    reasons to take this    Another medication with the same name was removed. Continue taking this medication, and follow the directions you see here.       Dose: 975 mg  Take 3 tablets (975 mg) by mouth every 8 hours  Quantity: 60 tablet  Refills: 0        CONTINUE these medicines which have NOT CHANGED      Dose / Directions   calcium carbonate-vitamin D 500-400 MG-UNIT tablet  Commonly known as: OS-CHAUNCEY      Dose: 1 tablet  Take 1 tablet by mouth 2 times daily  Refills: 0     carbamide peroxide 6.5 % otic solution  Commonly known as: DEBROX      Dose: 4 drop  Place 4 drops into both ears Daily for 5 days of each month. Begin on the first Adrian of each month.  Refills: 0     clotrimazole 1 % external cream  Commonly known as: LOTRIMIN      Apply topically 2 times daily as needed  Refills: 0     divalproex sodium delayed-release 250 MG DR tablet  Commonly known as: DEPAKOTE  Used for: Nonintractable epilepsy without status epilepticus, unspecified epilepsy type (H)      Dose: 250 mg  Take 1 tablet (250 mg) by mouth 2 times daily  Quantity: 60 tablet  Refills: 0     ferrous sulfate 325 (65 Fe) MG tablet  Commonly known as: FEROSUL      Dose: 325 mg  Take 325 mg by mouth three times a week (Monday, Wednesday, Friday)  Refills: 0     folic acid 1 MG tablet  Commonly known as: FOLVITE      Dose: 1 mg  Take 1 mg by mouth daily  Refills: 0     levOCARNitine 1 GM/10ML solution  Commonly known as: CARNITOR      Dose: 330 mg  Take 330 mg by mouth 3 times daily  Refills: 0     LEXAPRO PO      Dose: 15 mg  Take 15 mg by mouth daily  Refills: 0     loratadine 10 MG tablet  Commonly known as: CLARITIN      Dose: 10 mg  Take 10 mg by mouth daily as needed for allergies  Refills: 0     mirtazapine 15 MG tablet  Commonly known as: REMERON      Dose: 15 mg  Take 15 mg by mouth At Bedtime  Refills: 0     Multi-vitamin tablet      Dose: 1 tablet  Take 1 tablet by mouth daily  Refills: 0     pantoprazole 40 MG EC tablet  Commonly known as: PROTONIX      Dose: 40 mg  Take 40 mg by mouth 2 times daily  Refills: 0     psyllium 58.6 % powder  Commonly known as: METAMUCIL/KONSYL      Dose: 1  Tablespoonful  Take 1 Tablespoonful by mouth every morning  Refills: 0     senna 8.6 MG tablet  Commonly known as: SENOKOT  Notes to patient: Hold doses while taking scheduled senna-docusate.       Dose: 1 tablet  Take 1 tablet by mouth daily  Refills: 0     triamcinolone 0.1 % external cream  Commonly known as: KENALOG      Apply topically 2 times daily as needed for irritation  Refills: 0     ZyPREXA 15 MG tablet  Generic drug: OLANZapine      Dose: 15 mg  Take 15 mg by mouth At Bedtime  Refills: 0        STOP taking    amoxicillin-clavulanate 875-125 MG tablet  Commonly known as: AUGMENTIN        diazepam 5 MG tablet  Commonly known as: VALIUM        ibuprofen 200 MG tablet  Commonly known as: ADVIL/MOTRIN              Where to get your medicines      These medications were sent to [facility] [id number] [facility] [address]    Phone: [phone]     acetaminophen 325 MG tablet    celecoxib 100 MG capsule    senna-docusate 8.6-50 MG tablet     Some of these will need a paper prescription and others can be bought over the counter. Ask your nurse if you have questions.    Bring a paper prescription for each of these medications    oxyCODONE 5 MG tablet       Sherry Shepard  PGY-1 Pharmacy Practice Resident

## 2021-06-01 NOTE — TELEPHONE ENCOUNTER
Spoke to facility ([facility]) about follow-up appointment w xrays prior. She states that acquiring imaging is very traumatic/stressful for the patient and wants to know the goals of the xray. RN will reach out to [facility] and have them advise what is necessary for wellbeing of patient, CT states fracture was chronic. May need to reach out to guardian to determine if they would like to go through with xray pending [facility] recommendations.

## 2021-06-01 NOTE — PLAN OF CARE
Pt nonverbal, VSS, tachy at times. Slept comfortably most of the night, pt gets combative and yells out with cares. IV Ativan and IV Dilaudid given x1. New dressing applied. External catheter in place. Poor appetite, only taking a few bites of pudding with meds. Repo in bed with assist of 2. Used sling when getting from chair to bed during the evening. Talked to group home staff as they were concerned about his weight. Plan to discharge back to group home today.

## 2021-06-01 NOTE — DISCHARGE SUMMARY
Hospitalist Discharge Summary  [facility]    Gigi Lackey MRN# [id number]   YOB: 1955 Age: 65 year old     Date of Admission:  5/29/2021  Date of Discharge:  6/1/2021  Admitting Physician:  Umair Caceres MD  Discharge Physician:  David Dumont DO  Discharging Service:  Hospitalist     Primary Provider: Jimbo Willis          Discharge Diagnosis:     Right intertrochanteric femur fracture status post internal fixation  Mechanical fall  Chronic dens fracture  Developmental delay  Phenylketonuria  Seizure disorder  Dysphagia  Recent pneumonia  Acute blood loss anemia             Discharge Disposition:     Discharged to home           Allergies:     Allergies   Allergen Reactions     Actifed [Allerfrim]      Antihistamines, Chlorpheniramine-Type [Alkylamines]      Doxycycline      Morphine      Pseudoephedrine      Sympathomimetics               Discharge Medications:     Current Discharge Medication List      START taking these medications    Details   celecoxib (CELEBREX) 100 MG capsule Take 1 capsule (100 mg) by mouth 2 times daily  Qty: 30 capsule, Refills: 0    Associated Diagnoses: Closed displaced intertrochanteric fracture of right femur, initial encounter (H)      enoxaparin ANTICOAGULANT (LOVENOX) 30 MG/0.3ML syringe Inject 0.3 mLs (30 mg) Subcutaneous every 12 hours for 28 doses  Refills: 0    Associated Diagnoses: Closed displaced intertrochanteric fracture of right femur, initial encounter (H)      oxyCODONE (ROXICODONE) 5 MG tablet Take 1 tablet (5 mg) by mouth every 4 hours as needed for breakthrough pain  Qty: 20 tablet, Refills: 0    Associated Diagnoses: Closed displaced intertrochanteric fracture of right femur, initial encounter (H)      senna-docusate (SENOKOT-S/PERICOLACE) 8.6-50 MG tablet Take 1 tablet by mouth 2 times daily  Qty: 60 tablet, Refills: 0    Associated Diagnoses: Closed displaced intertrochanteric fracture of right  femur, initial encounter (H)         CONTINUE these medications which have CHANGED    Details   acetaminophen (TYLENOL) 325 MG tablet Take 3 tablets (975 mg) by mouth every 8 hours  Qty: 60 tablet, Refills: 0    Associated Diagnoses: Closed displaced intertrochanteric fracture of right femur, initial encounter (H)         CONTINUE these medications which have NOT CHANGED    Details   calcium carbonate-vitamin D (OS-CHAUNCEY) 500-400 MG-UNIT tablet Take 1 tablet by mouth 2 times daily      carbamide peroxide (DEBROX) 6.5 % otic solution Place 4 drops into both ears Daily for 5 days of each month. Begin on the first Sunday of each month.      divalproex sodium delayed-release (DEPAKOTE) 250 MG DR tablet Take 1 tablet (250 mg) by mouth 2 times daily  Qty: 60 tablet, Refills: 0    Associated Diagnoses: Nonintractable epilepsy without status epilepticus, unspecified epilepsy type (H)      Escitalopram Oxalate (LEXAPRO PO) Take 15 mg by mouth daily       ferrous sulfate (FEROSUL) 325 (65 Fe) MG tablet Take 325 mg by mouth three times a week (Monday, Wednesday, Friday)      folic acid (FOLVITE) 1 MG tablet Take 1 mg by mouth daily      levOCARNitine (CARNITOR) 1 GM/10ML solution Take 330 mg by mouth 3 times daily      mirtazapine (REMERON) 15 MG tablet Take 15 mg by mouth At Bedtime      multivitamin w/minerals (MULTI-VITAMIN) tablet Take 1 tablet by mouth daily      OLANZapine (ZYPREXA) 15 MG tablet Take 15 mg by mouth At Bedtime       pantoprazole (PROTONIX) 40 MG EC tablet Take 40 mg by mouth 2 times daily      psyllium (METAMUCIL/KONSYL) 58.6 % powder Take 1 Tablespoonful by mouth every morning      senna (SENOKOT) 8.6 MG tablet Take 1 tablet by mouth daily      clotrimazole (LOTRIMIN) 1 % external cream Apply topically 2 times daily as needed      loratadine (CLARITIN) 10 MG tablet Take 10 mg by mouth daily as needed for allergies      triamcinolone (KENALOG) 0.1 % external cream Apply topically 2 times daily as needed for  "irritation         STOP taking these medications       acetaminophen (ACETAMINOPHEN 8 HOUR) 650 MG CR tablet Comments:   Reason for Stopping:         amoxicillin-clavulanate (AUGMENTIN) 875-125 MG tablet Comments:   Reason for Stopping:         diazepam (VALIUM) 5 MG tablet Comments:   Reason for Stopping:         ibuprofen (ADVIL/MOTRIN) 200 MG tablet Comments:   Reason for Stopping:                      Condition on Discharge:     Discharge condition: Stable   Discharge vitals: Blood pressure 134/45, pulse 101, temperature 97.9  F (36.6  C), temperature source Temporal, resp. rate 18, height 1.562 m (5' 1.5\"), weight 49.2 kg (108 lb 6.4 oz), SpO2 94 %.   Code status on discharge: DNR / DNI      BASIC PHYSICAL EXAMINATION:  GENERAL: No apparent distress.  CARDIOVASCULAR: Regular rate and rhythm without murmurs.  PULMONARY: Clear to auscultation bilaterally.   GASTROINTESTINAL: Abdomen soft, non-tender.  EXTREMITIES: No edema, pulses intact.  NEUROLOGIC: No focal deficits.            History of Illness:   See detailed admission note for full details.               Procedures excluding imaging which is summarized below:     Please see details in the electronic medical record.           Consultations:     ORTHOPEDIC SURGERY IP CONSULT  CARE MANAGEMENT / SOCIAL WORK IP CONSULT  SOCIAL WORK IP CONSULT  VASCULAR ACCESS ADULT IP CONSULT  PHYSICAL THERAPY ADULT IP CONSULT  OCCUPATIONAL THERAPY ADULT IP CONSULT  VASCULAR ACCESS ADULT IP CONSULT  PHYSICAL THERAPY ADULT IP CONSULT  OCCUPATIONAL THERAPY ADULT IP CONSULT  CARE MANAGEMENT / SOCIAL WORK IP CONSULT          Significant Results:     Results for orders placed or performed during the hospital encounter of 05/29/21   XR Tibia & Fibula Right 2 Views    Addendum: 5/29/2021    ADDENDUM dictated by Dr. Bassam Beard, 05/29/2021.  Original report dictated by Dr. Bassam Beard, 05/29/2021    The following report supersedes the previously distributed report. Changes " have been made to the original report as indicated by the double parentheses ((   )).  Please disregard previous report.    EXAM: XR TIBIA and FIBULA RT 2 VW  LOCATION: [facility]  DATE/TIME: 5/29/2021 7:37 PM    INDICATION: Leg pain after a fall.  COMPARISON: ((Foot radiographs, same date.))    IMPRESSION: Generalized demineralization. No fracture identified in the tibia or fibula. Normal knee and ankle joint alignment. Generalized atrophy/volume loss of the musculature in the calf.        Narrative    EXAM: XR TIBIA and FIBULA RT 2 VW  LOCATION: [facility]  DATE/TIME: 5/29/2021 7:37 PM    INDICATION: Leg pain after a fall.  COMPARISON: Ankle and foot radiographs, same date.      Impression    IMPRESSION: Generalized demineralization. No fracture identified in the tibia or fibula. Normal knee and ankle joint alignment. Generalized atrophy/volume loss of the musculature in the calf.   XR Pelvis w Hip Right 1 View    Narrative    EXAM: XR PELVIS AND HIP RIGHT 1 VIEW  LOCATION: [facility]  DATE/TIME: 5/29/2021 7:37 PM    INDICATION: Right leg pain after a fall  COMPARISON: None.      Impression    IMPRESSION:     Acute right femoral fracture, involving the basicervical femoral neck, likely extending into the greater trochanter. Possible but not definite extension into the lesser trochanter. Moderate fracture angulation, no significant fracture displacement. The   femoral head articular surface remains intact.    The right hip is developmentally dysplastic, but joint spacing is maintained without significant degenerative arthritic changes. No hip subluxation or dislocation.    Left hip is similarly developmentally dysplastic without significant degenerative changes. No left hip joint malalignment.    Bones are demineralized and gracile, consistent with chronic disuse.    Soft tissue swelling present about the proximal right femur.   Cervical spine CT w/o contrast    Narrative     EXAM: CT HEAD W/O CONTRAST, CT CERVICAL SPINE W/O CONTRAST  LOCATION: [facility]  DATE/TIME: 5/29/2021 6:35 PM    INDICATION: Head and neck injury  COMPARISON: CT head 09/17/2019, CT cervical spine 06/05/2016  TECHNIQUE:   1) Routine CT Head without IV contrast. Multiplanar reformats. Dose reduction techniques were used.  2) Routine CT Cervical Spine without IV contrast. Multiplanar reformats. Dose reduction techniques were used.    FINDINGS:   HEAD CT:   INTRACRANIAL CONTENTS: No intracranial hemorrhage, extraaxial collection, or mass effect.  No CT evidence of acute infarct. Moderate presumed chronic small vessel ischemic changes. Moderate generalized volume loss. No hydrocephalus.     VISUALIZED ORBITS/SINUSES/MASTOIDS: Partially calcified shrunken and left optic globe, similar to prior. No paranasal sinus mucosal disease. No middle ear or mastoid effusion.    BONES/SOFT TISSUES: No acute abnormality.    CERVICAL SPINE CT:   VERTEBRA: Study significantly affected by motion artifact. Slight anterior subluxation C4 on C5 and C5 on C6. Fracture at the base of the dens; new since prior, however cortication of fracture fragments suggests a chronic fracture. No definite other   fractures. Mild left cervical curve.    CANAL/FORAMINA: Multilevel degenerative changes without significant canal narrowing at any level. Neural foraminal narrowing: Mild bilateral at C2-C3, moderate right and mild left at C3-C4, moderate right at C5-C6, moderate left at C6-C7.    PARASPINAL: No extraspinal abnormality. Visualized lung fields are clear.      Impression    IMPRESSION:  HEAD CT:  1.  No acute intracranial process.    CERVICAL SPINE CT:  1.  Fracture at the base of the dens with cortication of fracture fragments suggesting chronicity.  2.  No definite acute fracture.  3.  Degenerative changes, as described.   Head CT w/o contrast    Narrative    EXAM: CT HEAD W/O CONTRAST, CT CERVICAL SPINE W/O CONTRAST  LOCATION:  [facility]  DATE/TIME: 5/29/2021 6:35 PM    INDICATION: Head and neck injury  COMPARISON: CT head 09/17/2019, CT cervical spine 06/05/2016  TECHNIQUE:   1) Routine CT Head without IV contrast. Multiplanar reformats. Dose reduction techniques were used.  2) Routine CT Cervical Spine without IV contrast. Multiplanar reformats. Dose reduction techniques were used.    FINDINGS:   HEAD CT:   INTRACRANIAL CONTENTS: No intracranial hemorrhage, extraaxial collection, or mass effect.  No CT evidence of acute infarct. Moderate presumed chronic small vessel ischemic changes. Moderate generalized volume loss. No hydrocephalus.     VISUALIZED ORBITS/SINUSES/MASTOIDS: Partially calcified shrunken and left optic globe, similar to prior. No paranasal sinus mucosal disease. No middle ear or mastoid effusion.    BONES/SOFT TISSUES: No acute abnormality.    CERVICAL SPINE CT:   VERTEBRA: Study significantly affected by motion artifact. Slight anterior subluxation C4 on C5 and C5 on C6. Fracture at the base of the dens; new since prior, however cortication of fracture fragments suggests a chronic fracture. No definite other   fractures. Mild left cervical curve.    CANAL/FORAMINA: Multilevel degenerative changes without significant canal narrowing at any level. Neural foraminal narrowing: Mild bilateral at C2-C3, moderate right and mild left at C3-C4, moderate right at C5-C6, moderate left at C6-C7.    PARASPINAL: No extraspinal abnormality. Visualized lung fields are clear.      Impression    IMPRESSION:  HEAD CT:  1.  No acute intracranial process.    CERVICAL SPINE CT:  1.  Fracture at the base of the dens with cortication of fracture fragments suggesting chronicity.  2.  No definite acute fracture.  3.  Degenerative changes, as described.   XR Foot Right 2 Views    Narrative    EXAM: XR FOOT RIGHT 2 VIEWS  LOCATION: [facility]  DATE/TIME: 5/29/2021 7:37 PM    INDICATION: Right ankle pain after a  fall.  COMPARISON: Tibia/fibular radiographs, same date.      Impression    IMPRESSION:     Generalized demineralization. No definite acute fracture. Oblique lucency overlying the lateral base of the distal phalanx in the right great toe is suspected to represent a summation shadow. Normal joint alignment with no acute subluxation. Moderate   degenerative arthritic changes in the ankle/tibiotalar joint. Fusion of the subtalar joint, either postsurgical or developmental, or due to relative disuse. Arthrodesis of the first TMT joint and between the first and second metatarsal bases with screws.   No hardware loosening or complication.   XR Chest Port 1 View    Narrative    CHEST PORTABLE ONE VIEW   5/30/2021 8:52 AM     HISTORY: Recent history of antibiotics for suspected pneumonia.    COMPARISON: Chest x-ray 9/17/2019.      Impression    IMPRESSION: Portable chest. Lungs are hyperinflated with retrocardiac  opacity likely left lower lung consolidation or atelectasis. This has  increased since prior exam. Findings likely reflect underlying COPD.  Heart is normal in size. No pneumothorax. No definite pleural  effusions.    GEORGINA HI MD   XR Surgery FABRICE L/T 5 Min Fluoro w Stills    Narrative    This exam was marked as non-reportable because it will not be read by a   radiologist or a [facility] non-radiologist provider.               Transthoracic Echocardiogram Results:  No results found for this or any previous visit (from the past 4320 hour(s)).             Pending Results:     Unresulted Labs Ordered in the Past 30 Days of this Admission     No orders found from 4/29/2021 to 5/30/2021.                      Discharge Instructions and Follow-Up:     Discharge instructions and follow-up:   Discharge Procedure Orders   Wound care   Order Comments: Site: right  Hip  Instructions:  Daily dry dressing changes. Keep dressing clean and dry.  If Aquacel dressing in place leave until follow up with surgeon and okay to shower  with dressing in place.     Weight bearing status   Order Comments: WBAT with assistive device     Follow Up and recommended labs and tests   Order Comments: Follow up in clinic with Brenda Mares of 's office in 2 weeks.  Call clinic to schedule [phone]     Reason for your hospital stay   Order Comments: Following right intertrochanteric femur fracture, s/p ORIF     Activity   Order Comments: Your activity upon discharge: weight bear as tolerated, using gait aid as needed.     Order Specific Question Answer Comments   Is discharge order? Yes      Physical Therapy Adult Consult   Order Comments: Evaluate and treat as clinically indicated.    Reason:  S/p ORIF right hip     Occupational Therapy Adult Consult   Order Comments: Evaluate and treat as clinically indicated.    Reason:  S/p ORIF right hip     Fall precautions     Crutches DME   Order Comments: DME Documentation: Describe the reason for need to support medical necessity: Impaired gait status post hip surgery. I, the undersigned, certify that the above prescribed supplies are medically necessary for this patient and is both reasonable and necessary in reference to accepted standards of medical practice in the treatment of this patient's condition and is not prescribed as a convenience.     Order Specific Question Answer Comments   DME Provider: [facility]    Crutch Type: Standard    Crutches Add On: NA    Length of Need: Lifetime      Cane DME   Order Comments: DME Documentation: Describe the reason for need to support medical necessity: Impaired gait status post hip surgery. I, the undersigned, certify that the above prescribed supplies are medically necessary for this patient and is both reasonable and necessary in reference to accepted standards of medical practice in the treatment of this patient's condition and is not prescribed as a convenience.     Order Specific Question Answer Comments   DME Provider: [facility]    Cane Type:  Single Tip    Reminder: Patient can typically get 1 every 5 years      Walker DME   Order Comments: : DME Documentation: Describe the reason for need to support medical necessity: Impaired gait status post hip surgery. I, the undersigned, certify that the above prescribed supplies are medically necessary for this patient and is both reasonable and necessary in reference to accepted standards of medical practice in the treatment of this patient's condition and is not prescribed as a convenience.     Order Specific Question Answer Comments   DME Provider: [place]    Walker Type: Standard (2 Wheel)    Accessories: N/A      Diet   Order Comments: Follow this diet upon discharge: Orders Placed This Encounter      Dysphagia Diet Level 1 Pureed Honey Thickened Liquids (pre-thickened or use instant food thickener)     Order Specific Question Answer Comments   Is discharge order? Yes              Hospital Course:     Gigi Lackey is a 65 year old male with past medical history of phenylketonuria, development disorder and cognitive impairment, epilepsy, GERD, anemia, who is nonverbal at baseline and a state aponte with guardianship who presented from group home after a witnessed fall with right lower extremity pain and inability to bear weight.      In the ED afebrile his vitals showed /94, pulse 90, RR 20.  Imaging ncluded negative NCHCT.  CT cervical spine showed fracture at the base of the dens with cortication of fracture fragments suggestive of chronic appearance.  CT C-spine was reviewed with neurosurgery service who agreed non to be an acute finding and did not recommend C- Collar or other management.      Given guarding of right lower extremity imaging of the pelvis, right hip, tibia and fibula as well as foot was pursued in the emergency department.  Pelvis x-ray showed acute fracture of right femoral neck extending likely into the greater trochanter, with possible extension into the lesser  trochanter, moderate fracture angulation, developmentally dysplastic hips bilaterally with demineralized bones and soft tissue swelling that the right proximal femur.     Patient's case and presentation was discussed with on-call orthopedic surgery.  Admission was requested to hospitalist service for further cares and monitoring with orthopedic surgery consult for definitive treatment.       He underwent uncomplicated internal fixation on 5/30.  Pain appears well controlled.  Unable to participate much in physical therapy due to developmental delay and cognitive impairment.  Planning to discharge back to group home today.  He did have some mild acute blood loss anemia with hemoglobin plateau at 7.9.  He will be maintained on enoxaparin for DVT prophylaxis.  Given his baseline cognitive and developmental conditions a TCU was suboptimal and he has limited ability to participate in physical therapy and would be best served by discharging back to his group home.    The patient was seen, examined, and counseled on this day. All questions were addressed and the patient agreed to follow-up as noted above.      Total time spent in face to face contact with the patient and coordinating discharge was:  35 Minutes    David Dumont DO, MPH  [facility] Hospitalist  [address] [place] [address]  06/01/2021

## 2021-06-01 NOTE — TELEPHONE ENCOUNTER
----- Message from Sharon Morales PA-C sent at 5/29/2021  8:03 PM CDT -----  Schedule 4 week follow up with cervical XR   Reviewed with Dr. Shannon Herrera

## 2021-06-01 NOTE — PLAN OF CARE
Patient nonverbal. PCA at bedside. Anxious and agitated with cares. Gave Ativan x1. VSS on room air. Did tolerate some PO medication and had a fair appetite. On aspiration precautions, tolerated DD1 pureed honey thick diet. Ambulated Ax2 to recliner. Incontinent at baseline, external male catheter in place. Tylenol for pain. New CallMD drsg's applied to R hip. Midline removed and tegaderm to R arm CDI.     Picked up by 2 staff from [facility] at 1645. All belongings returned to patient. Filled prescriptions for aspirin, tylenol, senexon, celecoxib, and oxycodone given to staff. All discharge instructions given to staff. Cleared by MARV Quiñones to return to [facility].

## 2021-06-01 NOTE — PLAN OF CARE
Physical Therapy Discharge Summary    Reason for therapy discharge:    Discharged to home with home therapy.    Progress towards therapy goal(s). See goals on Care Plan in [facility] electronic health record for goal details.  Goals not met.  Barriers to achieving goals:   discharge from facility.    Therapy recommendation(s):    Continued therapy is recommended.  Rationale/Recommendations:  Continued skilled PT to progress independence with all mobility to enable pt to return to PLOF and decrease falls risk and burden of care..

## 2021-06-01 NOTE — PROGRESS NOTES
"Orthopedic Surgery  6/1/2021    S: Patient voices no complaints today.     O: Blood pressure 116/44, pulse 80, temperature 97  F (36.1  C), temperature source Temporal, resp. rate 18, height 1.562 m (5' 1.5\"), weight 49.2 kg (108 lb 6.4 oz), SpO2 94 %.  Lab Results   Component Value Date    HGB 7.9 06/01/2021     Lab Results   Component Value Date    INR 1.23 05/31/2021        Neurovascularly intact.  Calves are negative bilaterally, both soft and nontender.  The wound is C/D/I.  The wound looks good with minimal erythema of the surrounding skin.    A: Mr. Lackey is doing well status post Procedure(s):  INTERNAL FIXATION, FRACTURE, TROCHANTERIC, RIGHT HIP.    P: Continue physical therapy.   Anticoagulation with lovenox, plan for lovenox for 2 weeks followed by [facility]  Pain management  Discharge planning, plan for discharge to TCU when able    Umair Caceres MD  [phone]    "

## 2021-06-01 NOTE — PROGRESS NOTES
[facility] RN Trauma Coordinator:    Writer noted SW team currently working with GH and guardian on discharge plan.     Plan - will continue to follow along with SW plan.     Betina Nguyen RN  [facility] Trauma Coordinator    [facility]   [place]   [address] [place] [address]  T: [phone]  C: [phone]  F: [phone]  E: [email or web address].

## 2021-07-06 ENCOUNTER — ANCILLARY PROCEDURE (OUTPATIENT)
Dept: GENERAL RADIOLOGY | Facility: CLINIC | Age: 66
End: 2021-07-06
Payer: MEDICARE

## 2021-07-06 ENCOUNTER — OFFICE VISIT (OUTPATIENT)
Dept: NEUROSURGERY | Facility: CLINIC | Age: 66
End: 2021-07-06
Attending: PHYSICIAN ASSISTANT
Payer: MEDICARE

## 2021-07-06 VITALS
HEART RATE: 86 BPM | HEIGHT: 62 IN | SYSTOLIC BLOOD PRESSURE: 146 MMHG | OXYGEN SATURATION: 98 % | RESPIRATION RATE: 20 BRPM | BODY MASS INDEX: 19.88 KG/M2 | WEIGHT: 108 LBS | DIASTOLIC BLOOD PRESSURE: 68 MMHG

## 2021-07-06 DIAGNOSIS — S12.100A CLOSED ODONTOID FRACTURE, INITIAL ENCOUNTER (H): Primary | ICD-10-CM

## 2021-07-06 DIAGNOSIS — Z87.81 H/O CERVICAL FRACTURE: ICD-10-CM

## 2021-07-06 PROCEDURE — G0463 HOSPITAL OUTPT CLINIC VISIT: HCPCS

## 2021-07-06 PROCEDURE — 72040 X-RAY EXAM NECK SPINE 2-3 VW: CPT | Performed by: RADIOLOGY

## 2021-07-06 PROCEDURE — 99203 OFFICE O/P NEW LOW 30 MIN: CPT | Performed by: PHYSICIAN ASSISTANT

## 2021-07-06 RX ORDER — ACETAMINOPHEN 325 MG/1
650 TABLET ORAL 2 TIMES DAILY
COMMUNITY

## 2021-07-06 ASSESSMENT — MIFFLIN-ST. JEOR: SCORE: 1149.13

## 2021-07-06 NOTE — LETTER
7/6/2021         RE: Gigi Lackey  [address]  [facility] [address]        Dear Colleague,    Thank you for referring your patient, Gigi Lackey, to the [facility] [place]. Please see a copy of my visit note below.    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   Gigi initially presented to the ED on 05/29/21 after a fall and inability to bear weight on his right leg. C spine CT obtained due to SANDOR and findings as specified below. He was also found to have acute right intertrochanteric femur fracture s/p internal fixation. He was discharged home in stable condition and told to follow up in 6 weeks time with XR. No cervical spine collar upon discharge due to chronicity of fracture on CT. Since discharge, staff denies Gigi c/o neck pain, new weakness, bowel/bladder changes. He has been moving at his baseline and participating in therapy for his RLE and doing well.     CERVICAL SPINE CT:  1.  Fracture at the base of the dens with cortication of fracture fragments suggesting chronicity.  2.  No definite acute fracture.  3.  Degenerative changes, as described.      Current Outpatient Medications:      acetaminophen (TYLENOL) 325 MG tablet, Take 650 mg by mouth 2 times daily, Disp: , Rfl:      calcium carbonate-vitamin D (OS-CHAUNCEY) 500-400 MG-UNIT tablet, Take 1 tablet by mouth 2 times daily, Disp: , Rfl:      divalproex sodium delayed-release (DEPAKOTE) 250 MG DR tablet, Take 1 tablet (250 mg) by mouth 2 times daily, Disp: 60 tablet, Rfl: 0     Escitalopram Oxalate (LEXAPRO PO), Take 15 mg by mouth daily , Disp: , Rfl:      ferrous sulfate (FEROSUL) 325 (65 Fe) MG tablet, Take 325 mg by mouth three  times a week (Monday, Wednesday, Friday), Disp: , Rfl:      folic acid (FOLVITE) 1 MG tablet, Take 1 mg by mouth daily, Disp: , Rfl:      levOCARNitine (CARNITOR) 1 GM/10ML solution, Take 330 mg by mouth 3 times daily, Disp: , Rfl:      mirtazapine (REMERON) 15 MG tablet, Take 15 mg by mouth At Bedtime, Disp: , Rfl:      multivitamin w/minerals (MULTI-VITAMIN) tablet, Take 1 tablet by mouth daily, Disp: , Rfl:      OLANZapine (ZYPREXA) 15 MG tablet, Take 15 mg by mouth At Bedtime , Disp: , Rfl:      pantoprazole (PROTONIX) 40 MG EC tablet, Take 40 mg by mouth 2 times daily, Disp: , Rfl:      senna (SENOKOT) 8.6 MG tablet, Take 1 tablet by mouth daily, Disp:  , Rfl:      carbamide peroxide (DEBROX) 6.5 % otic solution, Place 4 drops into both ears Daily for 5 days of each month. Begin on the first Adrian of each month., Disp: , Rfl:      celecoxib (CELEBREX) 100 MG capsule, Take 1 capsule (100 mg) by mouth 2 times daily for 15 days, Disp: 30 capsule, Rfl: 0     clotrimazole (LOTRIMIN) 1 % external cream, Apply topically 2 times daily as needed, Disp: , Rfl:      diazepam (VALIUM) 5 MG tablet, Take 1.5 tablets (7.5 mg) by mouth daily as needed for anxiety (give for medical appt), Disp: , Rfl:      loratadine (CLARITIN) 10 MG tablet, Take 10 mg by mouth daily as needed for allergies, Disp: , Rfl:      psyllium (METAMUCIL/KONSYL) 58.6 % powder, Take 1 Tablespoonful by mouth every morning, Disp: , Rfl:      triamcinolone (KENALOG) 0.1 % external cream, Apply topically 2 times daily as needed for irritation, Disp: , Rfl:      Allergies   Allergen Reactions     Actifed [Allerfrim]      Antihistamines, Chlorpheniramine-Type [Alkylamines]      Doxycycline      Morphine      Pseudoephedrine      Sympathomimetics         Past Medical History:   Diagnosis Date     Anemia      Epilepsy (H)      Gastro-oesophageal reflux disease      Osteoporosis      Pervasive developmental disorder      Profound intellectual disability      "    ROS: 10 point review of symptoms are negative other than the symptoms noted above in the HPI.     Family History has been reviewed with the patient, there are no pertinent findings to presenting concern.     Past Surgical History:   Procedure Laterality Date     OPEN REDUCTION INTERNAL FIXATION HIP NAILING Right 5/30/2021    Procedure: Internal fixation of right intertrochanteric femur fracture;  Surgeon: Umair Caceres MD;  Location: [facility] OR     ORTHOPEDIC SURGERY      ankle        Social History     Tobacco Use     Smoking status: Never Smoker     Smokeless tobacco: Never Used   Substance Use Topics     Alcohol use: No     Drug use: No        OBJECTIVE:   BP (!) 146/68 (BP Location: Right leg)   Pulse 86   Resp 20   Ht 5' 2\" (1.575 m)   Wt 108 lb (49 kg)   SpO2 98%   BMI 19.75 kg/m     Body mass index is 19.75 kg/m .     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 On exam, he is noted to have symmetric strength, sensation and range of motion. He has been at his neurologic baseline per staff without noticeable signs of neck pain or new weakness.    Based on his physical exam, imaging review, we feel that it would be in his best interest to continue a conservative approach. Recommend follow up as needed at this time.     In the event that patient's symptoms worsen or change we would like to see him sooner. We also discussed signs of a worsening " problem that he should seek being evaluated.     Will plan to call and update staff regarding cervical XR should there be any acute findings. All in agreement with plans.      Respectfully,     Sharon Morales PA-C  [facility] Neurosurgery  [address] [address]    Tel [phone]  Pager [phone]          Again, thank you for allowing me to participate in the care of your patient.        Sincerely,        Sharon Morales PA-C

## 2021-07-06 NOTE — PROGRESS NOTES
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   Gigi initially presented to the ED on 05/29/21 after a fall and inability to bear weight on his right leg. C spine CT obtained due to SANDOR and findings as specified below. He was also found to have acute right intertrochanteric femur fracture s/p internal fixation. He was discharged home in stable condition and told to follow up in 6 weeks time with XR. No cervical spine collar upon discharge due to chronicity of fracture on CT. Since discharge, staff denies Gigi c/o neck pain, new weakness, bowel/bladder changes. He has been moving at his baseline and participating in therapy for his RLE and doing well.     CERVICAL SPINE CT:  1.  Fracture at the base of the dens with cortication of fracture fragments suggesting chronicity.  2.  No definite acute fracture.  3.  Degenerative changes, as described.      Current Outpatient Medications:      acetaminophen (TYLENOL) 325 MG tablet, Take 650 mg by mouth 2 times daily, Disp: , Rfl:      calcium carbonate-vitamin D (OS-CHAUNCEY) 500-400 MG-UNIT tablet, Take 1 tablet by mouth 2 times daily, Disp: , Rfl:      divalproex sodium delayed-release (DEPAKOTE) 250 MG DR tablet, Take 1 tablet (250 mg) by mouth 2 times daily, Disp: 60 tablet, Rfl: 0     Escitalopram Oxalate (LEXAPRO PO), Take 15 mg by mouth daily , Disp: , Rfl:      ferrous sulfate (FEROSUL) 325 (65 Fe) MG tablet, Take 325 mg by mouth three times a week (Monday, Wednesday, Friday), Disp: , Rfl:      folic acid (FOLVITE) 1 MG tablet, Take 1 mg by mouth daily, Disp: , Rfl:      levOCARNitine (CARNITOR) 1 GM/10ML solution, Take 330 mg by mouth 3 times daily, Disp: , Rfl:      mirtazapine (REMERON) 15 MG tablet, Take 15 mg by mouth At Bedtime,  Disp: , Rfl:      multivitamin w/minerals (MULTI-VITAMIN) tablet, Take 1 tablet by mouth daily, Disp: , Rfl:      OLANZapine (ZYPREXA) 15 MG tablet, Take 15 mg by mouth At Bedtime , Disp: , Rfl:      pantoprazole (PROTONIX) 40 MG EC tablet, Take 40 mg by mouth 2 times daily, Disp: , Rfl:      senna (SENOKOT) 8.6 MG tablet, Take 1 tablet by mouth daily, Disp:  , Rfl:      carbamide peroxide (DEBROX) 6.5 % otic solution, Place 4 drops into both ears Daily for 5 days of each month. Begin on the first Sunday of each month., Disp: , Rfl:      celecoxib (CELEBREX) 100 MG capsule, Take 1 capsule (100 mg) by mouth 2 times daily for 15 days, Disp: 30 capsule, Rfl: 0     clotrimazole (LOTRIMIN) 1 % external cream, Apply topically 2 times daily as needed, Disp: , Rfl:      diazepam (VALIUM) 5 MG tablet, Take 1.5 tablets (7.5 mg) by mouth daily as needed for anxiety (give for medical appt), Disp: , Rfl:      loratadine (CLARITIN) 10 MG tablet, Take 10 mg by mouth daily as needed for allergies, Disp: , Rfl:      psyllium (METAMUCIL/KONSYL) 58.6 % powder, Take 1 Tablespoonful by mouth every morning, Disp: , Rfl:      triamcinolone (KENALOG) 0.1 % external cream, Apply topically 2 times daily as needed for irritation, Disp: , Rfl:      Allergies   Allergen Reactions     Actifed [Allerfrim]      Antihistamines, Chlorpheniramine-Type [Alkylamines]      Doxycycline      Morphine      Pseudoephedrine      Sympathomimetics         Past Medical History:   Diagnosis Date     Anemia      Epilepsy (H)      Gastro-oesophageal reflux disease      Osteoporosis      Pervasive developmental disorder      Profound intellectual disability         ROS: 10 point review of symptoms are negative other than the symptoms noted above in the HPI.     Family History has been reviewed with the patient, there are no pertinent findings to presenting concern.     Past Surgical History:   Procedure Laterality Date     OPEN REDUCTION INTERNAL FIXATION HIP  "NAILING Right 5/30/2021    Procedure: Internal fixation of right intertrochanteric femur fracture;  Surgeon: Umair Caceres MD;  Location: [facility] OR     ORTHOPEDIC SURGERY      ankle        Social History     Tobacco Use     Smoking status: Never Smoker     Smokeless tobacco: Never Used   Substance Use Topics     Alcohol use: No     Drug use: No        OBJECTIVE:   BP (!) 146/68 (BP Location: Right leg)   Pulse 86   Resp 20   Ht 5' 2\" (1.575 m)   Wt 108 lb (49 kg)   SpO2 98%   BMI 19.75 kg/m     Body mass index is 19.75 kg/m .     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 On exam, he is noted to have symmetric strength, sensation and range of motion. He has been at his neurologic baseline per staff without noticeable signs of neck pain or new weakness.    Based on his physical exam, imaging review, we feel that it would be in his best interest to continue a conservative approach. Recommend follow up as needed at this time.     In the event that patient's symptoms worsen or change we would like to see him sooner. We also discussed signs of a worsening problem that he should seek being evaluated.     Will plan to call and update staff regarding cervical XR should there be any acute findings. All in agreement with plans.      Respectfully,     Sharon Morales PA-C  [facility] Neurosurgery  [address]  [place] [address]    Tel [phone]  Pager [phone]

## 2021-07-06 NOTE — NURSING NOTE
"Gigi Lackey is a 65 year old male who presents for:  Chief Complaint   Patient presents with     Chronic dens fx        Initial Vitals:  BP (!) 146/68 (BP Location: Right leg)   Pulse 86   Resp 20   Ht 5' 2\" (1.575 m)   Wt 108 lb (49 kg)   SpO2 98%   BMI 19.75 kg/m   Estimated body mass index is 19.75 kg/m  as calculated from the following:    Height as of this encounter: 5' 2\" (1.575 m).    Weight as of this encounter: 108 lb (49 kg).. Body surface area is 1.46 meters squared. BP completed using cuff size: regular  Data Unavailable                  Lyndsey Sapp RN   "

## 2021-07-09 ENCOUNTER — TELEPHONE (OUTPATIENT)
Dept: NEUROSURGERY | Facility: CLINIC | Age: 66
End: 2021-07-09

## 2021-07-09 NOTE — TELEPHONE ENCOUNTER
Called patients caregiver  Reviewed XR as hard to evaluate fracture  Gigi has been doing well with no c/o neck pain or changes in extremities   Reviewed that fx looked chronic on CT 05/29/21. Therefore, if no neurologic changes or c/o neck pain, will recommend continuing to monitor. Reviewed red flag s/s to be aware of and advised to call should these occur  Caregiver advised he has not been in pain and has been at his neurologic baseline   She will call should there be changes and agreed with plans.    CERVICAL SPINE TWO - THREE VIEWS  7/6/2021 11:32 AM      HISTORY: History of cervical fracture.     COMPARISON: Cervical spine CT 5/29/2021.                                                                       IMPRESSION: Fracture at the base of the dens is poorly evaluated on  this study. Grade 1 anterolisthesis of C5 on C6. Vertebral bodies are  only well seen from C2 through C6 on the lateral view. No obvious new  loss of vertebral body height. Marked degenerative endplate changes  and loss of disc height at C5-6 and C6-7. Apparent facet hypertrophy  throughout the cervical spine.

## 2021-10-24 ENCOUNTER — HEALTH MAINTENANCE LETTER (OUTPATIENT)
Age: 66
End: 2021-10-24

## 2022-02-13 ENCOUNTER — HEALTH MAINTENANCE LETTER (OUTPATIENT)
Age: 67
End: 2022-02-13

## 2022-10-16 ENCOUNTER — HEALTH MAINTENANCE LETTER (OUTPATIENT)
Age: 67
End: 2022-10-16

## 2023-03-26 ENCOUNTER — HEALTH MAINTENANCE LETTER (OUTPATIENT)
Age: 68
End: 2023-03-26

## 2023-08-28 NOTE — ED AVS SNAPSHOT
[facility] Emergency Department  [address]  [facility] [address]  Phone:  [phone]  Fax:  [phone]                                    Gigi Lackey   MRN: [id number]    Department:  [facility] Emergency Department   Date of Visit:  12/3/2019           After Visit Summary Signature Page    I have received my discharge instructions, and my questions have been answered. I have discussed any challenges I see with this plan with the nurse or doctor.    ..........................................................................................................................................  Patient/Patient Representative Signature      ..........................................................................................................................................  Patient Representative Print Name and Relationship to Patient    ..................................................               ................................................  Date                                   Time    ..........................................................................................................................................  Reviewed by Signature/Title    ...................................................              ..............................................  Date                                               Time          22EPIC Rev 08/18       
Unknown

## 2024-06-01 ENCOUNTER — HEALTH MAINTENANCE LETTER (OUTPATIENT)
Age: 69
End: 2024-06-01

## (undated) DEVICE — DRILL BIT QUICK COUPLING 3 FLUTE 4.2MMX330/100MM CALIBRATE

## (undated) DEVICE — Device

## (undated) DEVICE — SOL NACL 0.9% IRRIG 1000ML BOTTLE 2F7124

## (undated) DEVICE — GLOVE PROTEXIS POWDER FREE SMT 8.0  2D72PT80X

## (undated) DEVICE — LINEN HALF SHEET 5512

## (undated) DEVICE — GLOVE PROTEXIS POWDER FREE 7.5 ORTHOPEDIC 2D73ET75

## (undated) DEVICE — DRSG GAUZE 4X4" TRAY

## (undated) DEVICE — PREP CHLORAPREP 26ML TINTED HI-LITE ORANGE 930815

## (undated) DEVICE — SUCTION CANISTER STRAW 65652-008

## (undated) DEVICE — LINEN FULL SHEET 5511

## (undated) DEVICE — GLOVE PROTEXIS POWDER FREE 7.0 ORTHOPEDIC 2D73ET70

## (undated) DEVICE — DRSG ABDOMINAL 07 1/2X8" 7197D

## (undated) DEVICE — ROD SYN REAMER BALL TIP 2.5X950MM  351.706S

## (undated) DEVICE — GLOVE PROTEXIS BLUE W/NEU-THERA 7.0  2D73EB70

## (undated) DEVICE — WIRE GUIDE 3.2X400MM  357.399

## (undated) DEVICE — SUCTION CANISTER MEDIVAC LINER 3000ML W/LID 65651-530

## (undated) DEVICE — PACK HIP NAILING SOP32HPFC4

## (undated) DEVICE — ESU GROUND PAD ADULT W/CORD E7507

## (undated) DEVICE — GLOVE PROTEXIS BLUE W/NEU-THERA 8.0  2D73EB80

## (undated) DEVICE — CAST PADDING 4" UNSTERILE 9044

## (undated) DEVICE — LINEN ORTHO ACL PACK 5447

## (undated) DEVICE — SUTURE MONOCRYL+ 2-0 CT-1 36" UNDYED MCP945H

## (undated) DEVICE — SU VICRYL+ 0 CT 36" DYED VCP358H

## (undated) DEVICE — BAG CLEAR TRASH 1.3M 39X33" P4040C

## (undated) RX ORDER — PROPOFOL 10 MG/ML
INJECTION, EMULSION INTRAVENOUS
Status: DISPENSED
Start: 2021-05-30

## (undated) RX ORDER — LIDOCAINE HYDROCHLORIDE 10 MG/ML
INJECTION, SOLUTION EPIDURAL; INFILTRATION; INTRACAUDAL; PERINEURAL
Status: DISPENSED
Start: 2021-05-30

## (undated) RX ORDER — CEFAZOLIN SODIUM 2 G/100ML
INJECTION, SOLUTION INTRAVENOUS
Status: DISPENSED
Start: 2021-05-30

## (undated) RX ORDER — TRANEXAMIC ACID 10 MG/ML
INJECTION, SOLUTION INTRAVENOUS
Status: DISPENSED
Start: 2021-05-30

## (undated) RX ORDER — FENTANYL CITRATE 50 UG/ML
INJECTION, SOLUTION INTRAMUSCULAR; INTRAVENOUS
Status: DISPENSED
Start: 2021-05-30